# Patient Record
Sex: FEMALE | Race: WHITE | Employment: OTHER | URBAN - METROPOLITAN AREA
[De-identification: names, ages, dates, MRNs, and addresses within clinical notes are randomized per-mention and may not be internally consistent; named-entity substitution may affect disease eponyms.]

---

## 2017-01-04 ENCOUNTER — OFFICE VISIT (OUTPATIENT)
Dept: FAMILY MEDICINE | Facility: CLINIC | Age: 77
End: 2017-01-04
Payer: MEDICARE

## 2017-01-04 VITALS
HEART RATE: 76 BPM | BODY MASS INDEX: 27.97 KG/M2 | DIASTOLIC BLOOD PRESSURE: 70 MMHG | TEMPERATURE: 97.6 F | HEIGHT: 62 IN | SYSTOLIC BLOOD PRESSURE: 115 MMHG | WEIGHT: 152 LBS

## 2017-01-04 DIAGNOSIS — K52.9 GASTROENTERITIS: Primary | ICD-10-CM

## 2017-01-04 DIAGNOSIS — J01.90 ACUTE SINUSITIS WITH SYMPTOMS > 10 DAYS: ICD-10-CM

## 2017-01-04 PROCEDURE — 99213 OFFICE O/P EST LOW 20 MIN: CPT | Performed by: FAMILY MEDICINE

## 2017-01-04 RX ORDER — AMOXICILLIN 500 MG/1
500 CAPSULE ORAL 3 TIMES DAILY
Qty: 30 CAPSULE | Refills: 0 | Status: SHIPPED | OUTPATIENT
Start: 2017-01-04 | End: 2017-05-17

## 2017-01-04 ASSESSMENT — PAIN SCALES - GENERAL: PAINLEVEL: NO PAIN (0)

## 2017-01-04 NOTE — NURSING NOTE
"Chief Complaint   Patient presents with     Hospital F/U     Health Maintenance     Other     bpa       Initial /70 mmHg  Pulse 76  Temp(Src) 97.6  F (36.4  C) (Oral)  Ht 5' 2\" (1.575 m)  Wt 152 lb (68.947 kg)  BMI 27.79 kg/m2  Breastfeeding? No Estimated body mass index is 27.79 kg/(m^2) as calculated from the following:    Height as of this encounter: 5' 2\" (1.575 m).    Weight as of this encounter: 152 lb (68.947 kg).  BP completed using cuff size: regular taken on the left arm  Stephanie Mcfarland CMA      "

## 2017-01-04 NOTE — PROGRESS NOTES
SUBJECTIVE:                                                    Hemalatha Handy is a 76 year old female who presents to clinic today for the following health issues:       ED/UC Followup:    Facility:  [facility]  Date of visit: 12/20/2016  Reason for visit: diarrhea and vomiting  Current Status: stable            Problem list and histories reviewed & adjusted, as indicated.  Additional history: as documented             HPI      ROS  Reviewed the emergency room report in detail    Feel good now    Stomach is good    Main problem now is dry cough    Tried some mucinex     And claritin    Cough for about 1 1/2 weeks now    Some drainage down back of throat    No fever    Physical Exam   Constitutional: She is oriented to person, place, and time and well-developed, well-nourished, and in no distress. No distress.   HENT:   Head: Normocephalic and atraumatic.   Neck: Carotid bruit is not present.   Cardiovascular: Normal rate, regular rhythm, normal heart sounds and intact distal pulses.  Exam reveals no gallop and no friction rub.    No murmur heard.  Pulmonary/Chest: Effort normal and breath sounds normal.   Musculoskeletal: She exhibits no edema.   Neurological: She is alert and oriented to person, place, and time.   Skin: She is not diaphoretic.   Psychiatric: Mood and affect normal.       Throat minimally red.  No tendereness over sinuses but there is a fair amount of drainage down back of throat    ASSESSMENT / PLAN:  (K52.9) Gastroenteritis  (primary encounter diagnosis)  Comment: this has resolved  Plan: follow up prn     (J01.90) Acute sinusitis with symptoms > 10 days  Comment: cough likely due to drainage. Given duration of symptoms, reasonable to try antibiotics   Plan: amoxicillin (AMOXIL) 500 MG capsule        Follow up as needed based on symptoms       I reviewed the patient's medications, allergies, medical history, family history, and social history.    Javier Jones MD

## 2017-01-04 NOTE — PATIENT INSTRUCTIONS
Take the antibiotics     Stay well hydrated    Call if symptoms not resolving after antibiotics done    Advance diet as tolerated

## 2017-01-04 NOTE — MR AVS SNAPSHOT
"              After Visit Summary   1/4/2017    Hemalatha Handy    MRN: [id number]           Patient Information     Date Of Birth          1940        Visit Information        Provider Department      1/4/2017 8:20 AM Javier Jones MD [facility]        Today's Diagnoses     Gastroenteritis    -  1     Acute sinusitis with symptoms > 10 days           Care Instructions    Take the antibiotics     Stay well hydrated    Call if symptoms not resolving after antibiotics done    Advance diet as tolerated          Follow-ups after your visit        Who to contact     If you have questions or need follow up information about today's clinic visit or your schedule please contact [facility] directly at [phone].  Normal or non-critical lab and imaging results will be communicated to you by MyChart, letter or phone within 4 business days after the clinic has received the results. If you do not hear from us within 7 days, please contact the clinic through MyChart or phone. If you have a critical or abnormal lab result, we will notify you by phone as soon as possible.  Submit refill requests through [employer] or call your pharmacy and they will forward the refill request to us. Please allow 3 business days for your refill to be completed.          Additional Information About Your Visit        MyChart Information     [employer] lets you send messages to your doctor, view your test results, renew your prescriptions, schedule appointments and more. To sign up, go to [email or web address] . Click on \"Log in\" on the left side of the screen, which will take you to the Welcome page. Then click on \"Sign up Now\" on the right side of the page.     You will be asked to enter the access code listed below, as well as some personal information. Please follow the directions to create your username and password.     Your access code is: [email or web address]  Expires: 4/4/2017  8:54 AM     Your " "access code will  in 90 days. If you need help or a new code, please call your [facility] clinic or [phone].        Care EveryWhere ID     This is your Care EveryWhere ID. This could be used by other organizations to access your [facility] medical records  [id number]        Your Vitals Were     Pulse Temperature Height BMI (Body Mass Index) Breastfeeding?       76 97.6  F (36.4  C) (Oral) 5' 2\" (1.575 m) 27.79 kg/m2 No        Blood Pressure from Last 3 Encounters:   17 115/70   16 139/75   16 119/70    Weight from Last 3 Encounters:   17 152 lb (68.947 kg)   16 155 lb (70.308 kg)   16 157 lb (71.215 kg)              Today, you had the following     No orders found for display         Today's Medication Changes          These changes are accurate as of: 17  8:54 AM.  If you have any questions, ask your nurse or doctor.               Start taking these medicines.        Dose/Directions    amoxicillin 500 MG capsule   Commonly known as:  AMOXIL   Used for:  Acute sinusitis with symptoms > 10 days   Started by:  Javier Jones MD        Dose:  500 mg   Take 1 capsule (500 mg) by mouth 3 times daily   Quantity:  30 capsule   Refills:  0            Where to get your medicines      These medications were sent to [id number] - [facility] [address]  [address] [facility] [address]     Phone:  [phone]    - amoxicillin 500 MG capsule             Primary Care Provider Office Phone # Fax #    Javier Jones [phone]       [facility] [address]  [facility] [address]        Thank you!     Thank you for choosing [facility]  for your care. Our goal is always to provide you with excellent care. Hearing back from our patients is one way we can continue to improve our services. Please take a few minutes to complete the written survey that you may receive in the mail " after your visit with us. Thank you!             Your Updated Medication List - Protect others around you: Learn how to safely use, store and throw away your medicines at www.disposemymeds.org.          This list is accurate as of: 1/4/17  8:54 AM.  Always use your most recent med list.                   Brand Name Dispense Instructions for use    ALPRAZolam 0.25 MG tablet    XANAX    30 tablet    Take 1 tablet (0.25 mg) by mouth 3 times daily as needed for anxiety       amoxicillin 500 MG capsule    AMOXIL    30 capsule    Take 1 capsule (500 mg) by mouth 3 times daily       citalopram 20 MG tablet    celeXA    90 tablet    Take 1 tablet (20 mg) by mouth daily       lisinopril 10 MG tablet    PRINIVIL/ZESTRIL    90 tablet    Take 1 tablet (10 mg) by mouth daily       MOTRIN 800 MG tablet   Generic drug:  ibuprofen      1 TABLET 3 TIMES DAILY AS NEEDED       ranitidine 150 MG tablet    ZANTAC    180 tablet    Take 1 tablet (150 mg) by mouth 2 times daily

## 2017-01-16 ENCOUNTER — TELEPHONE (OUTPATIENT)
Dept: FAMILY MEDICINE | Facility: CLINIC | Age: 77
End: 2017-01-16

## 2017-01-16 DIAGNOSIS — F41.9 ANXIETY: Primary | ICD-10-CM

## 2017-01-16 RX ORDER — ALPRAZOLAM 0.25 MG
0.25 TABLET ORAL 3 TIMES DAILY PRN
Qty: 30 TABLET | Refills: 0 | Status: SHIPPED | OUTPATIENT
Start: 2017-01-16 | End: 2017-02-22

## 2017-01-16 NOTE — TELEPHONE ENCOUNTER
ALPRAZolam (XANAX) 0.25 MG tablet      Last Written Prescription Date:  12/7/16  Last Fill Quantity: 30,   # refills: 0  Last Office Visit with [facility], [facility] or [facility] prescribing provider: 1/4/17  Future Office visit:       Routing refill request to provider for review/approval because:  Drug not on the [facility], [facility] or [facility] refill protocol or controlled substance

## 2017-02-03 DIAGNOSIS — I10 HYPERTENSION GOAL BP (BLOOD PRESSURE) < 140/90: Primary | ICD-10-CM

## 2017-02-03 DIAGNOSIS — F41.9 ANXIETY: Primary | ICD-10-CM

## 2017-02-03 NOTE — TELEPHONE ENCOUNTER
lisinopril (PRINIVIL,ZESTRIL) 10 MG tablet      Last Written Prescription Date: 4-18-16  Last Fill Quantity: 90, # refills: 2  Last Office Visit with [facility], [facility] or [facility] prescribing provider: 1-4-17       POTASSIUM   Date Value Ref Range Status   01/21/2016 3.9 3.4 - 5.3 mmol/L Final     CREATININE   Date Value Ref Range Status   01/21/2016 0.65 0.52 - 1.04 mg/dL Final     BP Readings from Last 3 Encounters:   01/04/17 115/70   08/01/16 139/75   01/21/16 119/70

## 2017-02-03 NOTE — TELEPHONE ENCOUNTER
citalopram (CELEXA) 20 MG tablet     Last Written Prescription Date: 7/25/16  Last Fill Quantity: 90, # refills: 1  Last Office Visit with [facility] primary care provider:  1/4/17        Last PHQ-9 score on record= No flowsheet data found.

## 2017-02-06 RX ORDER — LISINOPRIL 10 MG/1
10 TABLET ORAL DAILY
Qty: 90 TABLET | Refills: 1 | Status: SHIPPED | OUTPATIENT
Start: 2017-02-06 | End: 2017-08-12

## 2017-02-06 RX ORDER — CITALOPRAM HYDROBROMIDE 20 MG/1
20 TABLET ORAL DAILY
Qty: 90 TABLET | Refills: 1 | Status: SHIPPED | OUTPATIENT
Start: 2017-02-06 | End: 2017-08-12

## 2017-02-06 NOTE — TELEPHONE ENCOUNTER
Routing refill request to provider for review/approval because:  Due to be seen for anxiety  Lynne Lopez RN [facility] Triage.

## 2017-02-06 NOTE — TELEPHONE ENCOUNTER
Routing refill request to provider for review/approval because:  Labs not current  Lynne Lopez RN [facility] Triage.

## 2017-02-22 DIAGNOSIS — F41.9 ANXIETY: ICD-10-CM

## 2017-02-22 NOTE — TELEPHONE ENCOUNTER
ALPRAZolam (XANAX) 0.25 MG tablet      Last Written Prescription Date:  1/16/17  Last Fill Quantity: 30,    refills:0  Last Office Visit with [facility], [facility] or [facility] prescribing provider: 1/4/17  Future Office visit:       Routing refill request to provider for review/approval because:  Drug not on the [facility], [facility] or [facility] refill protocol or controlled substance

## 2017-02-24 RX ORDER — ALPRAZOLAM 0.25 MG
0.25 TABLET ORAL 3 TIMES DAILY PRN
Qty: 30 TABLET | Refills: 0 | Status: SHIPPED | OUTPATIENT
Start: 2017-02-24 | End: 2017-04-02

## 2017-04-02 DIAGNOSIS — F41.9 ANXIETY: ICD-10-CM

## 2017-04-03 NOTE — TELEPHONE ENCOUNTER
ALPRAZolam (XANAX) 0.25 MG tablet      Last Written Prescription Date:  2-24-17  Last Fill Quantity: 30,   # refills: 0  Last Office Visit with [facility], [facility] or [facility] prescribing provider: 1-4-17  Future Office visit:       Routing refill request to provider for review/approval because:  Drug not on the [facility], [facility] or [facility] refill protocol or controlled substance

## 2017-04-04 RX ORDER — ALPRAZOLAM 0.25 MG
TABLET ORAL
Qty: 30 TABLET | Refills: 0 | Status: SHIPPED | OUTPATIENT
Start: 2017-04-04 | End: 2017-05-08

## 2017-04-15 ENCOUNTER — OFFICE VISIT (OUTPATIENT)
Dept: URGENT CARE | Facility: URGENT CARE | Age: 77
End: 2017-04-15
Payer: MEDICARE

## 2017-04-15 VITALS
WEIGHT: 154 LBS | BODY MASS INDEX: 28.17 KG/M2 | SYSTOLIC BLOOD PRESSURE: 128 MMHG | DIASTOLIC BLOOD PRESSURE: 76 MMHG | TEMPERATURE: 97.4 F | HEART RATE: 75 BPM

## 2017-04-15 DIAGNOSIS — R30.0 DYSURIA: ICD-10-CM

## 2017-04-15 DIAGNOSIS — M54.50 ACUTE BILATERAL LOW BACK PAIN WITHOUT SCIATICA: Primary | ICD-10-CM

## 2017-04-15 DIAGNOSIS — R31.29 MICROSCOPIC HEMATURIA: ICD-10-CM

## 2017-04-15 LAB
ALBUMIN UR-MCNC: NEGATIVE MG/DL
APPEARANCE UR: CLEAR
BILIRUB UR QL STRIP: NEGATIVE
COLOR UR AUTO: YELLOW
GLUCOSE UR STRIP-MCNC: NEGATIVE MG/DL
HGB UR QL STRIP: ABNORMAL
KETONES UR STRIP-MCNC: NEGATIVE MG/DL
LEUKOCYTE ESTERASE UR QL STRIP: NEGATIVE
NITRATE UR QL: NEGATIVE
NON-SQ EPI CELLS #/AREA URNS LPF: ABNORMAL /LPF
PH UR STRIP: 5.5 PH (ref 5–7)
RBC #/AREA URNS AUTO: ABNORMAL /HPF (ref 0–2)
SP GR UR STRIP: 1.02 (ref 1–1.03)
URN SPEC COLLECT METH UR: ABNORMAL
UROBILINOGEN UR STRIP-ACNC: 0.2 EU/DL (ref 0.2–1)
WBC #/AREA URNS AUTO: ABNORMAL /HPF (ref 0–2)

## 2017-04-15 PROCEDURE — 87086 URINE CULTURE/COLONY COUNT: CPT | Performed by: FAMILY MEDICINE

## 2017-04-15 PROCEDURE — 81001 URINALYSIS AUTO W/SCOPE: CPT | Performed by: FAMILY MEDICINE

## 2017-04-15 PROCEDURE — 99214 OFFICE O/P EST MOD 30 MIN: CPT | Performed by: FAMILY MEDICINE

## 2017-04-15 RX ORDER — NITROFURANTOIN 25; 75 MG/1; MG/1
100 CAPSULE ORAL 2 TIMES DAILY
Qty: 14 CAPSULE | Refills: 0 | Status: SHIPPED | OUTPATIENT
Start: 2017-04-15 | End: 2017-05-17

## 2017-04-15 NOTE — MR AVS SNAPSHOT
"              After Visit Summary   4/15/2017    Hemalatha Handy    MRN: [id number]           Patient Information     Date Of Birth          1940        Visit Information        Provider Department      4/15/2017 12:40 PM Nicol Ferguson MD [facility]        Today's Diagnoses     Acute bilateral low back pain without sciatica    -  1    Dysuria        Microscopic hematuria           Follow-ups after your visit        Future tests that were ordered for you today     Open Future Orders        Priority Expected Expires Ordered    UA with Microscopic reflex to Culture Routine 5/27/2017 4/15/2018 4/15/2017            Who to contact     If you have questions or need follow up information about today's clinic visit or your schedule please contact [facility] directly at [phone].  Normal or non-critical lab and imaging results will be communicated to you by MyChart, letter or phone within 4 business days after the clinic has received the results. If you do not hear from us within 7 days, please contact the clinic through MyChart or phone. If you have a critical or abnormal lab result, we will notify you by phone as soon as possible.  Submit refill requests through [employer] or call your pharmacy and they will forward the refill request to us. Please allow 3 business days for your refill to be completed.          Additional Information About Your Visit        [employer] To File Information     [employer] lets you send messages to your doctor, view your test results, renew your prescriptions, schedule appointments and more. To sign up, go to [email or web address] . Click on \"Log in\" on the left side of the screen, which will take you to the Welcome page. Then click on \"Sign up Now\" on the right side of the page.     You will be asked to enter the access code listed below, as well as some personal information. Please follow the directions to create your username and password.     Your access " code is: [email or web address]  Expires: 2017 10:21 PM     Your access code will  in 90 days. If you need help or a new code, please call your [facility] clinic or [phone].        Care EveryWhere ID     This is your Care EveryWhere ID. This could be used by other organizations to access your [facility] medical records  [id number]        Your Vitals Were     Pulse Temperature BMI (Body Mass Index)             75 97.4  F (36.3  C) (Tympanic) 28.17 kg/m2          Blood Pressure from Last 3 Encounters:   04/15/17 128/76   17 115/70   16 139/75    Weight from Last 3 Encounters:   04/15/17 154 lb (69.9 kg)   17 152 lb (68.9 kg)   16 155 lb (70.3 kg)              We Performed the Following     *UA reflex to Microscopic and Culture ([place] [facility] (except [place] and [place])     Urine Culture Aerobic Bacterial     Urine Microscopic          Today's Medication Changes          These changes are accurate as of: 4/15/17 10:22 PM.  If you have any questions, ask your nurse or doctor.               Start taking these medicines.        Dose/Directions    nitrofurantoin (macrocrystal-monohydrate) 100 MG capsule   Commonly known as:  MACROBID   Used for:  Microscopic hematuria, Dysuria        Dose:  100 mg   Take 1 capsule (100 mg) by mouth 2 times daily   Quantity:  14 capsule   Refills:  0            Where to get your medicines      These medications were sent to [id number] [address]  [address] [facility] [address]     Phone:  [phone]     nitrofurantoin (macrocrystal-monohydrate) 100 MG capsule                Primary Care Provider Office Phone # Fax #    Javier Jones [phone]       [address] [address]        Thank you!     [facility] you [facility]  [facility]. Our goal is always to provide you with excellent care. Hearing back  from our patients is one way we can continue to improve our services. Please take a few minutes to complete the written survey that you may receive in the mail after your visit with us. Thank you!             Your Updated Medication List - Protect others around you: Learn how to safely use, store and throw away your medicines at www.disposemymeds.org.          This list is accurate as of: 4/15/17 10:22 PM.  Always use your most recent med list.                   Brand Name Dispense Instructions for use    ALPRAZolam 0.25 MG tablet    XANAX    30 tablet    TAKE ONE TABLET BY MOUTH THREE TIMES DAILY AS NEEDED FOR ANXIETY       amoxicillin 500 MG capsule    AMOXIL    30 capsule    Take 1 capsule (500 mg) by mouth 3 times daily       citalopram 20 MG tablet    celeXA    90 tablet    Take 1 tablet (20 mg) by mouth daily       lisinopril 10 MG tablet    PRINIVIL/ZESTRIL    90 tablet    Take 1 tablet (10 mg) by mouth daily       MOTRIN 800 MG tablet   Generic drug:  ibuprofen      1 TABLET 3 TIMES DAILY AS NEEDED       nitrofurantoin (macrocrystal-monohydrate) 100 MG capsule    MACROBID    14 capsule    Take 1 capsule (100 mg) by mouth 2 times daily       ranitidine 150 MG tablet    ZANTAC    180 tablet    Take 1 tablet (150 mg) by mouth 2 times daily

## 2017-04-15 NOTE — LETTER
April 25, 2018      Hemalatha Handy  [address]  [place] [address]        Dear Hemalatha,    At your last office visit with me - your urine showed some trace amount of blood.  I would recommend repeat urinalysis to make sure that this is resolved and not persistent.   Please call our clinic to schedule a lab appointment [phone]        Sincerely,    Nicol Ferguson

## 2017-04-15 NOTE — PROGRESS NOTES
SUBJECTIVE:                                                    Hemalatha Handy is a 76 year old female who presents to clinic today for the following health issues:      URINARY TRACT SYMPTOMS      Duration: X 3 days    Description  frequency, urgency and back pain, burning    Intensity:  moderate    Accompanying signs and symptoms:  Fever/chills: no   Flank pain YES - both sides lumbar area  Worse with walking. Rubbing seems to make it better  Unsure if possible muscle strain.   No loss of control of bowel or bladder, no numbness or weakness down the legs  Nausea and vomiting: YES- nausea  Vaginal symptoms: none  Abdominal/Pelvic Pain: no     History  History of frequent UTI's: YES- X 1  History of kidney stones: no   Sexually Active: no   Possibility of pregnancy: No    Precipitating or alleviating factors: None    Therapies tried and outcome: increased fluids     Had a history of uti a couple of years ago.     Past 3 days just noticed a back pain  Occasional burning with urination  No gross hematuria      Problem list and histories reviewed & adjusted, as indicated.  Additional history: as documented    Problem list, Medication list, Allergies, and Medical/Social/Surgical histories reviewed in EPIC and updated as appropriate.    ROS:  Constitutional, HEENT, cardiovascular, pulmonary, gi and gu systems are negative, except as otherwise noted.    OBJECTIVE:                                                    /76  Pulse 75  Temp 97.4  F (36.3  C) (Tympanic)  Wt 154 lb (69.9 kg)  BMI 28.17 kg/m2  Body mass index is 28.17 kg/(m^2).  GENERAL: healthy, alert and no distress  NECK: no adenopathy, no asymmetry, masses, or scars and thyroid normal to palpation  RESP: lungs clear to auscultation - no rales, rhonchi or wheezes  CV: regular rate and rhythm, normal S1 S2, no S3 or S4, no murmur, click or rub, no peripheral edema and peripheral pulses strong  ABDOMEN: soft, nontender, no hepatosplenomegaly, no  masses and bowel sounds normal  MS: no gross musculoskeletal defects noted, no edema  No spine tenderness positive bilateral lumbar mild muscle spasm, No abnormal straight leg raise testing MMT5/5, sensory intact, normal reflexes    Diagnostic Test Results:  Results for orders placed or performed in visit on 04/15/17 (from the past 24 hour(s))   *UA reflex to Microscopic and Culture ([facility] (except [place] and [place])   Result Value Ref Range    Color Urine Yellow     Appearance Urine Clear     Glucose Urine Negative NEG mg/dL    Bilirubin Urine Negative NEG    Ketones Urine Negative NEG mg/dL    Specific Gravity Urine 1.025 1.003 - 1.035    Blood Urine Small (A) NEG    pH Urine 5.5 5.0 - 7.0 pH    Protein Albumin Urine Negative NEG mg/dL    Urobilinogen Urine 0.2 0.2 - 1.0 EU/dL    Nitrite Urine Negative NEG    Leukocyte Esterase Urine Negative NEG    Source Midstream Urine    Urine Microscopic   Result Value Ref Range    WBC Urine O - 2 0 - 2 /HPF    RBC Urine 2-5 (A) 0 - 2 /HPF    Squamous Epithelial /LPF Urine Few FEW /LPF        ASSESSMENT/PLAN:                                                        ICD-10-CM    1. Acute bilateral low back pain without sciatica M54.5 *UA reflex to Microscopic and Culture ([facility] (except [place] and [place])     Urine Microscopic   2. Dysuria R30.0 Urine Culture Aerobic Bacterial     nitrofurantoin, macrocrystal-monohydrate, (MACROBID) 100 MG capsule   3. Microscopic hematuria R31.29 Urine Culture Aerobic Bacterial     nitrofurantoin, macrocrystal-monohydrate, (MACROBID) 100 MG capsule       Aware to come back in if with any worsening symptoms, bowel or bladder incontinence, motor or senosry deficits.   Some microscopic hematuria and dysuria. Per patient this is how she gets with a uti  Send for urine culture  Prescribed with macrobid  If culture negative refer urology  If positive, hematuria likely from infection  Patient with  hematuria advised a repeat urinalysis in 6 weeks is needed to make sure hematuria is resolved. If persistent aware will need further evaluation to rule out possibility of stones or tumors. Patient will make lab appointment  Aware to go to ER or come in immediately if with any fever chills nausea vomiting or unilateral or worsening flank pain.  Adverse reactions of medications discussed.  Over the counter medications discussed.   Aware to come back in if with worsening symptoms or if no relief despite treatment plan  Patient voiced understanding and had no further questions.     MD Nicol Mott MD  [facility]

## 2017-04-15 NOTE — NURSING NOTE
"Chief Complaint   Patient presents with     Back Pain     burning urination, frequency       Initial /76  Pulse 75  Temp 97.4  F (36.3  C) (Tympanic)  Wt 154 lb (69.9 kg)  BMI 28.17 kg/m2 Estimated body mass index is 28.17 kg/(m^2) as calculated from the following:    Height as of 1/4/17: 5' 2\" (1.575 m).    Weight as of this encounter: 154 lb (69.9 kg).  Medication Reconciliation: complete   Nani Ye CMA      "

## 2017-04-16 LAB
BACTERIA SPEC CULT: NORMAL
MICRO REPORT STATUS: NORMAL
SPECIMEN SOURCE: NORMAL

## 2017-05-08 ENCOUNTER — TELEPHONE (OUTPATIENT)
Dept: PEDIATRICS | Facility: CLINIC | Age: 77
End: 2017-05-08

## 2017-05-08 DIAGNOSIS — F41.9 ANXIETY: ICD-10-CM

## 2017-05-08 RX ORDER — ALPRAZOLAM 0.25 MG
TABLET ORAL
Qty: 30 TABLET | Refills: 0 | Status: SHIPPED | OUTPATIENT
Start: 2017-05-08 | End: 2017-06-15

## 2017-05-08 NOTE — TELEPHONE ENCOUNTER
ALPRAZolam (XANAX) 0.25 MG tablet      Last Written Prescription Date:  4-4-17  Last Fill Quantity: 30,   # refills: 0  Last Office Visit with [facility], [facility] or [facility] prescribing provider: 1-4-17  Future Office visit:       Routing refill request to provider for review/approval because:  Drug not on the [facility], [facility] or [facility] refill protocol or controlled substance

## 2017-05-17 ENCOUNTER — OFFICE VISIT (OUTPATIENT)
Dept: PEDIATRICS | Facility: CLINIC | Age: 77
End: 2017-05-17
Payer: MEDICARE

## 2017-05-17 VITALS
TEMPERATURE: 97.8 F | BODY MASS INDEX: 27.44 KG/M2 | OXYGEN SATURATION: 97 % | DIASTOLIC BLOOD PRESSURE: 67 MMHG | HEART RATE: 88 BPM | WEIGHT: 150 LBS | SYSTOLIC BLOOD PRESSURE: 122 MMHG

## 2017-05-17 DIAGNOSIS — S83.512S RUPTURE OF ANTERIOR CRUCIATE LIGAMENT OF LEFT KNEE, SEQUELA: ICD-10-CM

## 2017-05-17 DIAGNOSIS — M70.42 PREPATELLAR BURSITIS, LEFT KNEE: Primary | ICD-10-CM

## 2017-05-17 PROCEDURE — 99214 OFFICE O/P EST MOD 30 MIN: CPT | Performed by: INTERNAL MEDICINE

## 2017-05-17 RX ORDER — METHYLPREDNISOLONE 4 MG
TABLET, DOSE PACK ORAL
Qty: 21 TABLET | Refills: 0 | Status: SHIPPED | OUTPATIENT
Start: 2017-05-17 | End: 2017-08-23

## 2017-05-17 ASSESSMENT — PAIN SCALES - GENERAL: PAINLEVEL: MILD PAIN (3)

## 2017-05-17 NOTE — PROGRESS NOTES
SUBJECTIVE:                                                    Hemalatha Handy is a 77 year old female who presents to clinic today for the following health issues:      Joint Pain     Onset: 5 days    Description:   Location: left knee  Character: Sharp, Dull ache and Stabbing    Intensity: moderate    Progression of Symptoms: same, constant    Accompanying Signs & Symptoms:  Other symptoms: radiation of pain to upper leg, swelling and stiffness    History:   Previous similar pain: YES- old injury to knee      Precipitating factors:   Trauma or overuse: YES- old injury to knee    Alleviating factors:  Improved by: nothing       Therapies Tried and outcome: Resting          Problem list and histories reviewed & adjusted, as indicated.  Additional history: as documented    Patient Active Problem List   Diagnosis     CARDIOVASCULAR SCREENING; LDL GOAL LESS THAN 130     Anxiety     Advanced directives, counseling/discussion     GERD (gastroesophageal reflux disease)     Hyperlipidemia with target LDL less than 130     Lateral meniscus tear, left, initial encounter     Popliteal cyst, left     Gastroesophageal reflux disease, esophagitis presence not specified     Essential hypertension with goal blood pressure less than 140/90     Past Surgical History:   Procedure Laterality Date     COLONOSCOPY  9-18-08     ENT SURGERY  7-12-12    Left lower lip lesion     HYSTERECTOMY, PAP NO LONGER INDICATED         Social History   Substance Use Topics     Smoking status: Former Smoker     Quit date: 1/1/2005     Smokeless tobacco: Never Used     Alcohol use Yes     Family History   Problem Relation Age of Onset     Hypertension Brother      Hypertension Mother      CANCER Son      hodgkins     C.A.D. No family hx of      CEREBROVASCULAR DISEASE No family hx of          Current Outpatient Prescriptions   Medication Sig Dispense Refill     methylPREDNISolone (MEDROL DOSEPAK) 4 MG tablet Follow package instructions 21 tablet 0      ALPRAZolam (XANAX) 0.25 MG tablet TAKE ONE TABLET BY MOUTH THREE TIMES DAILY AS NEEDED FOR ANXIETY 30 tablet 0     lisinopril (PRINIVIL/ZESTRIL) 10 MG tablet Take 1 tablet (10 mg) by mouth daily 90 tablet 1     citalopram (CELEXA) 20 MG tablet Take 1 tablet (20 mg) by mouth daily 90 tablet 1     ranitidine (ZANTAC) 150 MG tablet Take 1 tablet (150 mg) by mouth 2 times daily 180 tablet 3     MOTRIN 800 MG OR TABS 1 TABLET 3 TIMES DAILY AS NEEDED       Allergies   Allergen Reactions     Alcohol Hives     Asa [Aspirin]      Evista [Raloxifene Hydrochloride]      Prempro      Raloxifene Hives and Rash     Recent Labs   Lab Test  01/21/16   1012  06/05/15   1017  03/23/15   1456  05/14/14   1522   03/02/12   1101   A1C  5.7   --    --   5.9   --   5.3   LDL  124*  130*   --   107   < >  106   HDL  53  56   --   47*   < >  42*   TRIG  121  144   --   84   < >  126   ALT  21   --   31  26   < >  23   CR  0.65   --   0.69  0.71   < >  0.66   GFRESTIMATED  89   --   83  80   < >  88   GFRESTBLACK  >90   GFR Calc     --   >90   GFR Calc    >90   < >  >90   POTASSIUM  3.9   --   4.0  4.3   < >  4.5   TSH  1.53   --   0.94  0.43   < >  1.02    < > = values in this interval not displayed.        Reviewed and updated as needed this visit by clinical staff  Tobacco  Allergies  Meds  Med Hx  Surg Hx  Fam Hx  Soc Hx      Reviewed and updated as needed this visit by Provider         ROS:  Constitutional, HEENT, cardiovascular, pulmonary, gi and gu systems are negative, except as otherwise noted.    OBJECTIVE:                                                    /67 (BP Location: Left arm, Patient Position: Chair, Cuff Size: Adult Regular)  Pulse 88  Temp 97.8  F (36.6  C) (Oral)  Wt 150 lb (68 kg)  SpO2 97%  BMI 27.44 kg/m2  Body mass index is 27.44 kg/(m^2).  GENERAL: healthy, alert and no distress  NECK: no adenopathy, no asymmetry, masses, or scars and thyroid normal to  palpation  RESP: lungs clear to auscultation - no rales, rhonchi or wheezes  CV: regular rate and rhythm, normal S1 S2, no S3 or S4, no murmur, click or rub, no peripheral edema and peripheral pulses strong  ABDOMEN: soft, nontender, no hepatosplenomegaly, no masses and bowel sounds normal  MS: no gross musculoskeletal defects noted, no edema  Loose and clunky on anterior drawer sign  Mc genao's negative bilaterally      Diagnostic Test Results:  Results for orders placed or performed in visit on 04/15/17   *UA reflex to Microscopic and Culture ([place] [facility] (except [place] and [place])   Result Value Ref Range    Color Urine Yellow     Appearance Urine Clear     Glucose Urine Negative NEG mg/dL    Bilirubin Urine Negative NEG    Ketones Urine Negative NEG mg/dL    Specific Gravity Urine 1.025 1.003 - 1.035    Blood Urine Small (A) NEG    pH Urine 5.5 5.0 - 7.0 pH    Protein Albumin Urine Negative NEG mg/dL    Urobilinogen Urine 0.2 0.2 - 1.0 EU/dL    Nitrite Urine Negative NEG    Leukocyte Esterase Urine Negative NEG    Source Midstream Urine    Urine Microscopic   Result Value Ref Range    WBC Urine O - 2 0 - 2 /HPF    RBC Urine 2-5 (A) 0 - 2 /HPF    Squamous Epithelial /LPF Urine Few FEW /LPF   Urine Culture Aerobic Bacterial   Result Value Ref Range    Specimen Description Midstream Urine     Culture Micro       <10,000 colonies/mL mixed urogenital patti Susceptibility testing not routinely   done      Micro Report Status FINAL 04/16/2017         ASSESSMENT/PLAN:                                                        ICD-10-CM    1. Prepatellar bursitis, left knee M70.42 methylPREDNISolone (MEDROL DOSEPAK) 4 MG tablet   2. Rupture of anterior cruciate ligament of left knee, sequela S83.512S methylPREDNISolone (MEDROL DOSEPAK) 4 MG tablet     MRI reviewed with substantial chronic change including likely complete ACL tear oand lateral complex meniscus    If not improved, will need  ortho.        Brian Henriquez MD  [facility]

## 2017-05-17 NOTE — MR AVS SNAPSHOT
"              After Visit Summary   2017    Hemalatha Handy    MRN: [id number]           Patient Information     Date Of Birth          1940        Visit Information        Provider Department      2017 1:40 PM Brian Henriquez MD [facility]        Today's Diagnoses     Prepatellar bursitis, left knee    -  1    Rupture of anterior cruciate ligament of left knee, sequela           Follow-ups after your visit        Who to contact     If you have questions or need follow up information about today's clinic visit or your schedule please contact [facility] directly at [phone].  Normal or non-critical lab and imaging results will be communicated to you by MyChart, letter or phone within 4 business days after the clinic has received the results. If you do not hear from us within 7 days, please contact the clinic through BuzzSumohart or phone. If you have a critical or abnormal lab result, we will notify you by phone as soon as possible.  Submit refill requests through [email or web address] or call your pharmacy and they will forward the refill request to us. Please allow 3 business days for your refill to be completed.          Additional Information About Your Visit        MyChart Information     [email or web address] lets you send messages to your doctor, view your test results, renew your prescriptions, schedule appointments and more. To sign up, go to [email or web address] . Click on \"Log in\" on the left side of the screen, which will take you to the Welcome page. Then click on \"Sign up Now\" on the right side of the page.     You will be asked to enter the access code listed below, as well as some personal information. Please follow the directions to create your username and password.     Your access code is: [email or web address]  Expires: 2017 10:21 PM     Your access code will  in 90 days. If you need help or a new code, please call your [facility] or [phone].      "   Care EveryWhere ID     This is your Care EveryWhere ID. This could be used by other organizations to access your [place] medical records  [id number]        Your Vitals Were     Pulse Temperature Pulse Oximetry BMI (Body Mass Index)          88 97.8  F (36.6  C) (Oral) 97% 27.44 kg/m2         Blood Pressure from Last 3 Encounters:   05/17/17 122/67   04/15/17 128/76   01/04/17 115/70    Weight from Last 3 Encounters:   05/17/17 150 lb (68 kg)   04/15/17 154 lb (69.9 kg)   01/04/17 152 lb (68.9 kg)              Today, you had the following     No orders found for display         Today's Medication Changes          These changes are accurate as of: 5/17/17  1:53 PM.  If you have any questions, ask your nurse or doctor.               Start taking these medicines.        Dose/Directions    methylPREDNISolone 4 MG tablet   Commonly known as:  MEDROL DOSEPAK   Used for:  Prepatellar bursitis, left knee, Rupture of anterior cruciate ligament of left knee, sequela   Started by:  Brian Henriquez MD        Follow package instructions   Quantity:  21 tablet   Refills:  0            Where to get your medicines      These medications were sent to [id number] - [facility] [address]  [address] [facility] [facility] [address]     Phone:  [phone]     methylPREDNISolone 4 MG tablet                Primary Care Provider Office Phone # Fax #    Javier Jones [phone]       [facility] [place] [address] [facility] [address]        Thank you!     Thank you for choosing [facility]  for your care. Our goal is always to provide you with excellent care. Hearing back from our patients is one way we can continue to improve our services. Please take a few minutes to complete the written survey that you may receive in the mail after your visit with us. Thank you!             Your Updated Medication List - Protect others around you: Learn how  to safely use, store and throw away your medicines at www.disposemymeds.org.          This list is accurate as of: 5/17/17  1:53 PM.  Always use your most recent med list.                   Brand Name Dispense Instructions for use    ALPRAZolam 0.25 MG tablet    XANAX    30 tablet    TAKE ONE TABLET BY MOUTH THREE TIMES DAILY AS NEEDED FOR ANXIETY       citalopram 20 MG tablet    celeXA    90 tablet    Take 1 tablet (20 mg) by mouth daily       lisinopril 10 MG tablet    PRINIVIL/ZESTRIL    90 tablet    Take 1 tablet (10 mg) by mouth daily       methylPREDNISolone 4 MG tablet    MEDROL DOSEPAK    21 tablet    Follow package instructions       MOTRIN 800 MG tablet   Generic drug:  ibuprofen      1 TABLET 3 TIMES DAILY AS NEEDED       ranitidine 150 MG tablet    ZANTAC    180 tablet    Take 1 tablet (150 mg) by mouth 2 times daily

## 2017-05-17 NOTE — NURSING NOTE
"Chief Complaint   Patient presents with     Knee Pain       Initial /67 (BP Location: Left arm, Patient Position: Chair, Cuff Size: Adult Regular)  Pulse 88  Temp 97.8  F (36.6  C) (Oral)  Wt 150 lb (68 kg)  SpO2 97%  BMI 27.44 kg/m2 Estimated body mass index is 27.44 kg/(m^2) as calculated from the following:    Height as of 1/4/17: 5' 2\" (1.575 m).    Weight as of this encounter: 150 lb (68 kg).  Medication Reconciliation: complete   Coleen Amanda MA      "

## 2017-06-15 DIAGNOSIS — F41.9 ANXIETY: ICD-10-CM

## 2017-06-16 RX ORDER — ALPRAZOLAM 0.25 MG
TABLET ORAL
Qty: 30 TABLET | Refills: 0 | Status: SHIPPED | OUTPATIENT
Start: 2017-06-16 | End: 2017-07-31

## 2017-06-16 NOTE — TELEPHONE ENCOUNTER
ALPRAZolam (XANAX) 0.25 MG tablet      Last Written Prescription Date:  5/8/17  Last Fill Quantity: 30,   # refills: 0  Last Office Visit with [facility], [facility] or [facility] prescribing provider: 5/17/17  Future Office visit:       Routing refill request to provider for review/approval because:  Drug not on the [facility], [facility] or [facility] refill protocol or controlled substance

## 2017-07-31 DIAGNOSIS — F41.9 ANXIETY: ICD-10-CM

## 2017-07-31 RX ORDER — ALPRAZOLAM 0.25 MG
TABLET ORAL
Qty: 30 TABLET | Refills: 0 | Status: SHIPPED | OUTPATIENT
Start: 2017-07-31 | End: 2017-08-23

## 2017-07-31 NOTE — TELEPHONE ENCOUNTER
Routing refill request to provider for review/approval because:  Drug not on the [facility] refill protocol     Mellissa Abad RN  [facility]

## 2017-07-31 NOTE — TELEPHONE ENCOUNTER
ALPRAZolam (XANAX) 0.25 MG tablet      Last Written Prescription Date:  6/16/17  Last Fill Quantity: 30,   # refills: 0  Last Office Visit with [facility], [facility] or [facility] prescribing provider: 5/17/17  Future Office visit:       Routing refill request to provider for review/approval because:  Drug not on the [facility], [facility] or [facility] refill protocol or controlled substance

## 2017-08-12 ENCOUNTER — TELEPHONE (OUTPATIENT)
Dept: FAMILY MEDICINE | Facility: CLINIC | Age: 77
End: 2017-08-12

## 2017-08-12 DIAGNOSIS — K21.9 GASTROESOPHAGEAL REFLUX DISEASE WITHOUT ESOPHAGITIS: ICD-10-CM

## 2017-08-12 DIAGNOSIS — F41.9 ANXIETY: ICD-10-CM

## 2017-08-12 DIAGNOSIS — I10 HYPERTENSION GOAL BP (BLOOD PRESSURE) < 140/90: ICD-10-CM

## 2017-08-14 RX ORDER — LISINOPRIL 10 MG/1
TABLET ORAL
Qty: 30 TABLET | Refills: 1 | Status: SHIPPED | OUTPATIENT
Start: 2017-08-14 | End: 2017-08-23

## 2017-08-14 RX ORDER — CITALOPRAM HYDROBROMIDE 20 MG/1
TABLET ORAL
Qty: 30 TABLET | Refills: 8 | Status: SHIPPED | OUTPATIENT
Start: 2017-08-14 | End: 2017-08-23

## 2017-08-14 NOTE — TELEPHONE ENCOUNTER
Okayed a month with a refill but advise clinic appointment in the next month or so.      Please inform patient    Javier Jones MD

## 2017-08-14 NOTE — TELEPHONE ENCOUNTER
Citalopram used for anxiety, PHQ9 not required.    Citalopram and zantac Prescription approved per [facility] Refill Protocol.    Lisinopril Routing refill request to provider for review/approval because:  Labs not current:  Potassium and creatinine, unsure of plan (office visit or lab only)    Mellissa Abad RN  [facility]

## 2017-08-14 NOTE — TELEPHONE ENCOUNTER
citalopram (CELEXA) 20 MG tablet     Last Written Prescription Date: 2/6/17  Last Fill Quantity: 90, # refills: 1  Last Office Visit with [facility] primary care provider:  5/17/17        Last PHQ-9 score on record= No flowsheet data found.      ranitidine (ZANTAC) 150 MG tablet      Last Written Prescription Date: 6/27/16  Last Fill Quantity: 180,  # refills: 3   Last Office Visit with [facility], [facility] or [facility] prescribing provider: 5/17/17                                                 lisinopril (PRINIVIL/ZESTRIL) 10 MG tablet      Last Written Prescription Date: 6/27/16  Last Fill Quantity: 180, # refills: 3  Last Office Visit with [facility], [facility] or [facility] prescribing provider: 5/17/17       Potassium   Date Value Ref Range Status   01/21/2016 3.9 3.4 - 5.3 mmol/L Final     Creatinine   Date Value Ref Range Status   01/21/2016 0.65 0.52 - 1.04 mg/dL Final     BP Readings from Last 3 Encounters:   05/17/17 122/67   04/15/17 128/76   01/04/17 115/70

## 2017-08-23 ENCOUNTER — OFFICE VISIT (OUTPATIENT)
Dept: FAMILY MEDICINE | Facility: CLINIC | Age: 77
End: 2017-08-23
Payer: COMMERCIAL

## 2017-08-23 VITALS
WEIGHT: 156.38 LBS | OXYGEN SATURATION: 96 % | DIASTOLIC BLOOD PRESSURE: 71 MMHG | HEART RATE: 80 BPM | BODY MASS INDEX: 28.6 KG/M2 | TEMPERATURE: 97 F | SYSTOLIC BLOOD PRESSURE: 135 MMHG

## 2017-08-23 DIAGNOSIS — Z13.1 SCREENING FOR DIABETES MELLITUS: ICD-10-CM

## 2017-08-23 DIAGNOSIS — K21.9 GASTROESOPHAGEAL REFLUX DISEASE WITHOUT ESOPHAGITIS: ICD-10-CM

## 2017-08-23 DIAGNOSIS — F41.9 ANXIETY: Primary | ICD-10-CM

## 2017-08-23 DIAGNOSIS — E78.5 HYPERLIPIDEMIA LDL GOAL <100: ICD-10-CM

## 2017-08-23 DIAGNOSIS — R53.83 FATIGUE, UNSPECIFIED TYPE: ICD-10-CM

## 2017-08-23 DIAGNOSIS — I10 HYPERTENSION GOAL BP (BLOOD PRESSURE) < 140/90: ICD-10-CM

## 2017-08-23 LAB
ALBUMIN SERPL-MCNC: 3.5 G/DL (ref 3.4–5)
ALP SERPL-CCNC: 83 U/L (ref 40–150)
ALT SERPL W P-5'-P-CCNC: 25 U/L (ref 0–50)
ANION GAP SERPL CALCULATED.3IONS-SCNC: 5 MMOL/L (ref 3–14)
AST SERPL W P-5'-P-CCNC: 22 U/L (ref 0–45)
BASOPHILS # BLD AUTO: 0 10E9/L (ref 0–0.2)
BASOPHILS NFR BLD AUTO: 0.5 %
BILIRUB SERPL-MCNC: 0.5 MG/DL (ref 0.2–1.3)
BUN SERPL-MCNC: 15 MG/DL (ref 7–30)
CALCIUM SERPL-MCNC: 8.6 MG/DL (ref 8.5–10.1)
CHLORIDE SERPL-SCNC: 107 MMOL/L (ref 94–109)
CHOLEST SERPL-MCNC: 175 MG/DL
CO2 SERPL-SCNC: 30 MMOL/L (ref 20–32)
CREAT SERPL-MCNC: 0.6 MG/DL (ref 0.52–1.04)
DIFFERENTIAL METHOD BLD: NORMAL
EOSINOPHIL # BLD AUTO: 0.2 10E9/L (ref 0–0.7)
EOSINOPHIL NFR BLD AUTO: 5.5 %
ERYTHROCYTE [DISTWIDTH] IN BLOOD BY AUTOMATED COUNT: 12.9 % (ref 10–15)
GFR SERPL CREATININE-BSD FRML MDRD: >90 ML/MIN/1.7M2
GLUCOSE SERPL-MCNC: 106 MG/DL (ref 70–99)
HCT VFR BLD AUTO: 40.3 % (ref 35–47)
HDLC SERPL-MCNC: 51 MG/DL
HGB BLD-MCNC: 13.5 G/DL (ref 11.7–15.7)
LDLC SERPL CALC-MCNC: 101 MG/DL
LYMPHOCYTES # BLD AUTO: 0.9 10E9/L (ref 0.8–5.3)
LYMPHOCYTES NFR BLD AUTO: 22.3 %
MCH RBC QN AUTO: 32.6 PG (ref 26.5–33)
MCHC RBC AUTO-ENTMCNC: 33.5 G/DL (ref 31.5–36.5)
MCV RBC AUTO: 97 FL (ref 78–100)
MONOCYTES # BLD AUTO: 0.5 10E9/L (ref 0–1.3)
MONOCYTES NFR BLD AUTO: 12.8 %
NEUTROPHILS # BLD AUTO: 2.4 10E9/L (ref 1.6–8.3)
NEUTROPHILS NFR BLD AUTO: 58.9 %
NONHDLC SERPL-MCNC: 124 MG/DL
PLATELET # BLD AUTO: 177 10E9/L (ref 150–450)
POTASSIUM SERPL-SCNC: 4.4 MMOL/L (ref 3.4–5.3)
PROT SERPL-MCNC: 6.6 G/DL (ref 6.8–8.8)
RBC # BLD AUTO: 4.14 10E12/L (ref 3.8–5.2)
SODIUM SERPL-SCNC: 142 MMOL/L (ref 133–144)
TRIGL SERPL-MCNC: 113 MG/DL
TSH SERPL DL<=0.005 MIU/L-ACNC: 0.86 MU/L (ref 0.4–4)
WBC # BLD AUTO: 4 10E9/L (ref 4–11)

## 2017-08-23 PROCEDURE — 99214 OFFICE O/P EST MOD 30 MIN: CPT | Performed by: FAMILY MEDICINE

## 2017-08-23 PROCEDURE — 36415 COLL VENOUS BLD VENIPUNCTURE: CPT | Performed by: FAMILY MEDICINE

## 2017-08-23 PROCEDURE — 80050 GENERAL HEALTH PANEL: CPT | Performed by: FAMILY MEDICINE

## 2017-08-23 PROCEDURE — 80061 LIPID PANEL: CPT | Performed by: FAMILY MEDICINE

## 2017-08-23 RX ORDER — LISINOPRIL 10 MG/1
10 TABLET ORAL DAILY
Qty: 90 TABLET | Refills: 3 | Status: SHIPPED | OUTPATIENT
Start: 2017-08-23 | End: 2018-04-25

## 2017-08-23 RX ORDER — ALPRAZOLAM 0.25 MG
TABLET ORAL
Qty: 30 TABLET | Refills: 0 | Status: SHIPPED | OUTPATIENT
Start: 2017-08-23 | End: 2017-10-23

## 2017-08-23 RX ORDER — CITALOPRAM HYDROBROMIDE 20 MG/1
20 TABLET ORAL DAILY
Qty: 90 TABLET | Refills: 3 | Status: SHIPPED | OUTPATIENT
Start: 2017-08-23 | End: 2018-04-25

## 2017-08-23 ASSESSMENT — ANXIETY QUESTIONNAIRES
3. WORRYING TOO MUCH ABOUT DIFFERENT THINGS: MORE THAN HALF THE DAYS
5. BEING SO RESTLESS THAT IT IS HARD TO SIT STILL: NOT AT ALL
7. FEELING AFRAID AS IF SOMETHING AWFUL MIGHT HAPPEN: NOT AT ALL
6. BECOMING EASILY ANNOYED OR IRRITABLE: NOT AT ALL
IF YOU CHECKED OFF ANY PROBLEMS ON THIS QUESTIONNAIRE, HOW DIFFICULT HAVE THESE PROBLEMS MADE IT FOR YOU TO DO YOUR WORK, TAKE CARE OF THINGS AT HOME, OR GET ALONG WITH OTHER PEOPLE: NOT DIFFICULT AT ALL
1. FEELING NERVOUS, ANXIOUS, OR ON EDGE: MORE THAN HALF THE DAYS
GAD7 TOTAL SCORE: 8
2. NOT BEING ABLE TO STOP OR CONTROL WORRYING: MORE THAN HALF THE DAYS

## 2017-08-23 ASSESSMENT — PATIENT HEALTH QUESTIONNAIRE - PHQ9: 5. POOR APPETITE OR OVEREATING: MORE THAN HALF THE DAYS

## 2017-08-23 ASSESSMENT — PAIN SCALES - GENERAL: PAINLEVEL: NO PAIN (0)

## 2017-08-23 NOTE — NURSING NOTE
"Chief Complaint   Patient presents with     Hypertension     Anxiety     Health Maintenance       Initial /71 (BP Location: Left arm, Patient Position: Chair, Cuff Size: Adult Regular)  Pulse 80  Temp 97  F (36.1  C) (Oral)  Wt 156 lb 6 oz (70.9 kg)  SpO2 96%  Breastfeeding? No  BMI 28.6 kg/m2 Estimated body mass index is 28.6 kg/(m^2) as calculated from the following:    Height as of 1/4/17: 5' 2\" (1.575 m).    Weight as of this encounter: 156 lb 6 oz (70.9 kg).  Medication Reconciliation: complete   Stephanie Mcfarland CMA      "

## 2017-08-23 NOTE — MR AVS SNAPSHOT
After Visit Summary   8/23/2017    Hemalatha Handy    MRN: [id number]           Patient Information     Date Of Birth          1940        Visit Information        Provider Department      8/23/2017 9:00 AM Javier Jones MD [facility]        Today's Diagnoses     Anxiety    -  1    Gastroesophageal reflux disease without esophagitis        Hypertension goal BP (blood pressure) < 140/90        Screening for diabetes mellitus        Fatigue, unspecified type        Hyperlipidemia LDL goal <100          Care Instructions    Keep working on healthy diet/exercise and wt loss    Stay on same medications    We will send you lab results           Follow-ups after your visit        Your next 10 appointments already scheduled     Aug 23, 2017  9:00 AM CDT   [facility]    [address] [place] [id number] [phone]           Bring a current list of meds and any records pertaining to this visit. For Physicals, please bring immunization records and any forms needing to be filled out. Please arrive 10 minutes early to complete paperwork.              Who to contact     If you have questions or need follow up information about today's clinic visit or your schedule please contact [facility] directly at [phone].  Normal or non-critical lab and imaging results will be communicated to you by MyChart, letter or phone within 4 business days after the clinic has received the results. If you do not hear from us within 7 days, please contact the clinic through MyChart or phone. If you have a critical or abnormal lab result, we will notify you by phone as soon as possible.  Submit refill requests through cooala - your brands or call your pharmacy and they will forward the refill request to us. Please allow 3 business days for your refill to be  "completed.          Additional Information About Your Visit        The RunthroughharCotap Information     [employer] lets you send messages to your doctor, view your test results, renew your prescriptions, schedule appointments and more. To sign up, go to [email or web address] . Click on \"Log in\" on the left side of the screen, which will take you to the Welcome page. Then click on \"Sign up Now\" on the right side of the page.     You will be asked to enter the access code listed below, as well as some personal information. Please follow the directions to create your username and password.     Your access code is: [email or web address]  Expires: 2017  8:25 AM     Your access code will  in 90 days. If you need help or a new code, please call your [place] clinic or [phone].        Care EveryWhere ID     This is your Care EveryWhere ID. This could be used by other organizations to access your [place] medical records  [id number]        Your Vitals Were     Pulse Temperature Pulse Oximetry Breastfeeding? BMI (Body Mass Index)       80 97  F (36.1  C) (Oral) 96% No 28.6 kg/m2        Blood Pressure from Last 3 Encounters:   17 135/71   17 122/67   04/15/17 128/76    Weight from Last 3 Encounters:   17 156 lb 6 oz (70.9 kg)   17 150 lb (68 kg)   04/15/17 154 lb (69.9 kg)              We Performed the Following     CBC with platelets differential     Comprehensive metabolic panel     Lipid panel reflex to direct LDL     TSH with free T4 reflex          Today's Medication Changes          These changes are accurate as of: 17  8:25 AM.  If you have any questions, ask your nurse or doctor.               These medicines have changed or have updated prescriptions.        Dose/Directions    citalopram 20 MG tablet   Commonly known as:  celeXA   This may have changed:  See the new instructions.   Used for:  Anxiety   Changed by:  Javier Jones MD        Dose:  20 mg   Take 1 tablet (20 mg) by mouth daily "   Quantity:  90 tablet   Refills:  3       lisinopril 10 MG tablet   Commonly known as:  PRINIVIL/ZESTRIL   This may have changed:  See the new instructions.   Used for:  Hypertension goal BP (blood pressure) < 140/90   Changed by:  Javier Jones MD        Dose:  10 mg   Take 1 tablet (10 mg) by mouth daily   Quantity:  90 tablet   Refills:  3       ranitidine 150 MG tablet   Commonly known as:  ZANTAC   This may have changed:  See the new instructions.   Used for:  Gastroesophageal reflux disease without esophagitis   Changed by:  Javier Jones MD        TAKE 1 TABLET (150 MG) BY MOUTH 2 TIMES DAILY   Quantity:  180 tablet   Refills:  3            Where to get your medicines      These medications were sent to [id number] - [place], [place] - [address]  [address] [facility] [address]     Phone:  [phone]     citalopram 20 MG tablet    lisinopril 10 MG tablet    ranitidine 150 MG tablet         Some of these will need a paper prescription and others can be bought over the counter.  Ask your nurse if you have questions.     Bring a paper prescription for each of these medications     ALPRAZolam 0.25 MG tablet                Primary Care Provider Office Phone # Fax #    Javier Jones [phone]       [address] [facility] [address]        Equal Access to Services     MAKAYLA HAMMONDS AH: Hadii rip matthew hadasho Soomaali, waaxda luqadaha, qaybta kaalmada adeegyada, tona hindsin haycatrachon gasper wilson. So [facility] [phone].    ATENCIÓN: Si habla español, tiene a jasmine disposición servicios gratuitos de asistencia lingüística. Llame al [phone].    We comply with applicable federal civil rights laws and [place] laws. We do not discriminate on the basis of race, color, national origin, age, disability sex, sexual orientation or gender identity.            Thank you!     Thank you for choosing [facility]  for your care.  Our goal is always to provide you with excellent care. Hearing back from our patients is one way we can continue to improve our services. Please take a few minutes to complete the written survey that you may receive in the mail after your visit with us. Thank you!             Your Updated Medication List - Protect others around you: Learn how to safely use, store and throw away your medicines at www.disposemymeds.org.          This list is accurate as of: 8/23/17  8:25 AM.  Always use your most recent med list.                   Brand Name Dispense Instructions for use Diagnosis    ALPRAZolam 0.25 MG tablet    XANAX    30 tablet    TAKE 1 TABLET BY MOUTH 3 TIMES DAILY AS NEEDED FOR ANXIETY    Anxiety       citalopram 20 MG tablet    celeXA    90 tablet    Take 1 tablet (20 mg) by mouth daily    Anxiety       lisinopril 10 MG tablet    PRINIVIL/ZESTRIL    90 tablet    Take 1 tablet (10 mg) by mouth daily    Hypertension goal BP (blood pressure) < 140/90       MOTRIN 800 MG tablet   Generic drug:  ibuprofen      1 TABLET 3 TIMES DAILY AS NEEDED        ranitidine 150 MG tablet    ZANTAC    180 tablet    TAKE 1 TABLET (150 MG) BY MOUTH 2 TIMES DAILY    Gastroesophageal reflux disease without esophagitis

## 2017-08-23 NOTE — PATIENT INSTRUCTIONS
Keep working on healthy diet/exercise and wt loss    Stay on same medications    We will send you lab results

## 2017-08-23 NOTE — PROGRESS NOTES
SUBJECTIVE:   Hemalatha Handy is a 77 year old female who presents to clinic today for the following health issues:       Hypertension Follow-up      Outpatient blood pressures are not being checked.    Low Salt Diet: not monitoring salt        Amount of exercise or physical activity: walks a lot    Problems taking medications regularly: No    Medication side effects: none  Diet: regular (no restrictions)      Anxiety recheck for medication    Problem list and histories reviewed & adjusted, as indicated.  Additional history: as documented         Reviewed and updated as needed this visit by clinical staffTobacco  Allergies  Meds  Problems  Med Hx  Surg Hx  Fam Hx  Soc Hx        Reviewed and updated as needed this visit by Provider          back hurting a lot recently especially on left side    Wears velcro back brace much of time    Walking is main exercise    Dealing with a nephew that has been problematic    Other family issues also    Usually one xanax a day.  Occasionally needs a 2nd.      Physical Exam   Constitutional: She is oriented to person, place, and time and well-developed, well-nourished, and in no distress. No distress.   HENT:   Head: Normocephalic and atraumatic.   Neck: Carotid bruit is not present.   Cardiovascular: Normal rate, regular rhythm, normal heart sounds and intact distal pulses.  Exam reveals no gallop and no friction rub.    No murmur heard.  Pulmonary/Chest: Effort normal and breath sounds normal.   Musculoskeletal: She exhibits no edema.   Neurological: She is alert and oriented to person, place, and time.   Skin: She is not diaphoretic.   Psychiatric: Mood and affect normal.     ASSESSMENT / PLAN:  (F41.9) Anxiety  (primary encounter diagnosis)  Comment: stable on meds.  No side effects.    Plan: citalopram (CELEXA) 20 MG tablet, ALPRAZolam         (XANAX) 0.25 MG tablet             (K21.9) Gastroesophageal reflux disease without esophagitis  Comment: stable   Plan:  ranitidine (ZANTAC) 150 MG tablet        Refill h2 blocker     (I10) Hypertension goal BP (blood pressure) < 140/90  Comment: at goal   Plan: lisinopril (PRINIVIL/ZESTRIL) 10 MG tablet        Refill     (Z13.1) Screening for diabetes mellitus  Comment: check sugar with cmp  Plan: could add hemoglobin a1c if high glc; fasting today     (R53.83) Fatigue, unspecified type  Comment: check   Plan: TSH with free T4 reflex, CBC with platelets         differential             (E78.5) Hyperlipidemia LDL goal <100  Comment: check fasting   Plan: Lipid panel reflex to direct LDL, Comprehensive        metabolic panel               I reviewed the patient's medications, allergies, medical history, family history, and social history.    Javier Jones MD

## 2017-08-23 NOTE — LETTER
[facility]  [address] [place]  [address]  [phone]        August 24, 2017    Hemalatha Handy  [address]  [place] [address]        Dear Hemalatha,    The blood sugar ( glucose ) is just a bit high, but not to diabetes range.  No medication needed for this.  Just keep working on healthy diet/exercise.     Cholesterol is better than last time.     Other labs are fine.     Results for orders placed or performed in visit on 08/23/17   Lipid panel reflex to direct LDL   Result Value Ref Range    Cholesterol 175 <200 mg/dL    Triglycerides 113 <150 mg/dL    HDL Cholesterol 51 >49 mg/dL    LDL Cholesterol Calculated 101 (H) <100 mg/dL    Non HDL Cholesterol 124 <130 mg/dL   Comprehensive metabolic panel   Result Value Ref Range    Sodium 142 133 - 144 mmol/L    Potassium 4.4 3.4 - 5.3 mmol/L    Chloride 107 94 - 109 mmol/L    Carbon Dioxide 30 20 - 32 mmol/L    Anion Gap 5 3 - 14 mmol/L    Glucose 106 (H) 70 - 99 mg/dL    Urea Nitrogen 15 7 - 30 mg/dL    Creatinine 0.60 0.52 - 1.04 mg/dL    GFR Estimate >90 >60 mL/min/1.7m2    GFR Estimate If Black >90 >60 mL/min/1.7m2    Calcium 8.6 8.5 - 10.1 mg/dL    Bilirubin Total 0.5 0.2 - 1.3 mg/dL    Albumin 3.5 3.4 - 5.0 g/dL    Protein Total 6.6 (L) 6.8 - 8.8 g/dL    Alkaline Phosphatase 83 40 - 150 U/L    ALT 25 0 - 50 U/L    AST 22 0 - 45 U/L   TSH with free T4 reflex   Result Value Ref Range    TSH 0.86 0.40 - 4.00 mU/L   CBC with platelets differential   Result Value Ref Range    WBC 4.0 4.0 - 11.0 10e9/L    RBC Count 4.14 3.8 - 5.2 10e12/L    Hemoglobin 13.5 11.7 - 15.7 g/dL    Hematocrit 40.3 35.0 - 47.0 %    MCV 97 78 - 100 fl    MCH 32.6 26.5 - 33.0 pg    MCHC 33.5 31.5 - 36.5 g/dL    RDW 12.9 10.0 - 15.0 %    Platelet Count 177 150 - 450 10e9/L    Diff Method Automated Method     % Neutrophils 58.9 %    % Lymphocytes 22.3 %    % Monocytes 12.8 %    % Eosinophils 5.5 %    % Basophils 0.5 %    Absolute  Neutrophil 2.4 1.6 - 8.3 10e9/L    Absolute Lymphocytes 0.9 0.8 - 5.3 10e9/L    Absolute Monocytes 0.5 0.0 - 1.3 10e9/L    Absolute Eosinophils 0.2 0.0 - 0.7 10e9/L    Absolute Basophils 0.0 0.0 - 0.2 10e9/L       If you have any questions please call the clinic at [phone].    Sincerely,    Javier KIML

## 2017-08-24 ASSESSMENT — ANXIETY QUESTIONNAIRES: GAD7 TOTAL SCORE: 8

## 2017-08-24 NOTE — PROGRESS NOTES
The blood sugar ( glucose ) is just a bit high, but not to diabetes range.  No medication needed for this.  Just keep working on healthy diet/exercise.    Cholesterol is better than last time.    Other labs are fine.    Javier Jones MD

## 2017-10-23 ENCOUNTER — TELEPHONE (OUTPATIENT)
Dept: FAMILY MEDICINE | Facility: CLINIC | Age: 77
End: 2017-10-23

## 2017-10-23 DIAGNOSIS — F41.9 ANXIETY: ICD-10-CM

## 2017-10-23 RX ORDER — ALPRAZOLAM 0.25 MG
TABLET ORAL
Qty: 30 TABLET | Refills: 0 | Status: SHIPPED | OUTPATIENT
Start: 2017-10-23 | End: 2017-11-30

## 2017-10-23 NOTE — TELEPHONE ENCOUNTER
Routing refill request to provider for review/approval because:  Drug not on the [facility] refill protocol       Nuris Monroe RN  [facility]

## 2017-10-23 NOTE — TELEPHONE ENCOUNTER
Controlled Substance Refill Request for Xanax 0.25mg    Last refill: 8/23/17 - 30qty    Last clinic visit: 8/23/17     Next appt: None with PCP    Controlled substance agreement on file: No.    Documentation in problem list reviewed:  Yes    Processing:  Fax Rx to pended pharmacy     Nuris Monroe RN  [facility]

## 2017-11-30 ENCOUNTER — TELEPHONE (OUTPATIENT)
Dept: FAMILY MEDICINE | Facility: CLINIC | Age: 77
End: 2017-11-30

## 2017-11-30 DIAGNOSIS — F41.9 ANXIETY: ICD-10-CM

## 2017-12-01 RX ORDER — ALPRAZOLAM 0.25 MG
TABLET ORAL
Qty: 30 TABLET | Refills: 0 | Status: SHIPPED | OUTPATIENT
Start: 2017-12-01 | End: 2018-01-02

## 2018-01-02 DIAGNOSIS — F41.9 ANXIETY: ICD-10-CM

## 2018-01-03 RX ORDER — ALPRAZOLAM 0.25 MG
TABLET ORAL
Qty: 30 TABLET | Refills: 0 | Status: SHIPPED | OUTPATIENT
Start: 2018-01-03 | End: 2018-02-13

## 2018-01-03 NOTE — TELEPHONE ENCOUNTER
Requested Prescriptions   Pending Prescriptions Disp Refills     ALPRAZolam (XANAX) 0.25 MG tablet [Pharmacy Med Name: ALPRAZolam Oral Tablet 0.25 MG] 30 tablet 0     Sig: TAKE ONE TABLET BY MOUTH THREE TIMES DAILY AS NEEDED FOR ANXIETY    There is no refill protocol information for this order         Last Written Prescription Date:  12-1-17  Last Fill Quantity: 30,   # refills: 0  Last Office Visit: 8-23-17  Future Office visit:       Routing refill request to provider for review/approval because:  Drug not on the [facility], [facility] or [facility] refill protocol or controlled substance

## 2018-02-13 ENCOUNTER — TELEPHONE (OUTPATIENT)
Dept: FAMILY MEDICINE | Facility: CLINIC | Age: 78
End: 2018-02-13

## 2018-02-13 DIAGNOSIS — F41.9 ANXIETY: ICD-10-CM

## 2018-02-13 RX ORDER — ALPRAZOLAM 0.25 MG
TABLET ORAL
Qty: 30 TABLET | Refills: 0 | Status: SHIPPED | OUTPATIENT
Start: 2018-02-13 | End: 2018-03-15

## 2018-02-13 NOTE — TELEPHONE ENCOUNTER
Requested Prescriptions   Pending Prescriptions Disp Refills     ALPRAZolam (XANAX) 0.25 MG tablet [Pharmacy Med Name: ALPRAZolam Oral Tablet 0.25 MG] 30 tablet 0     Sig: TAKE 1 TABLET BY MOUTH 3 TIMES DAILY AS NEEDED FOR ANXIETY    There is no refill protocol information for this order         Last Written Prescription Date:  1/3/18  Last Fill Quantity: 30,   # refills: 0  Last Office Visit: 8/23/17  Future Office visit:       Routing refill request to provider for review/approval because:  Drug not on the [facility], [facility] or [facility] refill protocol or controlled substance

## 2018-03-15 ENCOUNTER — TELEPHONE (OUTPATIENT)
Dept: FAMILY MEDICINE | Facility: CLINIC | Age: 78
End: 2018-03-15

## 2018-03-15 DIAGNOSIS — F41.9 ANXIETY: ICD-10-CM

## 2018-03-16 RX ORDER — ALPRAZOLAM 0.25 MG
TABLET ORAL
Qty: 30 TABLET | Refills: 0 | Status: SHIPPED | OUTPATIENT
Start: 2018-03-16 | End: 2018-04-24

## 2018-03-16 NOTE — TELEPHONE ENCOUNTER
Requested Prescriptions   Pending Prescriptions Disp Refills     ALPRAZolam (XANAX) 0.25 MG tablet [Pharmacy Med Name: ALPRAZolam Oral Tablet 0.25 MG] 30 tablet 0     Sig: TAKE ONE TABLET BY MOUTH THREE TIMES DAILY AS NEEDED AS NEEDED FOR ANXIETY    There is no refill protocol information for this order        Last Written Prescription Date:  2/13/2018  Last Fill Quantity: 30,  # refills: 0   Last office visit: 8/23/2017 with prescribing provider:  Deal - anxiety   Future Office Visit:    Routing refill request to provider for review/approval because:  Drug not on the [facility] refill protocol       Zandra Amador RN  [facility]

## 2018-04-24 ENCOUNTER — TELEPHONE (OUTPATIENT)
Dept: FAMILY MEDICINE | Facility: CLINIC | Age: 78
End: 2018-04-24

## 2018-04-24 DIAGNOSIS — F41.9 ANXIETY: ICD-10-CM

## 2018-04-24 RX ORDER — ALPRAZOLAM 0.25 MG
TABLET ORAL
Qty: 30 TABLET | Refills: 0 | Status: SHIPPED | OUTPATIENT
Start: 2018-04-24 | End: 2018-06-04

## 2018-04-24 NOTE — TELEPHONE ENCOUNTER
Prescription of ALPRAZolam (XANAX) 0.25 MG tablet was faxed to pharmacy.  Spoke with patient and informed, appointment made.

## 2018-04-24 NOTE — TELEPHONE ENCOUNTER
Requested Prescriptions   Pending Prescriptions Disp Refills     ALPRAZolam (XANAX) 0.25 MG tablet [Pharmacy Med Name: ALPRAZolam Oral Tablet 0.25 MG] 30 tablet 0     Sig: TAKE 1 TABLET BY MOUTH 3 TIMES DAILY AS NEEDED FOR ANXIETY    There is no refill protocol information for this order         Last Written Prescription Date:  3/16/18  Last Fill Quantity: 30,   # refills: 0  Last Office Visit: 8/23/17  Future Office visit:       Routing refill request to provider for review/approval because:  Drug not on the [facility], [facility] or [facility] refill protocol or controlled substance

## 2018-04-25 ENCOUNTER — OFFICE VISIT (OUTPATIENT)
Dept: FAMILY MEDICINE | Facility: CLINIC | Age: 78
End: 2018-04-25
Payer: COMMERCIAL

## 2018-04-25 VITALS
HEIGHT: 62 IN | WEIGHT: 152 LBS | SYSTOLIC BLOOD PRESSURE: 144 MMHG | DIASTOLIC BLOOD PRESSURE: 70 MMHG | TEMPERATURE: 98 F | BODY MASS INDEX: 27.97 KG/M2 | HEART RATE: 74 BPM

## 2018-04-25 DIAGNOSIS — R19.7 DIARRHEA, UNSPECIFIED TYPE: ICD-10-CM

## 2018-04-25 DIAGNOSIS — E66.3 OVERWEIGHT: ICD-10-CM

## 2018-04-25 DIAGNOSIS — F41.9 ANXIETY: Primary | ICD-10-CM

## 2018-04-25 DIAGNOSIS — K21.9 GASTROESOPHAGEAL REFLUX DISEASE WITHOUT ESOPHAGITIS: ICD-10-CM

## 2018-04-25 DIAGNOSIS — I10 HYPERTENSION GOAL BP (BLOOD PRESSURE) < 140/90: ICD-10-CM

## 2018-04-25 DIAGNOSIS — K76.0 FATTY LIVER: ICD-10-CM

## 2018-04-25 PROCEDURE — 99214 OFFICE O/P EST MOD 30 MIN: CPT | Performed by: FAMILY MEDICINE

## 2018-04-25 RX ORDER — LISINOPRIL 10 MG/1
10 TABLET ORAL DAILY
Qty: 90 TABLET | Refills: 1 | Status: SHIPPED | OUTPATIENT
Start: 2018-04-25 | End: 2018-08-13

## 2018-04-25 RX ORDER — CITALOPRAM HYDROBROMIDE 20 MG/1
20 TABLET ORAL DAILY
Qty: 90 TABLET | Refills: 1 | Status: SHIPPED | OUTPATIENT
Start: 2018-04-25 | End: 2018-08-13

## 2018-04-25 ASSESSMENT — PAIN SCALES - GENERAL: PAINLEVEL: NO PAIN (0)

## 2018-04-25 NOTE — PROGRESS NOTES
SUBJECTIVE:   Hemalatha Handy is a 77 year old female who presents to clinic today for the following health issues:       Medication Followup of Alprazolam    Taking Medication as prescribed: yes    Side Effects:  None    Medication Helping Symptoms:  yes       none    Problem list and histories reviewed & adjusted, as indicated.  Additional history: as documented         Reviewed and updated as needed this visit by clinical staff  Tobacco  Allergies  Meds  Med Hx  Surg Hx  Fam Hx  Soc Hx      Reviewed and updated as needed this visit by Provider          no new problems    Gaining wt    No new problems      Overall not much wt change    Not a great diet    Walk a lot    No chest pain/ breatthing problems    Xanax just at night, not every night    Not during day    1 pill does it    Occasionally just a 1/2    Sometimes feels hypoglycemic    Very rare etoh    Occasional diarrhea  Can't have lettuce  Heavy breakfast is bad        Physical Exam   Constitutional: She is oriented to person, place, and time and well-developed, well-nourished, and in no distress. No distress.   HENT:   Head: Normocephalic and atraumatic.   Neck: Carotid bruit is not present.   Cardiovascular: Normal rate, regular rhythm, normal heart sounds and intact distal pulses.  Exam reveals no gallop and no friction rub.    No murmur heard.  Pulmonary/Chest: Effort normal and breath sounds normal.   Abdominal: Soft. There is no tenderness.   Musculoskeletal: She exhibits no edema.   Neurological: She is alert and oriented to person, place, and time.   Skin: She is not diaphoretic.   Psychiatric: Mood and affect normal.         ASSESSMENT / PLAN:  (F41.9) Anxiety  (primary encounter diagnosis)  Comment: we did refill the alprazolam yesterday.  Patient to get prescription from pharmacy today.  Takes at night if needed, chronic med for patient.  Also may need refill citalopram soon.   Plan: citalopram (CELEXA) 20 MG tablet             (I10)  Hypertension goal BP (blood pressure) < 140/90  Comment: almost to goal  Plan: lisinopril (PRINIVIL/ZESTRIL) 10 MG tablet        Continue med as is.     (K21.9) Gastroesophageal reflux disease without esophagitis  Comment: needs refill.  Stable.  Plan: ranitidine (ZANTAC) 150 MG tablet             (E66.3) Overweight  Comment: discussed in detail   Plan: keep working on healthy diet/exercise and wt loss     (R19.7) Diarrhea, unspecified type  Comment: related to certain foods  Plan: monitor     (K76.0) Fatty liver  Comment: mild.  Seen on CT.  Liver tests have been okay   Plan: monitor. Main goal is wt loss, discussed.    See us in August/ September.  Can do labs then.     Come in sooner if needed.      I reviewed the patient's medications, allergies, medical history, family history, and social history.    Javier Jones MD

## 2018-04-25 NOTE — MR AVS SNAPSHOT
"              After Visit Summary   4/25/2018    Hemalatha Handy    MRN: [id number]           Patient Information     Date Of Birth          1940        Visit Information        Provider Department      4/25/2018 11:20 AM Javier Jones MD [facility]        Today's Diagnoses     Anxiety        Hypertension goal BP (blood pressure) < 140/90        Gastroesophageal reflux disease without esophagitis          Care Instructions    Increase exercise / walking    Decrease calorie intake    Low salt, low fat, low sugar diet    See us back in late summer or September     Will do labs at that time            Follow-ups after your visit        Who to contact     If you have questions or need follow up information about today's clinic visit or your schedule please contact [facility] directly at [phone].  Normal or non-critical lab and imaging results will be communicated to you by MyChart, letter or phone within 4 business days after the clinic has received the results. If you do not hear from us within 7 days, please contact the clinic through [facility] or phone. If you have a critical or abnormal lab result, we will notify you by phone as soon as possible.  Submit refill requests through [employer] or call your pharmacy and they will forward the refill request to us. Please allow 3 business days for your refill to be completed.          Additional Information About Your Visit        [email or web address] Information     [employer] lets you send messages to your doctor, view your test results, renew your prescriptions, schedule appointments and more. To sign up, go to [email or web address] . Click on \"Log in\" on the left side of the screen, which will take you to the Welcome page. Then click on \"Sign up Now\" on the right side of the page.     You will be asked to enter the access code listed below, as well as some personal information. Please follow the directions to create your " "username and password.     Your access code is: [email or web address]  Expires: 2018 12:02 PM     Your access code will  in 90 days. If you need help or a new code, please call your [place] clinic or [phone].        Care EveryWhere ID     This is your Care EveryWhere ID. This could be used by other organizations to access your [place] medical records  [id number]        Your Vitals Were     Pulse Temperature Height Breastfeeding? BMI (Body Mass Index)       74 98  F (36.7  C) (Oral) 5' 2\" (1.575 m) No 27.8 kg/m2        Blood Pressure from Last 3 Encounters:   18 144/70   17 135/71   17 122/67    Weight from Last 3 Encounters:   18 152 lb (68.9 kg)   17 156 lb 6 oz (70.9 kg)   17 150 lb (68 kg)              Today, you had the following     No orders found for display         Where to get your medicines      These medications were sent to [id number] - [place], [place] - [address] [facility] [address]  [address] [address]     Phone:  [phone]     citalopram 20 MG tablet    lisinopril 10 MG tablet    ranitidine 150 MG tablet          Primary Care Provider Office Phone # Fax #    Javier Jones [phone]       [address] [facility] [address]        Equal Access to Services     [facility]: Hadii rip matthew hadasho Soboubacar, waaxda luqadaha, qaybta kaalmada tona rea . So [facility] [phone].    ATENCIÓN: Si habla español, tiene a jasmine disposición servicios gratuitos de asistencia lingüística. Llame al [phone].    We comply with applicable federal civil rights laws and [place] laws. We do not discriminate on the basis of race, color, national origin, age, disability, sex, sexual orientation, or gender identity.            Thank you!     Thank you for choosing [facility]  for your care. Our goal is always to provide you with excellent care. Hearing back " from our patients is one way we can continue to improve our services. Please take a few minutes to complete the written survey that you may receive in the mail after your visit with us. Thank you!             Your Updated Medication List - Protect others around you: Learn how to safely use, store and throw away your medicines at www.disposemymeds.org.          This list is accurate as of 4/25/18 12:03 PM.  Always use your most recent med list.                   Brand Name Dispense Instructions for use Diagnosis    ALPRAZolam 0.25 MG tablet    XANAX    30 tablet    TAKE 1 TABLET BY MOUTH 3 TIMES DAILY AS NEEDED FOR ANXIETY    Anxiety       citalopram 20 MG tablet    celeXA    90 tablet    Take 1 tablet (20 mg) by mouth daily    Anxiety       lisinopril 10 MG tablet    PRINIVIL/ZESTRIL    90 tablet    Take 1 tablet (10 mg) by mouth daily    Hypertension goal BP (blood pressure) < 140/90       MOTRIN 800 MG tablet   Generic drug:  ibuprofen      1 TABLET 3 TIMES DAILY AS NEEDED        ranitidine 150 MG tablet    ZANTAC    180 tablet    TAKE 1 TABLET (150 MG) BY MOUTH 2 TIMES DAILY    Gastroesophageal reflux disease without esophagitis

## 2018-04-25 NOTE — PATIENT INSTRUCTIONS
Increase exercise / walking    Decrease calorie intake    Low salt, low fat, low sugar diet    See us back in late summer or September     Will do labs at that time

## 2018-05-07 DIAGNOSIS — R31.29 MICROSCOPIC HEMATURIA: ICD-10-CM

## 2018-05-07 LAB
ALBUMIN UR-MCNC: NEGATIVE MG/DL
APPEARANCE UR: CLEAR
BILIRUB UR QL STRIP: NEGATIVE
COLOR UR AUTO: YELLOW
GLUCOSE UR STRIP-MCNC: NEGATIVE MG/DL
HGB UR QL STRIP: NEGATIVE
KETONES UR STRIP-MCNC: NEGATIVE MG/DL
LEUKOCYTE ESTERASE UR QL STRIP: NEGATIVE
NITRATE UR QL: NEGATIVE
NON-SQ EPI CELLS #/AREA URNS LPF: NORMAL /LPF
PH UR STRIP: 6 PH (ref 5–7)
RBC #/AREA URNS AUTO: NORMAL /HPF
SOURCE: NORMAL
SP GR UR STRIP: 1.02 (ref 1–1.03)
UROBILINOGEN UR STRIP-ACNC: 0.2 EU/DL (ref 0.2–1)
WBC #/AREA URNS AUTO: NORMAL /HPF

## 2018-05-07 PROCEDURE — 81001 URINALYSIS AUTO W/SCOPE: CPT | Performed by: INTERNAL MEDICINE

## 2018-06-04 DIAGNOSIS — F41.9 ANXIETY: ICD-10-CM

## 2018-06-04 NOTE — TELEPHONE ENCOUNTER
Requested Prescriptions   Pending Prescriptions Disp Refills     ALPRAZolam (XANAX) 0.25 MG tablet [Pharmacy Med Name: ALPRAZolam Oral Tablet 0.25 MG] 30 tablet 0     Sig: TAKE ONE TABLET BY MOUTH THREE TIMES DAILY AS NEEDED FOR ANXIETY    There is no refill protocol information for this order         Last Written Prescription Date:  4-24-18  Last Fill Quantity: 30,   # refills: 0  Last Office Visit:   Future Office visit:       Routing refill request to provider for review/approval because:  Drug not on the [facility], [facility] or [facility] refill protocol or controlled substance

## 2018-06-05 RX ORDER — ALPRAZOLAM 0.25 MG
TABLET ORAL
Qty: 30 TABLET | Refills: 0 | Status: SHIPPED | OUTPATIENT
Start: 2018-06-05 | End: 2018-07-05

## 2018-06-05 NOTE — TELEPHONE ENCOUNTER
Routing refill request to provider for review/approval because:  Drug not on the [facility] refill protocol       Zandra Amador RN  [facility]

## 2018-07-05 DIAGNOSIS — F41.9 ANXIETY: ICD-10-CM

## 2018-07-05 RX ORDER — ALPRAZOLAM 0.25 MG
TABLET ORAL
Qty: 30 TABLET | Refills: 0 | Status: SHIPPED | OUTPATIENT
Start: 2018-07-05 | End: 2018-08-13

## 2018-07-05 NOTE — TELEPHONE ENCOUNTER
Requested Prescriptions   Pending Prescriptions Disp Refills     ALPRAZolam (XANAX) 0.25 MG tablet [Pharmacy Med Name: ALPRAZolam Oral Tablet 0.25 MG] 30 tablet 0     Sig: TAKE ONE TABLET BY MOUTH THREE TIMES DAILY AS NEEDED FOR ANXIETY    There is no refill protocol information for this order        Last Written Prescription Date:  6/5/2018  Last Fill Quantity: 30,  # refills: 0   Last office visit: 4/25/2018 with prescribing provider:  Deal - anxiety   Future Office Visit:    Routing refill request to provider for review/approval because:  Drug not on the [facility] refill protocol       Zandra Amador RN  [facility]

## 2018-08-07 NOTE — TELEPHONE ENCOUNTER
Routing refill request to provider for review/approval because:  Drug not active on patient's medication list    Route to PCP    Mary Ann Chairez RN

## 2018-08-07 NOTE — TELEPHONE ENCOUNTER
Requested Prescriptions   Pending Prescriptions Disp Refills     SF 5000 PLUS 1.1 % CREA [Pharmacy Med Name: SF 5000 PLUS 1.1% CREAM 51GM] 357 g 4     Sig: BRUSH EVERY NIGHT AND SPIT. DO NOT RINSE    There is no refill protocol information for this order         Last Written Prescription Date:  na  Last Fill Quantity: na,   # refills: na  Last Office Visit: 4/25/18  Future Office visit:       Routing refill request to provider for review/approval because:  Drug not active on patient's medication list

## 2018-08-09 RX ORDER — 1.1% SODIUM FLUORIDE PRESCRIPTION DENTAL CREAM 5 MG/G
CREAM DENTAL
Qty: 357 G | Refills: 4 | OUTPATIENT
Start: 2018-08-09

## 2018-08-09 NOTE — TELEPHONE ENCOUNTER
"I called Hemalatha, she says this is special toothpaste her dentist should prescribe.  She recently had all her Rx's transferred from [facility] to [facility] so [facility] probably did not know who to get refills from.    She does not know the name of her dentist but is out of [facility] at [address] [place] [address].    \"Refusal\" routed back to pharmacy with note.    Mellissa Abad RN  [facility]      "

## 2018-08-13 DIAGNOSIS — F41.9 ANXIETY: ICD-10-CM

## 2018-08-13 DIAGNOSIS — K21.9 GASTROESOPHAGEAL REFLUX DISEASE WITHOUT ESOPHAGITIS: ICD-10-CM

## 2018-08-13 DIAGNOSIS — I10 HYPERTENSION GOAL BP (BLOOD PRESSURE) < 140/90: ICD-10-CM

## 2018-08-13 RX ORDER — ALPRAZOLAM 0.25 MG
TABLET ORAL
Qty: 30 TABLET | Refills: 0 | Status: SHIPPED | OUTPATIENT
Start: 2018-08-13 | End: 2018-09-14

## 2018-08-13 RX ORDER — CITALOPRAM HYDROBROMIDE 20 MG/1
20 TABLET ORAL DAILY
Qty: 30 TABLET | Refills: 0 | Status: SHIPPED | OUTPATIENT
Start: 2018-08-13 | End: 2018-08-13

## 2018-08-13 RX ORDER — LISINOPRIL 10 MG/1
10 TABLET ORAL DAILY
Qty: 30 TABLET | Refills: 0 | Status: SHIPPED | OUTPATIENT
Start: 2018-08-13 | End: 2018-08-13

## 2018-08-13 NOTE — TELEPHONE ENCOUNTER
"Requested Prescriptions   Pending Prescriptions Disp Refills     citalopram (CELEXA) 20 MG tablet [Pharmacy Med Name: CITALOPRAM 20MG TABLETS] 90 tablet 0    Last Written Prescription Date:  8/13/18  Last Fill Quantity: 30,  # refills: 0   Last office visit: 4/25/2018 with prescribing provider:     Future Office Visit:     Sig: TAKE 1 TABLET BY MOUTH DAILY    SSRIs Protocol Passed    8/13/2018  2:26 PM       Passed - Recent (12 mo) or future (30 days) visit within the authorizing provider's specialty    Patient had office visit in the last 12 months or has a visit in the next 30 days with authorizing provider or within the authorizing provider's specialty.  See \"Patient Info\" tab in inbasket, or \"Choose Columns\" in Meds & Orders section of the refill encounter.           Passed - Patient is age 18 or older       Passed - No active pregnancy on record       Passed - No positive pregnancy test in last 12 months        lisinopril (PRINIVIL/ZESTRIL) 10 MG tablet [Pharmacy Med Name: LISINOPRIL 10MG TABLETS] 90 tablet 0    Last Written Prescription Date:  8/13/18  Last Fill Quantity: 30,  # refills: 0   Last office visit: 4/25/2018 with prescribing provider:     Future Office Visit:     Sig: TAKE 1 TABLET BY MOUTH DAILY    ACE Inhibitors (Including Combos) Protocol Failed    8/13/2018  2:26 PM       Failed - Blood pressure under 140/90 in past 12 months    BP Readings from Last 3 Encounters:   04/25/18 144/70   08/23/17 135/71   05/17/17 122/67                Passed - Recent (12 mo) or future (30 days) visit within the authorizing provider's specialty    Patient had office visit in the last 12 months or has a visit in the next 30 days with authorizing provider or within the authorizing provider's specialty.  See \"Patient Info\" tab in inbasket, or \"Choose Columns\" in Meds & Orders section of the refill encounter.           Passed - Patient is age 18 or older       Passed - No active pregnancy on record       Passed - " Normal serum creatinine on file in past 12 months    Recent Labs   Lab Test  08/23/17   0843   CR  0.60            Passed - Normal serum potassium on file in past 12 months    Recent Labs   Lab Test  08/23/17   0843   POTASSIUM  4.4            Passed - No positive pregnancy test in past 12 months

## 2018-08-13 NOTE — LETTER
Dear Hemalatha,         Your Care Team has attempted to reach you several times regarding a recent refill request for your citalopram and lisinopril. We have provided a one time refill at this time. You are overdue to be seen for your annual physical and fasting labwork. Please contact the clinic to schedule an appointment at [phone].      Sincerely,    Javier MONTOYA

## 2018-08-13 NOTE — TELEPHONE ENCOUNTER
Reason for Call:  Medication or medication refill:    Do you use a [facility]?  Name of the pharmacy and phone number for the current request:  [facility] [address] [place] [address], [place] [address] AT [place] [address]    Name of the medication requested: ALPRAZolam (XANAX) 0.25 MG tablet    Other request:  Needs all her other script refilled to and send to [facility]    Can we leave a detailed message on this number? YES    Phone number patient can be reached at: Home number on file [phone] (home)    Best Time:  Anytime     Call taken on 8/13/2018 at 1:15 PM by Charu Fox

## 2018-08-13 NOTE — TELEPHONE ENCOUNTER
"Requested Prescriptions   Pending Prescriptions Disp Refills     ALPRAZolam (XANAX) 0.25 MG tablet 30 tablet 0     Sig: TAKE ONE TABLET BY MOUTH THREE TIMES DAILY AS NEEDED FOR ANXIETY    There is no refill protocol information for this order        citalopram (CELEXA) 20 MG tablet 90 tablet 1     Sig: Take 1 tablet (20 mg) by mouth daily    SSRIs Protocol Passed    8/13/2018  1:58 PM       Passed - Recent (12 mo) or future (30 days) visit within the authorizing provider's specialty    Patient had office visit in the last 12 months or has a visit in the next 30 days with authorizing provider or within the authorizing provider's specialty.  See \"Patient Info\" tab in inbasket, or \"Choose Columns\" in Meds & Orders section of the refill encounter.           Passed - Patient is age 18 or older       Passed - No active pregnancy on record       Passed - No positive pregnancy test in last 12 months        lisinopril (PRINIVIL/ZESTRIL) 10 MG tablet 90 tablet 1     Sig: Take 1 tablet (10 mg) by mouth daily    ACE Inhibitors (Including Combos) Protocol Failed    8/13/2018  1:58 PM       Failed - Blood pressure under 140/90 in past 12 months    BP Readings from Last 3 Encounters:   04/25/18 144/70   08/23/17 135/71   05/17/17 122/67                Passed - Recent (12 mo) or future (30 days) visit within the authorizing provider's specialty    Patient had office visit in the last 12 months or has a visit in the next 30 days with authorizing provider or within the authorizing provider's specialty.  See \"Patient Info\" tab in inbasket, or \"Choose Columns\" in Meds & Orders section of the refill encounter.           Passed - Patient is age 18 or older       Passed - No active pregnancy on record       Passed - Normal serum creatinine on file in past 12 months    Recent Labs   Lab Test  08/23/17   0843   CR  0.60            Passed - Normal serum potassium on file in past 12 months    Recent Labs   Lab Test  08/23/17   0843 " "  POTASSIUM  4.4            Passed - No positive pregnancy test in past 12 months        ranitidine (ZANTAC) 150 MG tablet 180 tablet 1     Sig: TAKE 1 TABLET (150 MG) BY MOUTH 2 TIMES DAILY    H2 Blockers Protocol Passed    8/13/2018  1:58 PM       Passed - Patient is age 12 or older       Passed - Recent (12 mo) or future (30 days) visit within the authorizing provider's specialty    Patient had office visit in the last 12 months or has a visit in the next 30 days with authorizing provider or within the authorizing provider's specialty.  See \"Patient Info\" tab in inbasket, or \"Choose Columns\" in Meds & Orders section of the refill encounter.              "

## 2018-08-13 NOTE — TELEPHONE ENCOUNTER
"Patient was last seen by Dr. Jones 4/25/18.    Due back in August/September per plan at that visit.    She should not need refills on \"all her other scripts\".   She should be able to transfer from [facility].  I called [facility], they don't have refills on citalopram or lisinopril for some reason.   I will just plan to send refills to new pharmacy on all if appropriate.    Routed to refill pool for entry into protocol.    Mellissa Abad RN  [facility]            "

## 2018-08-15 RX ORDER — CITALOPRAM HYDROBROMIDE 20 MG/1
TABLET ORAL
Qty: 90 TABLET | Refills: 0 | Status: SHIPPED | OUTPATIENT
Start: 2018-08-15 | End: 2018-09-14

## 2018-08-15 RX ORDER — LISINOPRIL 10 MG/1
TABLET ORAL
Qty: 90 TABLET | Refills: 0 | Status: SHIPPED | OUTPATIENT
Start: 2018-08-15 | End: 2018-09-14

## 2018-08-15 NOTE — TELEPHONE ENCOUNTER
Routing refill request to provider for review/approval because:  RX Protocol Failed.  Please review refill.  Thank you.  Ernestina Garcias RN

## 2018-08-16 NOTE — TELEPHONE ENCOUNTER
Okayed refills but advise clinic appointment in the next couple months    Please inform patient    Javier Jones MD

## 2018-09-14 ENCOUNTER — OFFICE VISIT (OUTPATIENT)
Dept: FAMILY MEDICINE | Facility: CLINIC | Age: 78
End: 2018-09-14
Payer: COMMERCIAL

## 2018-09-14 VITALS
SYSTOLIC BLOOD PRESSURE: 108 MMHG | OXYGEN SATURATION: 96 % | HEART RATE: 84 BPM | BODY MASS INDEX: 28.17 KG/M2 | WEIGHT: 154 LBS | DIASTOLIC BLOOD PRESSURE: 60 MMHG | TEMPERATURE: 97.4 F

## 2018-09-14 DIAGNOSIS — I10 HYPERTENSION GOAL BP (BLOOD PRESSURE) < 140/90: Primary | ICD-10-CM

## 2018-09-14 DIAGNOSIS — R53.83 FATIGUE, UNSPECIFIED TYPE: ICD-10-CM

## 2018-09-14 DIAGNOSIS — R73.01 IMPAIRED FASTING GLUCOSE: ICD-10-CM

## 2018-09-14 DIAGNOSIS — E78.5 HYPERLIPIDEMIA LDL GOAL <100: ICD-10-CM

## 2018-09-14 DIAGNOSIS — K21.9 GASTROESOPHAGEAL REFLUX DISEASE WITHOUT ESOPHAGITIS: ICD-10-CM

## 2018-09-14 DIAGNOSIS — F41.9 ANXIETY: ICD-10-CM

## 2018-09-14 PROCEDURE — 99214 OFFICE O/P EST MOD 30 MIN: CPT | Performed by: FAMILY MEDICINE

## 2018-09-14 RX ORDER — CITALOPRAM HYDROBROMIDE 20 MG/1
20 TABLET ORAL DAILY
Qty: 90 TABLET | Refills: 3 | Status: SHIPPED | OUTPATIENT
Start: 2018-09-14 | End: 2019-06-21

## 2018-09-14 RX ORDER — ALPRAZOLAM 0.25 MG
TABLET ORAL
Qty: 30 TABLET | Refills: 0 | Status: SHIPPED | OUTPATIENT
Start: 2018-09-14 | End: 2018-10-15

## 2018-09-14 RX ORDER — LISINOPRIL 10 MG/1
10 TABLET ORAL DAILY
Qty: 90 TABLET | Refills: 3 | Status: SHIPPED | OUTPATIENT
Start: 2018-09-14 | End: 2019-06-21

## 2018-09-14 NOTE — PATIENT INSTRUCTIONS
Stay on same medications    Schedule fasting lab only appointment    Keep working on healthy diet/exercise

## 2018-09-14 NOTE — MR AVS SNAPSHOT
After Visit Summary   9/14/2018    Hemalatha Handy    MRN: [id number]           Patient Information     Date Of Birth          1940        Visit Information        Provider Department      9/14/2018 4:00 PM Javier Jones MD [facility]        Today's Diagnoses     Hyperlipidemia LDL goal <100    -  1    Fatigue, unspecified type        Impaired fasting glucose        Hypertension goal BP (blood pressure) < 140/90        Anxiety        Gastroesophageal reflux disease without esophagitis          Care Instructions    Stay on same medications    Schedule fasting lab only appointment    Keep working on healthy diet/exercise           Follow-ups after your visit        Future tests that were ordered for you today     Open Future Orders        Priority Expected Expires Ordered    Lipid panel reflex to direct LDL Fasting Routine  2/14/2019 9/14/2018    Comprehensive metabolic panel Routine  2/14/2019 9/14/2018    CBC with platelets differential Routine  2/14/2019 9/14/2018    TSH with free T4 reflex Routine  2/14/2019 9/14/2018    Hemoglobin A1c Routine  2/14/2019 9/14/2018            Who to contact     If you have questions or need follow up information about today's clinic visit or your schedule please contact [facility] directly at [phone].  Normal or non-critical lab and imaging results will be communicated to you by MyChart, letter or phone within 4 business days after the clinic has received the results. If you do not hear from us within 7 days, please contact the clinic through MyChart or phone. If you have a critical or abnormal lab result, we will notify you by phone as soon as possible.  Submit refill requests through [employer] or call your pharmacy and they will forward the refill request to us. Please allow 3 business days for your refill to be completed.          Additional Information About Your Visit        Care EveryWhere ID     This  is your Care EveryWhere ID. This could be used by other organizations to access your [facility] medical records  [id number]        Your Vitals Were     Pulse Temperature Pulse Oximetry BMI (Body Mass Index)          84 97.4  F (36.3  C) (Oral) 96% 28.17 kg/m2         Blood Pressure from Last 3 Encounters:   09/14/18 108/60   04/25/18 144/70   08/23/17 135/71    Weight from Last 3 Encounters:   09/14/18 154 lb (69.9 kg)   04/25/18 152 lb (68.9 kg)   08/23/17 156 lb 6 oz (70.9 kg)                 Today's Medication Changes          These changes are accurate as of 9/14/18  4:04 PM.  If you have any questions, ask your nurse or doctor.               These medicines have changed or have updated prescriptions.        Dose/Directions    ALPRAZolam 0.25 MG tablet   Commonly known as:  XANAX   This may have changed:  additional instructions   Used for:  Anxiety        TAKE ONE TABLET BY MOUTH THREE TIMES DAILY AS NEEDED FOR ANXIETY   Quantity:  30 tablet   Refills:  0       citalopram 20 MG tablet   Commonly known as:  celeXA   This may have changed:  See the new instructions.   Used for:  Anxiety   Changed by:  Javier Jones MD        Dose:  20 mg   Take 1 tablet (20 mg) by mouth daily   Quantity:  90 tablet   Refills:  3       lisinopril 10 MG tablet   Commonly known as:  PRINIVIL/ZESTRIL   This may have changed:  See the new instructions.   Used for:  Hypertension goal BP (blood pressure) < 140/90   Changed by:  Javier Jones MD        Dose:  10 mg   Take 1 tablet (10 mg) by mouth daily   Quantity:  90 tablet   Refills:  3            Where to get your medicines      These medications were sent to [id number] - [place], [place] - [address] [place] [address]  [address], [place] [facility] [address]     Phone:  [phone]     citalopram 20 MG tablet    lisinopril 10 MG tablet    ranitidine 150 MG tablet         Some of these will need a paper prescription and others can be bought  over the counter.  Ask your nurse if you have questions.     Bring a paper prescription for each of these medications     ALPRAZolam 0.25 MG tablet                Primary Care Provider Office Phone # Fax #    Javier Jones [phone]       [address] [address]        Equal Access to Services     MAKAYLA HAMMONDS : Hadii aad ku hadasho Soomaali, waaxda luqadaha, qaybta kaalmada adeegyada, waxay guzmanin hayaan adeeg kallichrismehrdad laramila wilson. So [facility] [phone].    ATENCIÓN: Si habla español, tiene a jasmine disposición servicios gratuitos de asistencia lingüística. Llame al [phone].    We comply with applicable federal civil rights laws and [place] laws. We do not discriminate on the basis of race, color, national origin, age, disability, sex, sexual orientation, or gender identity.            Thank you!     Thank you for choosing [facility]  for your care. Our goal is always to provide you with excellent care. Hearing back from our patients is one way we can continue to improve our services. Please take a few minutes to complete the written survey that you may receive in the mail after your visit with us. Thank you!             Your Updated Medication List - Protect others around you: Learn how to safely use, store and throw away your medicines at www.disposemymeds.org.          This list is accurate as of 9/14/18  4:04 PM.  Always use your most recent med list.                   Brand Name Dispense Instructions for use Diagnosis    ALPRAZolam 0.25 MG tablet    XANAX    30 tablet    TAKE ONE TABLET BY MOUTH THREE TIMES DAILY AS NEEDED FOR ANXIETY    Anxiety       citalopram 20 MG tablet    celeXA    90 tablet    Take 1 tablet (20 mg) by mouth daily    Anxiety       lisinopril 10 MG tablet    PRINIVIL/ZESTRIL    90 tablet    Take 1 tablet (10 mg) by mouth daily    Hypertension goal BP (blood pressure) < 140/90       MOTRIN 800 MG tablet   Generic drug:   ibuprofen      1 TABLET 3 TIMES DAILY AS NEEDED        ranitidine 150 MG tablet    ZANTAC    180 tablet    TAKE 1 TABLET (150 MG) BY MOUTH 2 TIMES DAILY    Gastroesophageal reflux disease without esophagitis

## 2018-09-14 NOTE — PROGRESS NOTES
SUBJECTIVE:   Hemalatha Handy is a 78 year old female who presents to clinic today for the following health issues:      Hypertension Follow-up      Outpatient blood pressures are not being checked.    Low Salt Diet: low salt      Amount of exercise or physical activity: walking    Problems taking medications regularly: No    Medication side effects: none    Diet: regular (no restrictions)             Problem list and histories reviewed & adjusted, as indicated.  Additional history: as documented         Reviewed and updated as needed this visit by clinical staff  Tobacco  Allergies  Meds  Med Hx  Surg Hx  Fam Hx  Soc Hx      Reviewed and updated as needed this visit by Provider          walking a lot     Anxiety okay    No acid taste coming up     No chest pain/ breathing problems      Physical Exam   Constitutional: She is oriented to person, place, and time and well-developed, well-nourished, and in no distress. No distress.   HENT:   Head: Normocephalic and atraumatic.   Neck: Carotid bruit is not present.   Cardiovascular: Normal rate, regular rhythm, normal heart sounds and intact distal pulses.  Exam reveals no gallop and no friction rub.    No murmur heard.  Pulmonary/Chest: Effort normal and breath sounds normal.   Musculoskeletal: She exhibits no edema.   Neurological: She is alert and oriented to person, place, and time.   Skin: She is not diaphoretic.   Psychiatric: Mood and affect normal.     ASSESSMENT / PLAN:  (I10) Hypertension goal BP (blood pressure) < 140/90  (primary encounter diagnosis)  Comment: blood pressure okay   Plan: lisinopril (PRINIVIL/ZESTRIL) 10 MG tablet        Refill med     (E78.5) Hyperlipidemia LDL goal <100  Comment: check labs when fasting   Plan: Lipid panel reflex to direct LDL Fasting,         Comprehensive metabolic panel        Patient to schedule appointment     (R53.83) Fatigue, unspecified type  Comment: check   Plan: CBC with platelets differential, TSH with  free         T4 reflex             (R73.01) Impaired fasting glucose  Comment: check   Plan: Hemoglobin A1c             (F41.9) Anxiety  Comment: patient stable on scheduled daily citalopram.  Patient takes one alprazolam most but not all nights.  Plan: citalopram (CELEXA) 20 MG tablet, ALPRAZolam         (XANAX) 0.25 MG tablet         No history of problems with these meds for patient.     (K21.9) Gastroesophageal reflux disease without esophagitis  Comment: stable on h2 blocker.   Plan: ranitidine (ZANTAC) 150 MG tablet        Refill.       I reviewed the patient's medications, allergies, medical history, family history, and social history.    Javier Jones MD

## 2018-10-18 DIAGNOSIS — E78.5 HYPERLIPIDEMIA LDL GOAL <100: ICD-10-CM

## 2018-10-18 DIAGNOSIS — R53.83 FATIGUE, UNSPECIFIED TYPE: ICD-10-CM

## 2018-10-18 DIAGNOSIS — R73.01 IMPAIRED FASTING GLUCOSE: ICD-10-CM

## 2018-10-18 LAB
ALBUMIN SERPL-MCNC: 3.6 G/DL (ref 3.4–5)
ALP SERPL-CCNC: 102 U/L (ref 40–150)
ALT SERPL W P-5'-P-CCNC: 21 U/L (ref 0–50)
ANION GAP SERPL CALCULATED.3IONS-SCNC: 6 MMOL/L (ref 3–14)
AST SERPL W P-5'-P-CCNC: 18 U/L (ref 0–45)
BASOPHILS # BLD AUTO: 0 10E9/L (ref 0–0.2)
BASOPHILS NFR BLD AUTO: 0.2 %
BILIRUB SERPL-MCNC: 0.5 MG/DL (ref 0.2–1.3)
BUN SERPL-MCNC: 20 MG/DL (ref 7–30)
CALCIUM SERPL-MCNC: 8.6 MG/DL (ref 8.5–10.1)
CHLORIDE SERPL-SCNC: 107 MMOL/L (ref 94–109)
CHOLEST SERPL-MCNC: 194 MG/DL
CO2 SERPL-SCNC: 28 MMOL/L (ref 20–32)
CREAT SERPL-MCNC: 0.77 MG/DL (ref 0.52–1.04)
DIFFERENTIAL METHOD BLD: NORMAL
EOSINOPHIL # BLD AUTO: 0.2 10E9/L (ref 0–0.7)
EOSINOPHIL NFR BLD AUTO: 4.1 %
ERYTHROCYTE [DISTWIDTH] IN BLOOD BY AUTOMATED COUNT: 12.9 % (ref 10–15)
GFR SERPL CREATININE-BSD FRML MDRD: 73 ML/MIN/1.7M2
GLUCOSE SERPL-MCNC: 101 MG/DL (ref 70–99)
HBA1C MFR BLD: 5.5 % (ref 0–5.6)
HCT VFR BLD AUTO: 43 % (ref 35–47)
HDLC SERPL-MCNC: 49 MG/DL
HGB BLD-MCNC: 14 G/DL (ref 11.7–15.7)
LDLC SERPL CALC-MCNC: 121 MG/DL
LYMPHOCYTES # BLD AUTO: 1.1 10E9/L (ref 0.8–5.3)
LYMPHOCYTES NFR BLD AUTO: 26.9 %
MCH RBC QN AUTO: 31.5 PG (ref 26.5–33)
MCHC RBC AUTO-ENTMCNC: 32.6 G/DL (ref 31.5–36.5)
MCV RBC AUTO: 97 FL (ref 78–100)
MONOCYTES # BLD AUTO: 0.4 10E9/L (ref 0–1.3)
MONOCYTES NFR BLD AUTO: 9.9 %
NEUTROPHILS # BLD AUTO: 2.4 10E9/L (ref 1.6–8.3)
NEUTROPHILS NFR BLD AUTO: 58.9 %
NONHDLC SERPL-MCNC: 145 MG/DL
PLATELET # BLD AUTO: 197 10E9/L (ref 150–450)
POTASSIUM SERPL-SCNC: 4.8 MMOL/L (ref 3.4–5.3)
PROT SERPL-MCNC: 6.9 G/DL (ref 6.8–8.8)
RBC # BLD AUTO: 4.45 10E12/L (ref 3.8–5.2)
SODIUM SERPL-SCNC: 141 MMOL/L (ref 133–144)
TRIGL SERPL-MCNC: 121 MG/DL
TSH SERPL DL<=0.005 MIU/L-ACNC: 1.25 MU/L (ref 0.4–4)
WBC # BLD AUTO: 4.1 10E9/L (ref 4–11)

## 2018-10-18 PROCEDURE — 85025 COMPLETE CBC W/AUTO DIFF WBC: CPT | Performed by: FAMILY MEDICINE

## 2018-10-18 PROCEDURE — 80053 COMPREHEN METABOLIC PANEL: CPT | Performed by: FAMILY MEDICINE

## 2018-10-18 PROCEDURE — 36415 COLL VENOUS BLD VENIPUNCTURE: CPT | Performed by: FAMILY MEDICINE

## 2018-10-18 PROCEDURE — 83036 HEMOGLOBIN GLYCOSYLATED A1C: CPT | Performed by: FAMILY MEDICINE

## 2018-10-18 PROCEDURE — 84443 ASSAY THYROID STIM HORMONE: CPT | Performed by: FAMILY MEDICINE

## 2018-10-18 PROCEDURE — 80061 LIPID PANEL: CPT | Performed by: FAMILY MEDICINE

## 2018-10-18 NOTE — LETTER
[facility]   [address] [place]  [address]  [phone]                                   October 22, 2018    Hemalatha Handy  [address]  [place] [address]        Dear Hemalatha,    The cholesterol is a little higher than last year, but still not real bad.    No medications needed for this.  Just keep working on healthy diet/exercise.     Other labs are fine.    Results for orders placed or performed in visit on 10/18/18   Lipid panel reflex to direct LDL Fasting   Result Value Ref Range    Cholesterol 194 <200 mg/dL    Triglycerides 121 <150 mg/dL    HDL Cholesterol 49 (L) >49 mg/dL    LDL Cholesterol Calculated 121 (H) <100 mg/dL    Non HDL Cholesterol 145 (H) <130 mg/dL   Comprehensive metabolic panel   Result Value Ref Range    Sodium 141 133 - 144 mmol/L    Potassium 4.8 3.4 - 5.3 mmol/L    Chloride 107 94 - 109 mmol/L    Carbon Dioxide 28 20 - 32 mmol/L    Anion Gap 6 3 - 14 mmol/L    Glucose 101 (H) 70 - 99 mg/dL    Urea Nitrogen 20 7 - 30 mg/dL    Creatinine 0.77 0.52 - 1.04 mg/dL    GFR Estimate 73 >60 mL/min/1.7m2    GFR Estimate If Black 88 >60 mL/min/1.7m2    Calcium 8.6 8.5 - 10.1 mg/dL    Bilirubin Total 0.5 0.2 - 1.3 mg/dL    Albumin 3.6 3.4 - 5.0 g/dL    Protein Total 6.9 6.8 - 8.8 g/dL    Alkaline Phosphatase 102 40 - 150 U/L    ALT 21 0 - 50 U/L    AST 18 0 - 45 U/L   CBC with platelets differential   Result Value Ref Range    WBC 4.1 4.0 - 11.0 10e9/L    RBC Count 4.45 3.8 - 5.2 10e12/L    Hemoglobin 14.0 11.7 - 15.7 g/dL    Hematocrit 43.0 35.0 - 47.0 %    MCV 97 78 - 100 fl    MCH 31.5 26.5 - 33.0 pg    MCHC 32.6 31.5 - 36.5 g/dL    RDW 12.9 10.0 - 15.0 %    Platelet Count 197 150 - 450 10e9/L    Diff Method Automated Method     % Neutrophils 58.9 %    % Lymphocytes 26.9 %    % Monocytes 9.9 %    % Eosinophils 4.1 %    % Basophils 0.2 %    Absolute Neutrophil 2.4 1.6 - 8.3 10e9/L    Absolute Lymphocytes 1.1 0.8 - 5.3 10e9/L     Absolute Monocytes 0.4 0.0 - 1.3 10e9/L    Absolute Eosinophils 0.2 0.0 - 0.7 10e9/L    Absolute Basophils 0.0 0.0 - 0.2 10e9/L   TSH with free T4 reflex   Result Value Ref Range    TSH 1.25 0.40 - 4.00 mU/L   Hemoglobin A1c   Result Value Ref Range    Hemoglobin A1C 5.5 0 - 5.6 %       If you have any questions please call the clinic at [phone]    Sincerely,    Javier Jones MD  [facility]

## 2018-10-21 NOTE — PROGRESS NOTES
The cholesterol is a little higher than last year, but still not real bad.    No medications needed for this.  Just keep working on healthy diet/exercise.     Other labs are fine.    Javier Jones MD

## 2018-11-25 DIAGNOSIS — F41.9 ANXIETY: ICD-10-CM

## 2018-11-26 RX ORDER — ALPRAZOLAM 0.25 MG
TABLET ORAL
Qty: 30 TABLET | Refills: 0 | Status: SHIPPED | OUTPATIENT
Start: 2018-11-26 | End: 2019-01-29

## 2018-11-26 NOTE — TELEPHONE ENCOUNTER
Requested Prescriptions   Pending Prescriptions Disp Refills     ALPRAZolam (XANAX) 0.25 MG tablet [Pharmacy Med Name: ALPRAZOLAM 0.25MG TABLETS] 30 tablet 0     Sig: TAKE 1 TABLET BY MOUTH THREE TIMES DAILY AS NEEDED FOR ANXIETY    There is no refill protocol information for this order         Last Written Prescription Date:  10/15/18  Last Fill Quantity: 30,   # refills: 0  Last Office Visit: 9/14/18  Future Office visit:       Routing refill request to provider for review/approval because:  Drug not on the G, P or [facility] refill protocol or controlled substance

## 2018-12-31 ENCOUNTER — TELEPHONE (OUTPATIENT)
Dept: FAMILY MEDICINE | Facility: CLINIC | Age: 78
End: 2018-12-31

## 2018-12-31 NOTE — TELEPHONE ENCOUNTER
Attempt # 1  Called patient at home number.[phone]   Was call answered?  Yes, has a sore throat, trouble swallow, unknown if has a fever does not feel feverish, no rash, cough for 3 days, throat felt scratchy then yesterday no better, today throat is worse, cough is productive of clear phlegm.  Nurse advised salt water gargle, rest and plenty of fluids.    Pharmacy cued.    Routing to PCP to review and advise.    Zandra Amador RN  [facility]

## 2018-12-31 NOTE — TELEPHONE ENCOUNTER
Reason for call:  Patient reporting a symptom    Symptom or request:  Possible strep throat    Duration (how long have symptoms been present): couple of days    Have you been treated for this before? No    Additional comments: Patient would like to get an order in for strep test.   Please call patient back to discuss and advise.    Phone Number patient can be reached at:  Home number on file [phone] (home)    Best Time:  anytime    Can we leave a detailed message on this number:  YES    Call taken on 12/31/2018 at 8:39 AM by Mili Watts

## 2019-01-29 DIAGNOSIS — F41.9 ANXIETY: ICD-10-CM

## 2019-01-29 NOTE — TELEPHONE ENCOUNTER
Requested Prescriptions   Pending Prescriptions Disp Refills     ALPRAZolam (XANAX) 0.25 MG tablet 30 tablet 0    There is no refill protocol information for this order         Last Written Prescription Date:  11-26-18  Last Fill Quantity: 30,   # refills: 0  Last Office Visit: 9-14-18  Future Office visit:       Routing refill request to provider for review/approval because:  Drug not on the [facility] or [facility] refill protocol or controlled substance

## 2019-01-30 RX ORDER — ALPRAZOLAM 0.25 MG
TABLET ORAL
Qty: 30 TABLET | Refills: 0 | Status: SHIPPED | OUTPATIENT
Start: 2019-01-30 | End: 2019-03-04

## 2019-05-07 ENCOUNTER — OFFICE VISIT (OUTPATIENT)
Dept: FAMILY MEDICINE | Facility: CLINIC | Age: 79
End: 2019-05-07
Payer: COMMERCIAL

## 2019-05-07 VITALS
HEART RATE: 97 BPM | SYSTOLIC BLOOD PRESSURE: 161 MMHG | HEIGHT: 62 IN | OXYGEN SATURATION: 95 % | DIASTOLIC BLOOD PRESSURE: 77 MMHG | BODY MASS INDEX: 27.79 KG/M2 | WEIGHT: 151 LBS

## 2019-05-07 DIAGNOSIS — R42 LIGHTHEADEDNESS: Primary | ICD-10-CM

## 2019-05-07 PROCEDURE — 99213 OFFICE O/P EST LOW 20 MIN: CPT | Performed by: FAMILY MEDICINE

## 2019-05-07 ASSESSMENT — MIFFLIN-ST. JEOR: SCORE: 1118.18

## 2019-05-07 NOTE — PROGRESS NOTES
SUBJECTIVE:   Hemalatha Handy is a 78 year old female who presents to clinic today for the following health issues:    Dizziness  Onset: Two days ago     Description:   Do you feel faint:  no  Does it feel like the surroundings (bed, room) are moving: no   Unsteady/off balance: YES  Have you passed out or fallen: no     Intensity: mild    Progression of Symptoms:  same    Accompanying Signs & Symptoms:  Heart palpitations: no   Nausea, vomiting: one episode when she felt like it on sunday  Weakness in arms or legs: no   Fatigue: YES  Vision or speech changes: no   Ringing in ears (Tinnitus): YES, always due to tinnitus in both ears. Left ear does feels funny  Hearing Loss: YES    History:   Head trauma/concussion hx: no   Previous similar symptoms: YES, one episode of vertigo  Recent bleeding history: no     Precipitating factors:   Worse with activity or head movement: YES, little bit  Any new medications (BP?): no   Alcohol/drug abuse/withdrawal: no     Alleviating factors:   Does staying in a fixed position give relief:  YES    Therapies Tried and outcome: Claritin yesterday morning help some.       Additional history: Patient had one episode of dizziness two nights ago.  States she woke up to use the bathroom and felt dizzy for a few seconds after she got out of bed.  Had to brace herself against the wall.  Symptoms went away very quickly and she felt normal afterwards.  No symptoms since then.  History of vertigo in the past, but this didn't feel nearly as bad as when she's had vertigo before.  She takes BP medications and notes that she forgot her dose that day.  She takes Xanax, but had not taken it that night.  Denies headache, vision changes, weakness.  Notes that she's felt a bit congested lately, so took some Claritin and feels it has helped.      Patient Active Problem List   Diagnosis     Anxiety     Advanced directives, counseling/discussion     GERD (gastroesophageal reflux disease)     Lateral  "meniscus tear, left, initial encounter     Popliteal cyst, left     Gastroesophageal reflux disease, esophagitis presence not specified     Essential hypertension with goal blood pressure less than 140/90     Past Surgical History:   Procedure Laterality Date     COLONOSCOPY  08     ENT SURGERY  12    Left lower lip lesion     HYSTERECTOMY, PAP NO LONGER INDICATED         Social History     Tobacco Use     Smoking status: Former Smoker     Last attempt to quit: 2005     Years since quittin.3     Smokeless tobacco: Never Used   Substance Use Topics     Alcohol use: Yes     Family History   Problem Relation Age of Onset     Hypertension Brother      Hypertension Mother      Cancer Son         hodgkins     C.A.D. No family hx of      Cerebrovascular Disease No family hx of            ROS:  Constitutional, HEENT, cardiovascular, pulmonary, GI, , musculoskeletal, neuro, skin, endocrine and psych systems are negative, except as otherwise noted.    OBJECTIVE:     /77 (BP Location: Right arm, Patient Position: Sitting, Cuff Size: Adult Regular)   Pulse 97   Ht 1.575 m (5' 2\")   Wt 68.5 kg (151 lb)   SpO2 95%   BMI 27.62 kg/m    Body mass index is 27.62 kg/m .  GENERAL: healthy, alert and no distress  EYES: Eyes grossly normal to inspection, PERRL and conjunctivae and sclerae normal  HENT: ear canals and TM's normal, nose and mouth without ulcers or lesions  NECK: no adenopathy, no asymmetry, masses, or scars and thyroid normal to palpation  RESP: lungs clear to auscultation - no rales, rhonchi or wheezes  CV: regular rate and rhythm, normal S1 S2, no S3 or S4, no murmur, click or rub, no peripheral edema and peripheral pulses strong  MS: no gross musculoskeletal defects noted, no edema  SKIN: no suspicious lesions or rashes  NEURO: Normal strength and tone, mentation intact and speech normal  PSYCH: mentation appears normal, affect normal/bright    ASSESSMENT/PLAN:     1. Lightheadedness  - " One self limited episode of lightheadedness two nights ago  - Could be a recurrence of vertigo vs labyrinthitis from sinus congestion  - Could also have been a high BP from missing her BP meds earlier that day  - Regardless of cause, symptoms have completely resolved and exam is normal today so no further workup needed  - Advised keeping well hydrated and make sure to take all medications daily as prescribed   - Ok to continue Claritin if she feels that was helpful     Patient has follow up with her PCP next month.  Recheck BP at that visit since she was running high today     Mary Engle,   [facility]

## 2019-05-07 NOTE — PATIENT INSTRUCTIONS
Patient Education     Dizziness (Uncertain Cause)  Dizziness is a common symptom. It may be described as lightheadedness, spinning, or feeling like you are going to faint. Dizziness can have many causes.  Be sure to tell the healthcare provider about:    All medicines you take, including prescription, over-the-counter, herbs, and supplements    Any other symptoms you have    Any health problems you are being treated for    Any past major health problems you've had, such as a heart attack, balance issues, hearing problems, or blood pressure problems    Anything that causes the dizziness to get worse or better  Today's exam did not show an exact cause for your dizziness. Other tests may be needed. Follow up with your healthcare provider.  Home care    Dizziness that occurs with sudden standing may be a sign of mild dehydration. Drink extra fluids for the next few days.    If you recently started a new medicine, stopped a medicine, or had the dose of a current medicine changed, talk with the prescribing healthcare provider. Your medicine plan may need adjustment.    If dizziness lasts more than a few seconds, sit or lie down until it passes. This may help prevent injury in case you pass out. Get up slowly when you feel better.    Don't drive or use power tools or dangerous equipment until you have had no dizziness for at least 48 hours.  Follow-up care  Follow up with your healthcare provider for further evaluation within the next 7 days or as advised.  When to seek medical advice  Call your healthcare provider for any of the following:    Worsening of symptoms or new symptoms    Passing out or seizure    Repeated vomiting    Headache    Palpitations (the sense that your heart is fluttering or beating fast or hard)    Shortness of breath    Blood in vomit or stool (black or red color)    Weakness of an arm or leg or 1 side of the face    Vision or hearing changes    Trouble walking or speaking    Chest, arm, neck,  back, or jaw pain  Date Last Reviewed: 11/1/2017 2000-2018 [employer]. [address] [place] [address], [place] [address]. All rights reserved. This information is not intended as a substitute for professional medical care. Always follow your healthcare professional's instructions.         [facility]     Discharged by : John Norton CMA on 5/7/2019 at 8:09 AM    Paper scripts provided to patient :      If you have any questions regarding your visit please contact your care team:     Team Sharita              Clinic Hours Telephone Number     Dr. Chidi Rehman, RENEE   7am-7pm  Monday - Thursday   7am-5pm  Fridays  [phone]   (Appointment scheduling available 24/7)     RN Line  [phone] option 2     Urgent Care - [place] - 11am-9pm Monday-Friday Saturday-Sunday- 9am-5pm     [place] -   5pm-9pm Monday-Friday Saturday-Sunday- 9am-5pm    [phone] - [place]    [phone] - [place]     For a Price Quote for your services, please call our Consumer Price Line at [phone].     What options do I have for visits at the clinic other than the traditional office visit?     To expand how we care for you, many of our providers are utilizing electronic visits (e-visits) and telephone visits, when medically appropriate, for interactions with their patients rather than a visit in the clinic. We also offer nurse visits for many medical concerns. Just like any other service, we will bill your insurance company for this type of visit based on time spent on the phone with your provider. Not all insurance companies cover these visits. Please check with your medical insurance if this type of visit is covered. You will be responsible for any charges that are not paid by your insurance.     E-visits via [employer]: generally incur a $45.00 fee.     Telephone visits:  Time spent on the phone: *charged based on time that  is spent on the phone in increments of 10 minutes. Estimated cost:   5-10 mins $30.00   11-20 mins. $59.00   21-30 mins. $85.00       Use [employer] (secure email communication and access to your chart) to send your primary care provider a message or make an appointment. Ask someone on your Team how to sign up for [employer].     As always, Thank you for trusting us with your health care needs!      [facility]:    Scheduling Appointments  [place] [place]  Call: [phone]    Mayur Pope, [facility]  Call: [phone]    [facility]  Call: [phone]    For Gastroenterology referrals   [facility]   Clinics and Surgery Center, 4th Floor   [address] [place] [address]   Appointments: [phone]    WHERE TO GO FOR CARE?    Clinic    Make an appointment if you:       Are sick (cold, cough, flu, sore throat, earache or in pain).       Have a small injury (sprain, small cut, burn or broken bone).       Need a physical exam, Pap smear, vaccine or prescription refill.       Have questions about your health or medicines.    To reach us:      Call [phone] ([phone]). Open 24 hours every day. (For counseling services, call [phone].)    Log into [employer] at [email or web address]. (Visit [email or web address] to create an account.) Hospital emergency room    An emergency is a serious or life- threatening problem that must be treated right away.    Call [phone] or get to the hospital if you have:      Very bad or sudden:            - Chest pain or pressure         - Bleeding         - Head or belly pain         - Dizziness or trouble seeing, walking or                          Speaking      Problems breathing      Blood in your vomit or you are coughing up blood      A major injury (knocked out, loss of a finger or limb, rape, broken bone protruding from skin)    A mental health crisis. (Or call the Mental Health  Crisis line at 1-426.978.7451 or Suicide Prevention Hotline at 1-763.766.2293.)    Open 24 hours every day. You don't need an appointment.     Urgent care    Visit urgent care for sickness or small injuries when the clinic is closed. You don't need an appointment. To check hours or find an urgent care near you, visit [email or web address]. Online care    Get online care from [facility] for more than 70 common problems, like colds, allergies and infections. Open 24 hours every day at:   [email or web address]   Need help deciding?    For advice about where to be seen, you may call your clinic and ask to speak with a nurse. We're here for you 24 hours every day.         If you are deaf or hard of hearing, please let us know. We provide many free services including sign language interpreters, oral interpreters, TTYs, telephone amplifiers, note takers and written materials.

## 2019-05-10 ENCOUNTER — OFFICE VISIT (OUTPATIENT)
Dept: FAMILY MEDICINE | Facility: CLINIC | Age: 79
End: 2019-05-10
Payer: COMMERCIAL

## 2019-05-10 VITALS
HEART RATE: 77 BPM | DIASTOLIC BLOOD PRESSURE: 76 MMHG | BODY MASS INDEX: 27.53 KG/M2 | TEMPERATURE: 97.5 F | WEIGHT: 150.5 LBS | SYSTOLIC BLOOD PRESSURE: 130 MMHG

## 2019-05-10 DIAGNOSIS — F41.9 ANXIETY: ICD-10-CM

## 2019-05-10 DIAGNOSIS — I10 ESSENTIAL HYPERTENSION WITH GOAL BLOOD PRESSURE LESS THAN 140/90: ICD-10-CM

## 2019-05-10 DIAGNOSIS — K21.9 GASTROESOPHAGEAL REFLUX DISEASE, ESOPHAGITIS PRESENCE NOT SPECIFIED: ICD-10-CM

## 2019-05-10 DIAGNOSIS — H81.11 BENIGN PAROXYSMAL POSITIONAL VERTIGO OF RIGHT EAR: Primary | ICD-10-CM

## 2019-05-10 PROCEDURE — 99214 OFFICE O/P EST MOD 30 MIN: CPT | Performed by: FAMILY MEDICINE

## 2019-05-10 RX ORDER — ALPRAZOLAM 0.25 MG
TABLET ORAL
Qty: 30 TABLET | Refills: 0 | Status: SHIPPED | OUTPATIENT
Start: 2019-05-10 | End: 2019-06-20

## 2019-05-10 ASSESSMENT — PAIN SCALES - GENERAL: PAINLEVEL: NO PAIN (0)

## 2019-05-10 NOTE — PATIENT INSTRUCTIONS
Call if symptoms not resolving    Could see physical therapy    Stay well hydrated    Stay on same medications

## 2019-05-10 NOTE — PROGRESS NOTES
SUBJECTIVE:   Hemalatha Handy is a 78 year old female who presents to clinic today for the following   health issues:       Was seen by Mary Engle on 05/07/2019 for lightheadedness and states she is still having the issues and wants a 2nd opinion    none    Additional history: as documented    Reviewed  and updated as needed this visit by clinical staff  Allergies  Meds         Reviewed and updated as needed this visit by Provider              Staying about the same    Lay down, spinning    Some hearing loss    Always some ringing    No fever/ chillls    No cough/ congestion    Tingly feeling maybe a bit of pressure in face around eyes around head    Loratadine did not help    Some clear nasal drainage occasionally, not new     No post nasal drainage    Vision okay    Occasional headache     About 3 years ago    Almost    Not much gerd recently    Not needing zantac much    Physical Exam   Constitutional: She is oriented to person, place, and time. She appears well-developed and well-nourished.   HENT:   Head: Normocephalic and atraumatic.   Right Ear: Tympanic membrane, external ear and ear canal normal.   Left Ear: Tympanic membrane, external ear and ear canal normal.   Nose: Nose normal.   Mouth/Throat: Oropharynx is clear and moist.   Eyes: Conjunctivae are normal.   Neck: Carotid bruit is not present.   Cardiovascular: Normal rate, regular rhythm and normal heart sounds.   Pulmonary/Chest: Effort normal and breath sounds normal. No respiratory distress.   Musculoskeletal: She exhibits no edema.   Neurological: She is alert and oriented to person, place, and time.   Sensation/strength in extremities normal.  Romberg, finger nose test, standing on one leg test, heel toe walk all normal.  Cranial nerves normal.  Provocative test for bppv pos when turning head to right not to left        ASSESSMENT / PLAN:  (H81.11) Benign paroxysmal positional vertigo of right ear  (primary encounter diagnosis)  Comment:  discussed in detail.  Patient had this once before and saw phys therapy which helped.  However she declines seeing them at this time.   Plan: call if symptoms worsen/ don't resolve.  Be seen promptly if symptoms acutely worsen.     (F41.9) Anxiety  Comment: uses about one at night.  Fairly chronic med for patient.  Works well.  No side effects.  Plan: ALPRAZolam (XANAX) 0.25 MG tablet             (I10) Essential hypertension with goal blood pressure less than 140/90  Comment: at goal   Plan: no change     (K21.9) Gastroesophageal reflux disease, esophagitis presence not specified  Comment: only occasionally needs h2 blocker   Plan: as above       I reviewed the patient's medications, allergies, medical history, family history, and social history.    Javier Jones MD

## 2019-06-19 DIAGNOSIS — F41.9 ANXIETY: ICD-10-CM

## 2019-06-19 DIAGNOSIS — I10 HYPERTENSION GOAL BP (BLOOD PRESSURE) < 140/90: ICD-10-CM

## 2019-06-19 NOTE — TELEPHONE ENCOUNTER
New pharmacy      Requested Prescriptions   Pending Prescriptions Disp Refills     citalopram (CELEXA) 20 MG tablet 90 tablet 3     Sig: Take 1 tablet (20 mg) by mouth daily   Last Written Prescription Date:  9-14-18  Last Fill Quantity: 90,  # refills: 3   Last office visit: 5/10/2019 with prescribing provider:     Future Office Visit:        There is no refill protocol information for this order        lisinopril (PRINIVIL/ZESTRIL) 10 MG tablet 90 tablet 3     Sig: Take 1 tablet (10 mg) by mouth daily       There is no refill protocol information for this order   Last Written Prescription Date:  9-14-18  Last Fill Quantity: 90,  # refills: 3   Last office visit: 5/10/2019 with prescribing provider:     Future Office Visit:

## 2019-06-20 DIAGNOSIS — F41.9 ANXIETY: ICD-10-CM

## 2019-06-21 RX ORDER — ALPRAZOLAM 0.25 MG
TABLET ORAL
Qty: 30 TABLET | Refills: 0 | Status: SHIPPED | OUTPATIENT
Start: 2019-06-21 | End: 2019-07-23

## 2019-06-21 RX ORDER — CITALOPRAM HYDROBROMIDE 20 MG/1
20 TABLET ORAL DAILY
Qty: 90 TABLET | Refills: 3 | Status: SHIPPED | OUTPATIENT
Start: 2019-06-21 | End: 2020-06-25

## 2019-06-21 RX ORDER — LISINOPRIL 10 MG/1
10 TABLET ORAL DAILY
Qty: 90 TABLET | Refills: 3 | Status: SHIPPED | OUTPATIENT
Start: 2019-06-21 | End: 2020-06-25

## 2019-06-21 NOTE — TELEPHONE ENCOUNTER
ALPRAZolam (XANAX) 0.25 MG tablet      Last Written Prescription Date:  5/10/19  Last Fill Quantity: 30,   # refills: 0  Last Office Visit: [place]  Future Office visit:       Routing refill request to provider for review/approval because:  Drug not on the [facility], [facility] or [facility] refill protocol or controlled substance

## 2019-06-21 NOTE — TELEPHONE ENCOUNTER
Prescription approved per [facility] Refill Protocol.]  Ernestina Garcias RN

## 2019-08-14 ENCOUNTER — OFFICE VISIT (OUTPATIENT)
Dept: URGENT CARE | Facility: URGENT CARE | Age: 79
End: 2019-08-14
Payer: COMMERCIAL

## 2019-08-14 VITALS
BODY MASS INDEX: 28.53 KG/M2 | HEART RATE: 106 BPM | TEMPERATURE: 97.9 F | OXYGEN SATURATION: 99 % | DIASTOLIC BLOOD PRESSURE: 74 MMHG | RESPIRATION RATE: 18 BRPM | SYSTOLIC BLOOD PRESSURE: 158 MMHG | WEIGHT: 156 LBS

## 2019-08-14 DIAGNOSIS — L71.0 PERIORAL DERMATITIS: Primary | ICD-10-CM

## 2019-08-14 PROCEDURE — 99213 OFFICE O/P EST LOW 20 MIN: CPT | Performed by: FAMILY MEDICINE

## 2019-08-14 ASSESSMENT — ENCOUNTER SYMPTOMS
SPEECH DIFFICULTY: 0
RHINORRHEA: 0
JOINT SWELLING: 0
DIZZINESS: 0
SHORTNESS OF BREATH: 0
ABDOMINAL PAIN: 0
TREMORS: 0
PALPITATIONS: 0
SEIZURES: 0
FACIAL ASYMMETRY: 0
SORE THROAT: 0
NUMBNESS: 0
COUGH: 0
BACK PAIN: 0
DYSURIA: 0
FEVER: 0
VOMITING: 0
BRUISES/BLEEDS EASILY: 0
CHILLS: 0
NAUSEA: 0
DIARRHEA: 0
HEADACHES: 0
WEAKNESS: 0
WOUND: 0

## 2019-08-14 NOTE — PROGRESS NOTES
SUBJECTIVE:   Hemalatha Handy is a 79 year old female presenting with a chief complaint of   Chief Complaint   Patient presents with     Mouth/Lip Problem     RED AND SWOLLEN WITH NUMBNESS       She is an established patient of [facility].    One month of upper lip redness and does not seem to be changing. Also noted numbness along the nasal area.     Denies injury to the area.   She did see her dentist 6 months ago does not wear any appliances.   Does have maxillary dental tenderness of upper central and lateral incisors x 4 teeth. Crown placed on these teeth 4 years.       Denies sinus sinus sx.     Review of Systems   Constitutional: Negative for chills and fever.   HENT: Negative for congestion, ear pain, rhinorrhea and sore throat.    Eyes: Negative for visual disturbance.   Respiratory: Negative for cough and shortness of breath.    Cardiovascular: Negative for chest pain and palpitations.   Gastrointestinal: Negative for abdominal pain, diarrhea, nausea and vomiting.   Genitourinary: Negative for dysuria, menstrual problem, vaginal bleeding, vaginal discharge and vaginal pain.   Musculoskeletal: Negative for back pain and joint swelling.   Skin: Positive for rash (per hpi ). Negative for wound.   Allergic/Immunologic: Negative for environmental allergies and food allergies.   Neurological: Negative for dizziness, tremors, seizures, syncope, facial asymmetry, speech difficulty, weakness, numbness and headaches.   Hematological: Does not bruise/bleed easily.     Patient Active Problem List   Diagnosis     Anxiety     Advanced directives, counseling/discussion     GERD (gastroesophageal reflux disease)     Lateral meniscus tear, left, initial encounter     Popliteal cyst, left     Gastroesophageal reflux disease, esophagitis presence not specified     Essential hypertension with goal blood pressure less than 140/90      Past Medical History:   Diagnosis Date     Actinic keratosis      Basal cell carcinoma pt  unsure    abdomen, and elsewhere     Cervical cancer (H) early 1960's    stage 3 txd      Colorectal polyps     1 Polyp     Hypertension      Family History   Problem Relation Age of Onset     Hypertension Brother      Hypertension Mother      Cancer Son         hodgkins     C.A.D. No family hx of      Cerebrovascular Disease No family hx of      Current Outpatient Medications   Medication Sig Dispense Refill     ALPRAZolam (XANAX) 0.25 MG tablet TAKE 1 TABLET BY MOUTH THREE TIMES DAILY AS NEEDED FOR ANXIETY 30 tablet 0     citalopram (CELEXA) 20 MG tablet Take 1 tablet (20 mg) by mouth daily 90 tablet 3     lisinopril (PRINIVIL/ZESTRIL) 10 MG tablet Take 1 tablet (10 mg) by mouth daily 90 tablet 3     MOTRIN 800 MG OR TABS 1 TABLET 3 TIMES DAILY AS NEEDED       ranitidine (ZANTAC) 150 MG tablet TAKE 1 TABLET (150 MG) BY MOUTH 2 TIMES DAILY 180 tablet 3     Social History     Tobacco Use     Smoking status: Former Smoker     Last attempt to quit: 2005     Years since quittin.6     Smokeless tobacco: Never Used   Substance Use Topics     Alcohol use: Yes       OBJECTIVE  BP (!) 158/74 (BP Location: Left arm, Patient Position: Chair, Cuff Size: Adult Regular)   Pulse 106   Temp 97.9  F (36.6  C) (Oral)   Resp 18   Wt 70.8 kg (156 lb)   SpO2 99%   BMI 28.53 kg/m      Physical Exam   Constitutional: She is oriented to person, place, and time. She appears well-developed.   HENT:   Head: Normocephalic and atraumatic.   Right Ear: External ear normal.   Left Ear: External ear normal.   Nose: Nose normal.   Mouth/Throat: Uvula is midline, oropharynx is clear and moist and mucous membranes are normal. No oral lesions. No trismus in the jaw. No dental abscesses, uvula swelling or lacerations. No oropharyngeal exudate. Tonsils are 2+ on the right. Tonsils are 2+ on the left. No tonsillar exudate.   Noted mild erythema or almost imperceptible upper lip redness without any notable swelling.     Noted mild  numbness at left central incisor and upper lips bilaterally at nares.        Eyes: Pupils are equal, round, and reactive to light. Conjunctivae are normal. Right eye exhibits no discharge. Left eye exhibits no discharge. No scleral icterus.   Neck: Neck supple. No tracheal deviation present. No thyromegaly present.   Cardiovascular: Normal rate, regular rhythm, normal heart sounds and intact distal pulses. Exam reveals no gallop and no friction rub.   No murmur heard.  Pulmonary/Chest: Effort normal and breath sounds normal. No stridor. No respiratory distress. She has no wheezes. She has no rales. She exhibits no tenderness.   Abdominal: Soft. Bowel sounds are normal. She exhibits no distension and no mass. There is no tenderness. There is no rebound and no guarding.   Musculoskeletal: She exhibits no edema, tenderness or deformity.   Lymphadenopathy:     She has no cervical adenopathy.   Neurological: She is alert and oriented to person, place, and time. No cranial nerve deficit.   Skin: Skin is warm and dry. Capillary refill takes less than 2 seconds. No rash noted. No erythema.   Psychiatric: She has a normal mood and affect. Judgment normal.         ASSESSMENT:    ICD-10-CM    1. Perioral dermatitis L71.0       PLAN:  She will see her primary doctor within the next 4 weeks for her annual exam   Advised her to stop all topical products and to avoid licking or touching lips which she appears to do often  Trial of OTC 1% hydrocortisone as a trial only..   She will follow-up with her dentist. Exam reassuring.   The patient indicates understanding of these issues and agrees with the plan.   Patient educational/instructional material provided including reasons for follow-up   Burak Lamar MD

## 2019-08-16 ENCOUNTER — OFFICE VISIT (OUTPATIENT)
Dept: URGENT CARE | Facility: URGENT CARE | Age: 79
End: 2019-08-16
Payer: COMMERCIAL

## 2019-08-16 VITALS
TEMPERATURE: 98.5 F | HEART RATE: 74 BPM | DIASTOLIC BLOOD PRESSURE: 77 MMHG | BODY MASS INDEX: 28.53 KG/M2 | WEIGHT: 156 LBS | OXYGEN SATURATION: 96 % | SYSTOLIC BLOOD PRESSURE: 147 MMHG

## 2019-08-16 DIAGNOSIS — K05.10 GINGIVITIS: Primary | ICD-10-CM

## 2019-08-16 DIAGNOSIS — L30.9 FACIAL DERMATITIS: ICD-10-CM

## 2019-08-16 PROCEDURE — 99213 OFFICE O/P EST LOW 20 MIN: CPT | Performed by: NURSE PRACTITIONER

## 2019-08-16 RX ORDER — CHLORHEXIDINE GLUCONATE ORAL RINSE 1.2 MG/ML
15 SOLUTION DENTAL 2 TIMES DAILY
Qty: 473 ML | Refills: 0 | Status: SHIPPED | OUTPATIENT
Start: 2019-08-16 | End: 2020-06-30

## 2019-08-16 RX ORDER — PENICILLIN V POTASSIUM 500 MG/1
500 TABLET, FILM COATED ORAL 2 TIMES DAILY
Qty: 14 TABLET | Refills: 0 | Status: SHIPPED | OUTPATIENT
Start: 2019-08-16 | End: 2019-08-23

## 2019-08-16 ASSESSMENT — ENCOUNTER SYMPTOMS
NEUROLOGICAL NEGATIVE: 1
RESPIRATORY NEGATIVE: 1
ROS SKIN COMMENTS: UPPER LIP
CONSTITUTIONAL NEGATIVE: 1
CARDIOVASCULAR NEGATIVE: 1

## 2019-08-16 NOTE — PATIENT INSTRUCTIONS
Patient Education     What is Atopic Dermatitis?  Atopic dermatitis (also called eczema) causes chronic skin irritation. It is often found in infants, teens, and adults. This disease often runs in families (is genetic). It may also be linked to allergies, such as hay fever and sometimes asthma. Patches of skin become dry, red, itchy, and scaly. In older adults, abnormally dry skin is often called xerosis. Sometimes eczema is only on the hands or feet. It often improves when the skin is well hydrated. It gets worse when the skin is dry. You can help control symptoms by practicing good self-care. Avoid anything that causes flare-ups (such as sunburn or vigorous scratching).  Where do you have symptoms?  Atopic dermatitis symptoms can appear anywhere on the body. But in most cases they vary based on the person s age. In infants, irritation is often seen on the cheeks, chin, near the mouth, and under the eyelids. In children ages 2 through 10, skin folds, such as the backs of the knees, or in the arm crease, are most often affected. In children 11 and older and in adults, symptoms can affect many areas.  What triggers symptoms?  Symptoms flare because of many things. These include skin dryness, scratching, stress, harsh soaps, and irritants such as dust or wool. Try to avoid anything that causes flare-ups.  Recognizing what causes flare-ups  To figure out what causes atopic dermatitis to flare, keep a list of things that seem to affect your skin. Start by filling in the spaces below. Then keep writing them down in a notebook or diary. The things that affect each person vary. So keep your own list and try to avoid your triggers.     ________________________________________________     ________________________________________________     ________________________________________________  Date Last Reviewed: 2/1/2017 2000-2018 [employer]. [address] [place] [address], [place] [address]. All rights reserved.  This information is not intended as a substitute for professional medical care. Always follow your healthcare professional's instructions.           Patient Education     Stages of Periodontal Disease  Periodontal disease is an infection of the gums and tissues supporting the teeth. If not treated, it often gets worse. Bone damage and tooth loss can occur. Regular self-care and dental visits can help prevent or control periodontal disease.     Gingivitis       Periodontitis       Advanced periodontitis      Gingivitis  This is the mildest form of periodontal disease. The gum becomes irritated and swollen (inflamed). The space between the gum and tooth gets deeper, forming a pocket. Gums may become red and may bleed. Or there may be no symptoms. Left untreated, it can progress to periodontitis.  Periodontitis  Infection and inflammation spread to the bone supporting the teeth. Gums may shrink back (recede) from the teeth. Pockets between the teeth can get deeper and harder to clean. Redness, swelling, and bleeding may develop or get worse. Infection begins to destroy the bone. As bone is destroyed, teeth may start to feel loose.  Advanced periodontitis  As periodontitis advances, pockets deepen even more and can fill with pus. Around the roots of the teeth, the gums may start to swell. Bone loss continues. The teeth may feel sensitive to heat or cold and may hurt when brushed. Teeth loosen more. In some cases, teeth may need to be removed to keep the disease from spreading.  Date Last Reviewed: 7/1/2017 2000-2018 [employer]. [address] [place] [address]. All rights reserved. This information is not intended as a substitute for professional medical care. Always follow your healthcare professional's instructions.

## 2019-08-16 NOTE — PROGRESS NOTES
YARI Penny is a 79-year-old female who presents with gingival pain and swelling.  She was evaluated approximately 4 days ago for a dermatitis rash on the upper lip.  She reports that that rash is still there and has not significantly improved with twice daily over-the-counter hydrocortisone cream.  The past 24 hours she has noted some gingival irritation in particular in the gingival sulcus superior to the left upper incisor. She is gargling Listerine without relief.    Past Medical History:   Diagnosis Date     Actinic keratosis      Basal cell carcinoma pt unsure    abdomen, and elsewhere     Cervical cancer (H) early 1960's    stage 3 txd      Colorectal polyps 2008    1 Polyp     Hypertension       Patient Active Problem List   Diagnosis     Anxiety     Advanced directives, counseling/discussion     GERD (gastroesophageal reflux disease)     Lateral meniscus tear, left, initial encounter     Popliteal cyst, left     Gastroesophageal reflux disease, esophagitis presence not specified     Essential hypertension with goal blood pressure less than 140/90      Past Surgical History:   Procedure Laterality Date     COLONOSCOPY  9-18-08     ENT SURGERY  7-12-12    Left lower lip lesion     HYSTERECTOMY, PAP NO LONGER INDICATED        Allergies   Allergen Reactions     Alcohol Hives     Asa [Aspirin]      Evista [Raloxifene Hydrochloride]      Prempro      Raloxifene Hives and Rash     Current Outpatient Medications   Medication     ALPRAZolam (XANAX) 0.25 MG tablet     citalopram (CELEXA) 20 MG tablet     lisinopril (PRINIVIL/ZESTRIL) 10 MG tablet     MOTRIN 800 MG OR TABS     ranitidine (ZANTAC) 150 MG tablet     No current facility-administered medications for this visit.          Review of Systems   Constitutional: Negative.    HENT:        Gingival soreness and swelling.    Respiratory: Negative.    Cardiovascular: Negative.    Skin: Positive for rash.        Upper lip   Neurological: Negative.          Physical  Exam   Constitutional: She appears well-developed and well-nourished. No distress.   BP (!) 147/77   Pulse 74   Temp 98.5  F (36.9  C) (Oral)   Wt 70.8 kg (156 lb)   SpO2 96%   Breastfeeding? No   BMI 28.53 kg/m       HENT:   Head: Normocephalic.   Mouth/Throat: Oropharynx is clear and moist. No oral lesions. No dental abscesses.       Mild gingival erythema superior to the left upper central incisor. No swelling or fluctuance. Negative for pain with percussion.    Cardiovascular: Normal rate.   Pulmonary/Chest: Effort normal.   Skin: Rash noted.   Nursing note and vitals reviewed.        Assessment:  1. Gingivitis    2. Facial dermatitis        Plan:  Orders Placed This Encounter     chlorhexidine (PERIDEX) 0.12 % solution twice daily     penicillin V (VEETID) 500 MG tablet twice daily for 7 days   Tylenol 1-2 tabs po q4h prn pain  Continue hydrocortisone to facial rash  Instructions regarding self-care of patient/child reviewed.   Written instructions provided in after visit summary and reviewed.  Patient instructed to see primary care provider for new or persistent symptoms.   Red flag symptoms reviewed and patient has been instructed to seek emergent care  Please contact pharmacy for medication questions.  Patient instructed to take medications as directed on package.      Maria Ines Marte, DNP, APRN, CNP

## 2019-09-19 ENCOUNTER — TELEPHONE (OUTPATIENT)
Dept: FAMILY MEDICINE | Facility: CLINIC | Age: 79
End: 2019-09-19

## 2019-09-19 NOTE — LETTER
October 14, 2019    Hemalatha Handy  [address]  [facility] [address]    Dear Hemalatha    We care about your health and have reviewed your health plan. We have reviewed your medical conditions, medication list, and lab results and are making recommendations based on this review, to better manage your health.    You are in particular need of attention regarding:  - Your High Blood Pressure, Please call to schedule an appointment with your provider or nurse      Here is a list of Health Maintenance topics that are due now or due soon:  Health Maintenance Due   Topic Date Due     MEDICARE ANNUAL WELLNESS VISIT  05/14/2015     ZOSTER IMMUNIZATION (2 of 3) 05/13/2016     DEXA  05/08/2017     PHQ-2  01/01/2019     ADVANCE CARE PLANNING  06/12/2019     INFLUENZA VACCINE (1) 09/01/2019     FALL RISK ASSESSMENT  09/14/2019     BMP  10/18/2019     LIPID  10/18/2019       Please call us at [phone] (or use [employer]) to address the above recommendations. If we do not hear from you in the next couple of weeks we will be reaching out to you again.    Thank you for trusting [facility] and we appreciate the opportunity to serve you.  We look forward to supporting your healthcare needs in the future.    Healthy Regards,    Your care team

## 2019-09-19 NOTE — TELEPHONE ENCOUNTER
Panel Management Review      Patient has the following on her problem list:     Hypertension   Last three blood pressure readings:  BP Readings from Last 3 Encounters:   08/16/19 (!) 147/77   08/14/19 (!) 158/74   05/10/19 130/76     Blood pressure: FAILED    HTN Guidelines:  Less than 140/90      Composite cancer screening  Chart review shows that this patient is due/due soon for the following None  Summary:    Patient is due/failing the following:   BP CHECK    Action needed:   Patient needs office visit for blood pressure check up.    Type of outreach:    Sent letter. 09/19/2019    Questions for provider review:    None                                                                                                                                    Stephanie Mcfarland [facility]       Chart routed to Care Team .

## 2019-09-19 NOTE — LETTER
September 19, 2019    Hemalatha Handy  [address]  [facility] [address]    Dear Hemalatha    We care about your health and have reviewed your health plan. We have reviewed your medical conditions, medication list, and lab results and are making recommendations based on this review, to better manage your health.    You are in particular need of attention regarding:  - Your High Blood Pressure, Please call to schedule an appointment with your provider or nurse      Here is a list of Health Maintenance topics that are due now or due soon:  Health Maintenance Due   Topic Date Due     MEDICARE ANNUAL WELLNESS VISIT  05/14/2015     ZOSTER IMMUNIZATION (2 of 3) 05/13/2016     DEXA  05/08/2017     PHQ-2  01/01/2019     ADVANCE CARE PLANNING  06/12/2019     INFLUENZA VACCINE (1) 09/01/2019     FALL RISK ASSESSMENT  09/14/2019     BMP  10/18/2019     LIPID  10/18/2019       Please call us at [phone] (or use "[employer] to address the above recommendations. If we do not hear from you in the next couple of weeks we will be reaching out to you again.    Thank you for trusting [facility] and we appreciate the opportunity to serve you.  We look forward to supporting your healthcare needs in the future.    Healthy Regards,    Your care team

## 2019-10-02 NOTE — TELEPHONE ENCOUNTER
Panel Management Review      Patient has the following on her problem list:       Composite cancer screening  Chart review shows that this patient is due/due soon for the following   Summary:    Patient is due/failing the following:       Action needed:       Type of outreach:    Phone, left message for patient to call back.  10/02/2019    Questions for provider review:    None                                                                                                                                    Stephanie Mcfarland [facility]       Chart routed to Care Team .

## 2019-10-14 NOTE — TELEPHONE ENCOUNTER
Panel Management Review  Summary:    Type of outreach:    Sent letter. 10/14/2019    Encounter routed to Care Team.                                                                                                                               Stephanie Mcfarland CMA

## 2019-10-22 NOTE — TELEPHONE ENCOUNTER
Panel Management Review  Summary:    Type of outreach:    patient has upcoming appointment    Encounter routed to No Action Needed.                                                                                                                               Stephanie Mcfarland CMA

## 2019-10-23 ENCOUNTER — OFFICE VISIT (OUTPATIENT)
Dept: FAMILY MEDICINE | Facility: CLINIC | Age: 79
End: 2019-10-23
Payer: COMMERCIAL

## 2019-10-23 VITALS
DIASTOLIC BLOOD PRESSURE: 82 MMHG | SYSTOLIC BLOOD PRESSURE: 133 MMHG | BODY MASS INDEX: 28.53 KG/M2 | WEIGHT: 156 LBS | HEART RATE: 92 BPM | TEMPERATURE: 97.6 F

## 2019-10-23 DIAGNOSIS — K21.9 GASTROESOPHAGEAL REFLUX DISEASE, ESOPHAGITIS PRESENCE NOT SPECIFIED: ICD-10-CM

## 2019-10-23 DIAGNOSIS — I10 ESSENTIAL HYPERTENSION WITH GOAL BLOOD PRESSURE LESS THAN 140/90: Primary | ICD-10-CM

## 2019-10-23 DIAGNOSIS — Z23 NEED FOR PROPHYLACTIC VACCINATION AND INOCULATION AGAINST INFLUENZA: ICD-10-CM

## 2019-10-23 DIAGNOSIS — M54.9 MID BACK PAIN: ICD-10-CM

## 2019-10-23 DIAGNOSIS — Z00.00 HEALTH CARE MAINTENANCE: ICD-10-CM

## 2019-10-23 DIAGNOSIS — E78.5 HYPERLIPIDEMIA LDL GOAL <100: ICD-10-CM

## 2019-10-23 DIAGNOSIS — R73.01 IMPAIRED FASTING GLUCOSE: ICD-10-CM

## 2019-10-23 DIAGNOSIS — R53.83 FATIGUE, UNSPECIFIED TYPE: ICD-10-CM

## 2019-10-23 LAB
ALBUMIN SERPL-MCNC: 4 G/DL (ref 3.4–5)
ALP SERPL-CCNC: 96 U/L (ref 40–150)
ALT SERPL W P-5'-P-CCNC: 24 U/L (ref 0–50)
ANION GAP SERPL CALCULATED.3IONS-SCNC: 4 MMOL/L (ref 3–14)
AST SERPL W P-5'-P-CCNC: 22 U/L (ref 0–45)
BASOPHILS # BLD AUTO: 0 10E9/L (ref 0–0.2)
BASOPHILS NFR BLD AUTO: 0.2 %
BILIRUB SERPL-MCNC: 0.4 MG/DL (ref 0.2–1.3)
BUN SERPL-MCNC: 17 MG/DL (ref 7–30)
CALCIUM SERPL-MCNC: 9.1 MG/DL (ref 8.5–10.1)
CHLORIDE SERPL-SCNC: 106 MMOL/L (ref 94–109)
CHOLEST SERPL-MCNC: 194 MG/DL
CO2 SERPL-SCNC: 31 MMOL/L (ref 20–32)
CREAT SERPL-MCNC: 0.66 MG/DL (ref 0.52–1.04)
DIFFERENTIAL METHOD BLD: NORMAL
EOSINOPHIL # BLD AUTO: 0.1 10E9/L (ref 0–0.7)
EOSINOPHIL NFR BLD AUTO: 3 %
ERYTHROCYTE [DISTWIDTH] IN BLOOD BY AUTOMATED COUNT: 12.9 % (ref 10–15)
GFR SERPL CREATININE-BSD FRML MDRD: 84 ML/MIN/{1.73_M2}
GLUCOSE SERPL-MCNC: 101 MG/DL (ref 70–99)
HBA1C MFR BLD: 5.4 % (ref 0–5.6)
HCT VFR BLD AUTO: 44.2 % (ref 35–47)
HDLC SERPL-MCNC: 56 MG/DL
HGB BLD-MCNC: 14.2 G/DL (ref 11.7–15.7)
LDLC SERPL CALC-MCNC: 113 MG/DL
LYMPHOCYTES # BLD AUTO: 1.2 10E9/L (ref 0.8–5.3)
LYMPHOCYTES NFR BLD AUTO: 26.1 %
MCH RBC QN AUTO: 31.3 PG (ref 26.5–33)
MCHC RBC AUTO-ENTMCNC: 32.1 G/DL (ref 31.5–36.5)
MCV RBC AUTO: 97 FL (ref 78–100)
MONOCYTES # BLD AUTO: 0.5 10E9/L (ref 0–1.3)
MONOCYTES NFR BLD AUTO: 11.3 %
NEUTROPHILS # BLD AUTO: 2.8 10E9/L (ref 1.6–8.3)
NEUTROPHILS NFR BLD AUTO: 59.4 %
NONHDLC SERPL-MCNC: 138 MG/DL
PLATELET # BLD AUTO: 206 10E9/L (ref 150–450)
POTASSIUM SERPL-SCNC: 4.5 MMOL/L (ref 3.4–5.3)
PROT SERPL-MCNC: 7.3 G/DL (ref 6.8–8.8)
RBC # BLD AUTO: 4.54 10E12/L (ref 3.8–5.2)
SODIUM SERPL-SCNC: 141 MMOL/L (ref 133–144)
TRIGL SERPL-MCNC: 125 MG/DL
TSH SERPL DL<=0.005 MIU/L-ACNC: 2.18 MU/L (ref 0.4–4)
WBC # BLD AUTO: 4.7 10E9/L (ref 4–11)

## 2019-10-23 PROCEDURE — 36415 COLL VENOUS BLD VENIPUNCTURE: CPT | Performed by: FAMILY MEDICINE

## 2019-10-23 PROCEDURE — 83036 HEMOGLOBIN GLYCOSYLATED A1C: CPT | Performed by: FAMILY MEDICINE

## 2019-10-23 PROCEDURE — 84443 ASSAY THYROID STIM HORMONE: CPT | Performed by: FAMILY MEDICINE

## 2019-10-23 PROCEDURE — 90662 IIV NO PRSV INCREASED AG IM: CPT | Performed by: FAMILY MEDICINE

## 2019-10-23 PROCEDURE — 80053 COMPREHEN METABOLIC PANEL: CPT | Performed by: FAMILY MEDICINE

## 2019-10-23 PROCEDURE — 80061 LIPID PANEL: CPT | Performed by: FAMILY MEDICINE

## 2019-10-23 PROCEDURE — G0008 ADMIN INFLUENZA VIRUS VAC: HCPCS | Performed by: FAMILY MEDICINE

## 2019-10-23 PROCEDURE — 99214 OFFICE O/P EST MOD 30 MIN: CPT | Mod: 25 | Performed by: FAMILY MEDICINE

## 2019-10-23 PROCEDURE — 85025 COMPLETE CBC W/AUTO DIFF WBC: CPT | Performed by: FAMILY MEDICINE

## 2019-10-23 RX ORDER — CHLORHEXIDINE GLUCONATE ORAL RINSE 1.2 MG/ML
15 SOLUTION DENTAL 2 TIMES DAILY PRN
Qty: 473 ML | Refills: 3 | Status: SHIPPED | OUTPATIENT
Start: 2019-10-23 | End: 2020-06-30

## 2019-10-23 RX ORDER — FAMOTIDINE 20 MG/1
20 TABLET, FILM COATED ORAL 2 TIMES DAILY
Qty: 180 TABLET | Refills: 3 | Status: SHIPPED | OUTPATIENT
Start: 2019-10-23 | End: 2020-09-08

## 2019-10-23 ASSESSMENT — PAIN SCALES - GENERAL: PAINLEVEL: NO PAIN (0)

## 2019-10-23 NOTE — PROGRESS NOTES
Subjective     Hemalatha Handy is a 79 year old female who presents to clinic today for the following health issues:    HPI   Hypertension Follow-up      Do you check your blood pressure regularly outside of the clinic? No     Are you following a low salt diet? No    Are your blood pressures ever more than 140 on the top number (systolic) OR more   than 90 on the bottom number (diastolic), for example 140/90?       How many servings of fruits and vegetables do you eat daily?  0-1    On average, how many sweetened beverages do you drink each day (soda, juice, sweet tea, etc)?   0    How many days per week do you miss taking your medication? 0        none            Reviewed and updated as needed this visit by Provider         Review of Systems   ROS COMP: a lot of time has back pain on left side  Ibuprofen helps some    Sometimes into left buttock     Occasional walking for exercise          Objective    /82 (BP Location: Left arm, Patient Position: Chair, Cuff Size: Adult Regular)   Pulse 92   Temp 97.6  F (36.4  C) (Oral)   Wt 70.8 kg (156 lb)   Breastfeeding? No   BMI 28.53 kg/m    Body mass index is 28.53 kg/m .  Physical Exam  Constitutional:       Appearance: She is well-developed.   HENT:      Head: Normocephalic and atraumatic.   Eyes:      Conjunctiva/sclera: Conjunctivae normal.   Neck:      Vascular: No carotid bruit.   Cardiovascular:      Rate and Rhythm: Normal rate and regular rhythm.      Heart sounds: Normal heart sounds.   Pulmonary:      Effort: Pulmonary effort is normal. No respiratory distress.      Breath sounds: Normal breath sounds.   Neurological:      Mental Status: She is alert and oriented to person, place, and time.         good strength and sensation in both legs  Neg straight leg raising     No point tenderness over left low/mid back  Diagnostic Test Results:  Labs reviewed in Epic           ASSESSMENT / PLAN:  (I10) Essential hypertension with goal blood pressure less  than 140/90  (primary encounter diagnosis)  Comment: blood pressure okay here  Plan: stay on same medications    (K21.9) Gastroesophageal reflux disease, esophagitis presence not specified  Comment: due to concern about ranitidine, change to famotidine instead   Plan: famotidine (PEPCID) 20 MG tablet             (M54.9) Mid back pain  Comment: discussed in detail.  No radiculopathy.   Plan: stressed importance of stretching/ strengthening/ walking.  I printed a lot of back exercise diagrams for her.    (Z23) Need for prophylactic vaccination and inoculation against influenza  Comment: needs   Plan: INFLUENZA (HIGH DOSE) 3 VALENT VACCINE [45352],        ADMIN INFLUENZA (For MEDICARE Patients ONLY)         []        Given     (Z00.00) Health care maintenance  Comment: patient wanted prescription for this   Plan: chlorhexidine (PERIDEX) 0.12 % solution             (R73.01) Impaired fasting glucose  Comment: check   Plan: Hemoglobin A1c             (E78.5) Hyperlipidemia LDL goal <100  Comment: fasting today   Plan: Lipid panel reflex to direct LDL Fasting,         Comprehensive metabolic panel             (R53.83) Fatigue, unspecified type  Comment: check   Plan: CBC with platelets differential, TSH with free         T4 reflex               I reviewed the patient's medications, allergies, medical history, family history, and social history.    Javier Jones MD

## 2019-10-23 NOTE — LETTER
"[facility]   [address] [place]  [address]  [phone]                                   October 24, 2019    Hemalatha Handy  [address]  [facility] [address]        Dear Hemalatha,    The LDL \"bad\" cholesterol is slightly high, similar to last year.  Keep working on healthy diet/exercise.    Other labs are all fine.    The normal hemoglobin a1c means you do not have diabetes.    Results for orders placed or performed in visit on 10/23/19   Lipid panel reflex to direct LDL Fasting   Result Value Ref Range    Cholesterol 194 <200 mg/dL    Triglycerides 125 <150 mg/dL    HDL Cholesterol 56 >49 mg/dL    LDL Cholesterol Calculated 113 (H) <100 mg/dL    Non HDL Cholesterol 138 (H) <130 mg/dL   Comprehensive metabolic panel   Result Value Ref Range    Sodium 141 133 - 144 mmol/L    Potassium 4.5 3.4 - 5.3 mmol/L    Chloride 106 94 - 109 mmol/L    Carbon Dioxide 31 20 - 32 mmol/L    Anion Gap 4 3 - 14 mmol/L    Glucose 101 (H) 70 - 99 mg/dL    Urea Nitrogen 17 7 - 30 mg/dL    Creatinine 0.66 0.52 - 1.04 mg/dL    GFR Estimate 84 >60 mL/min/[1.73_m2]    GFR Estimate If Black >90 >60 mL/min/[1.73_m2]    Calcium 9.1 8.5 - 10.1 mg/dL    Bilirubin Total 0.4 0.2 - 1.3 mg/dL    Albumin 4.0 3.4 - 5.0 g/dL    Protein Total 7.3 6.8 - 8.8 g/dL    Alkaline Phosphatase 96 40 - 150 U/L    ALT 24 0 - 50 U/L    AST 22 0 - 45 U/L   CBC with platelets differential   Result Value Ref Range    WBC 4.7 4.0 - 11.0 10e9/L    RBC Count 4.54 3.8 - 5.2 10e12/L    Hemoglobin 14.2 11.7 - 15.7 g/dL    Hematocrit 44.2 35.0 - 47.0 %    MCV 97 78 - 100 fl    MCH 31.3 26.5 - 33.0 pg    MCHC 32.1 31.5 - 36.5 g/dL    RDW 12.9 10.0 - 15.0 %    Platelet Count 206 150 - 450 10e9/L    % Neutrophils 59.4 %    % Lymphocytes 26.1 %    % Monocytes 11.3 %    % Eosinophils 3.0 %    % Basophils 0.2 %    Absolute Neutrophil 2.8 1.6 - 8.3 10e9/L    Absolute Lymphocytes 1.2 0.8 - 5.3 10e9/L    Absolute Monocytes " 0.5 0.0 - 1.3 10e9/L    Absolute Eosinophils 0.1 0.0 - 0.7 10e9/L    Absolute Basophils 0.0 0.0 - 0.2 10e9/L    Diff Method Automated Method    Hemoglobin A1c   Result Value Ref Range    Hemoglobin A1C 5.4 0 - 5.6 %   TSH with free T4 reflex   Result Value Ref Range    TSH 2.18 0.40 - 4.00 mU/L       If you have any questions please call the clinic at [phone]    Sincerely,    Javier Jones MD  [facility]

## 2019-10-23 NOTE — PATIENT INSTRUCTIONS
Keep working on healthy diet/exercise and wt loss    We will send you lab results    Stretching/ range of motion exercises for the back    Start famotidine instead of ranitidine

## 2019-10-24 NOTE — RESULT ENCOUNTER NOTE
"The LDL \"bad\" cholesterol is slightly high, similar to last year.  Keep working on healthy diet/exercise.    Other labs are all fine.    The normal hemoglobin a1c means you do not have diabetes.    Javier Jones MD  "

## 2020-01-28 DIAGNOSIS — F41.9 ANXIETY: ICD-10-CM

## 2020-01-28 NOTE — TELEPHONE ENCOUNTER
Requested Prescriptions   Pending Prescriptions Disp Refills     ALPRAZolam (XANAX) 0.25 MG tablet 30 tablet 0       There is no refill protocol information for this order         Last Written Prescription Date:  12/23/19  Last Fill Quantity: 30,   # refills: 0  Last Office Visit: 10/23/19  Future Office visit:       Routing refill request to provider for review/approval because:  Drug not on the [facility], [facility] or [facility] refill protocol or controlled substance

## 2020-02-07 RX ORDER — ALPRAZOLAM 0.25 MG
TABLET ORAL
Qty: 30 TABLET | Refills: 0 | Status: SHIPPED | OUTPATIENT
Start: 2020-02-07 | End: 2020-03-13

## 2020-02-07 NOTE — TELEPHONE ENCOUNTER
Patient calling regarding refill request. She states her pharmacy has sent a couple of requests with no response. TC sees 2 encounters for refill request that have not yet been addressed. Please notify patient if medication will not be sent today.

## 2020-03-13 DIAGNOSIS — F41.9 ANXIETY: ICD-10-CM

## 2020-03-13 RX ORDER — ALPRAZOLAM 0.25 MG
TABLET ORAL
Qty: 30 TABLET | Refills: 0 | Status: SHIPPED | OUTPATIENT
Start: 2020-03-13 | End: 2020-04-21

## 2020-03-13 NOTE — TELEPHONE ENCOUNTER
Requested Prescriptions   Pending Prescriptions Disp Refills     ALPRAZolam (XANAX) 0.25 MG tablet 30 tablet 0     Sig: TAKE 1 TABLET BY MOUTH THREE TIMES DAILY AS NEEDED FOR ANXIETY       There is no refill protocol information for this order         Last Written Prescription Date:  2/7/20  Last Fill Quantity: 30,   # refills: 0  Last Office Visit: 10/23/19  Future Office visit:       Routing refill request to provider for review/approval because:  Drug not on the [facility], P or [facility] refill protocol or controlled substance

## 2020-03-24 ENCOUNTER — NURSE TRIAGE (OUTPATIENT)
Dept: FAMILY MEDICINE | Facility: CLINIC | Age: 80
End: 2020-03-24

## 2020-03-24 DIAGNOSIS — K04.7 DENTAL INFECTION: Primary | ICD-10-CM

## 2020-03-24 RX ORDER — AMOXICILLIN 500 MG/1
500 CAPSULE ORAL 3 TIMES DAILY
Qty: 30 CAPSULE | Refills: 0 | Status: SHIPPED | OUTPATIENT
Start: 2020-03-24 | End: 2020-06-30

## 2020-03-24 NOTE — TELEPHONE ENCOUNTER
See triage, was a little challenging to determine what her main concern was.   Sounds like could be sinus symptoms, home care advised as only symptomatic for 4 days.    She is worried her problem is her teeth.   I advised she call her dentist.   She asked me to run this by Dr. Jones as he knows her and can see her urgent care visits in the past where she got antibiotic for dental issue.    She says she has chills but no fever, denies bleeding or drainage to gums.   They hurt, pain level 4, states ibuprofen relieves it.    She needs dental work but cannot have it done with covid outbreak.    Pharmacy selected, routed to Dr. Jones.  Perhaps a phone visit to discuss more focused?      Mellissa Abad RN  [facility]      Additional Information    Negative: Sounds like a life-threatening emergency to the triager    Negative: Difficulty breathing, and not from stuffy nose (e.g., not relieved by cleaning out the nose)    Negative: SEVERE headache and has fever    Negative: Patient sounds very sick or weak to the triager    Negative: SEVERE sinus pain    Negative: Severe headache    Negative: Redness or swelling on the cheek, forehead, or around the eye    Negative: Fever > 103 F (39.4 C)    Negative: Fever > 101 F (38.3 C) and over 60 years of age    Negative: Fever > 100.0 F (37.8 C) and has diabetes mellitus or a weak immune system (e.g., HIV positive, cancer chemotherapy, organ transplant, splenectomy, chronic steroids)    Negative: Fever > 100.0 F (37.8 C) and bedridden (e.g., nursing home patient, stroke, chronic illness, recovering from surgery)    Negative: Fever present > 3 days (72 hours)    Negative: Fever returns after gone for over 24 hours and symptoms worse or not improved    Negative: Sinus pain (not just congestion) and fever    Negative: Earache    Negative: Sinus congestion (pressure, fullness) present > 10 days    Negative: Nasal discharge present > 10 days    Negative:  "Using nasal washes and pain medicine > 24 hours and sinus pain (lower forehead, cheekbone, or eye) persists    Negative: Lots of coughing    Negative: Patient wants to be seen    Sinus congestion as part of a cold, present < 10 days    Answer Assessment - Initial Assessment Questions  1. LOCATION: \"Where does it hurt?\"       Teeth and around eyes  2. ONSET: \"When did the sinus pain start?\"  (e.g., hours, days)       4 days   3. SEVERITY: \"How bad is the pain?\"   (Scale 1-10; mild, moderate or severe)    - MILD (1-3): doesn't interfere with normal activities     - MODERATE (4-7): interferes with normal activities (e.g., work or school) or awakens from sleep    - SEVERE (8-10): excruciating pain and patient unable to do any normal activities         4  4. RECURRENT SYMPTOM: \"Have you ever had sinus problems before?\" If so, ask: \"When was the last time?\" and \"What happened that time?\"       No, but has had dental abscess was treated by dentist (antibiotics) about 2 months ago, she needs root canal and other dental work  5. NASAL CONGESTION: \"Is the nose blocked?\" If so, ask, \"Can you open it or must you breathe through the mouth?\"      Can breath through her nose  6. NASAL DISCHARGE: \"Do you have discharge from your nose?\" If so ask, \"What color?\"      clear  7. FEVER: \"Do you have a fever?\" If so, ask: \"What is it, how was it measured, and when did it start?\"       No fever known, occasional chills  8. OTHER SYMPTOMS: \"Do you have any other symptoms?\" (e.g., sore throat, cough, earache, difficulty breathing)      Says nostrils feel a little burning, some upset stomach  9. PREGNANCY: \"Is there any chance you are pregnant?\" \"When was your last menstrual period?\"      no    Protocols used: SINUS PAIN AND CONGESTION-A-OH    "

## 2020-03-24 NOTE — TELEPHONE ENCOUNTER
Reason for call:  Patient reporting a symptom    Symptom or request: Gums are sore    Duration (how long have symptoms been present): 4 days.    Have you been treated for this before? No    Additional comments: Pt would like to speak with a nurse    Phone Number patient can be reached at:  Home number on file [phone] (home)    Best Time:  Anytime    Can we leave a detailed message on this number:  NO    Call taken on 3/24/2020 at 12:13 PM by Josie Hazel

## 2020-03-24 NOTE — TELEPHONE ENCOUNTER
I called and advised patient of provider's response and Rx sent.    Patient verbalized understanding of and agreement with plan.  Mellissa Abad RN  [facility]

## 2020-03-24 NOTE — TELEPHONE ENCOUNTER
I sent in prescription.  If symptoms not resolving then do a formal phone visit.    Please inform patient    Javier Jones MD

## 2020-04-03 ENCOUNTER — TELEPHONE (OUTPATIENT)
Dept: FAMILY MEDICINE | Facility: CLINIC | Age: 80
End: 2020-04-03

## 2020-04-03 NOTE — TELEPHONE ENCOUNTER
Stop antibiotics. She was on the antibiotics over 1 week so shouldn't need alternative. She should take 25-50mg of benadryl every 8 hours if there is a rash or swelling. Will add amoxicillin to her allergies.   Fanta Virgen PA-C

## 2020-04-03 NOTE — TELEPHONE ENCOUNTER
Attempt # 1  Called patient at home number.[phone] (home)  Was call answered?  Yes, relayed message from provider to patient. Patient verbalized understanding and agreement with plan and had no questions.            Zandra Amador RN  [facility]

## 2020-04-03 NOTE — TELEPHONE ENCOUNTER
Attempt # 1  Called patient at home number.[phone] (home)  Was call answered?  Yes, yesterday took the antibiotic and then Benadryl 25 mg today started to itch again, right eye lid is puffy with fluid and is red does not hurt, not hot, was bump on eyelid, after taking the Benadryl the bump went away and is now just red.   Still has Amoxicillin left but only took one yesterday - has not taken since. ABX for sinus issues prescribed by Dr. Jones 3/24/2020.  No SOB/dyspnea - speaking in strong voice in full sentences without obvious signs of distress. Feels better not itchy since the Benadryl.    Nurse advised patient drink 6 to 8 eight ounces of water per day, stop taking the Amoxicillin until advised from PCP.   If develops: dyspnea/SOB, swelling of lips/tongue/throat, blue fingernail/lips call 911 Patient verbalized understanding and agreement with plan and had no further questions.      Patient waiting for return call.                Zandra Amador RN  [facility]

## 2020-04-03 NOTE — TELEPHONE ENCOUNTER
Reason for call:  Patient reporting a symptom    Symptom or request: itching, welts developing, swollen eye    Duration (how long have symptoms been present): since wednesday    Have you been treated for this before? No    Additional comments: patient started antibiotics on 3/24, she had been taking the med for over a week when she started itching really bad on Wednesday and could tell there were welts developing. She took benadryl and that helped for a little bit. She stopped taking her antibiotics yesterday, and she is still having the itching and now her eye is swollen    Phone Number patient can be reached at:  Home number on file [phone] (home)    Best Time:  asap    Can we leave a detailed message on this number:  NO, patient says she will answer her phone    Call taken on 4/3/2020 at 10:03 AM by Dinesh Pleitez

## 2020-04-21 DIAGNOSIS — F41.9 ANXIETY: ICD-10-CM

## 2020-04-21 RX ORDER — ALPRAZOLAM 0.25 MG
TABLET ORAL
Qty: 30 TABLET | Refills: 0 | Status: SHIPPED | OUTPATIENT
Start: 2020-04-21 | End: 2020-05-26

## 2020-04-21 NOTE — TELEPHONE ENCOUNTER
Requested Prescriptions   Pending Prescriptions Disp Refills     ALPRAZolam (XANAX) 0.25 MG tablet 30 tablet 0     Sig: TAKE 1 TABLET BY MOUTH THREE TIMES DAILY AS NEEDED FOR ANXIETY       There is no refill protocol information for this order         Last Written Prescription Date:  3/13/20  Last Fill Quantity: 30,   # refills: 0  Last Office Visit: 10/23/19  Future Office visit:       Routing refill request to provider for review/approval because:  Drug not on the [facility], P or [facility] refill protocol or controlled substance

## 2020-05-26 DIAGNOSIS — F41.9 ANXIETY: ICD-10-CM

## 2020-05-26 RX ORDER — ALPRAZOLAM 0.25 MG
TABLET ORAL
Qty: 30 TABLET | Refills: 0 | Status: SHIPPED | OUTPATIENT
Start: 2020-05-26 | End: 2020-06-30

## 2020-05-26 NOTE — TELEPHONE ENCOUNTER
Last Written Prescription Date:  4-  Last Fill Quantity: 30,   # refills: 0  Last Office Visit: 10-23-19  Future Office visit:       Routing refill request to provider for review/approval because:  Drug not on the [facility], [facility] or [facility] refill protocol or controlled substanceRequested Prescriptions   Pending Prescriptions Disp Refills     ALPRAZolam (XANAX) 0.25 MG tablet 30 tablet 0     Sig: TAKE 1 TABLET BY MOUTH THREE TIMES DAILY AS NEEDED FOR ANXIETY       There is no refill protocol information for this order

## 2020-08-06 DIAGNOSIS — F41.9 ANXIETY: ICD-10-CM

## 2020-08-06 RX ORDER — ALPRAZOLAM 0.25 MG
TABLET ORAL
Qty: 30 TABLET | Refills: 0 | Status: SHIPPED | OUTPATIENT
Start: 2020-08-06 | End: 2020-09-08

## 2020-08-06 NOTE — TELEPHONE ENCOUNTER
Requested Prescriptions   Pending Prescriptions Disp Refills    ALPRAZolam (XANAX) 0.25 MG tablet 30 tablet 0       There is no refill protocol information for this order        Routing refill request to provider for review/approval because:  Drug not on the [facility] refill protocol   Lynne LopezRN,BSN  [facility]

## 2020-08-06 NOTE — TELEPHONE ENCOUNTER
Okayed refill but advise clinic face to face appointment in the next 1-2 months    Please inform patient    Javier Jones MD

## 2020-09-08 ENCOUNTER — OFFICE VISIT (OUTPATIENT)
Dept: FAMILY MEDICINE | Facility: CLINIC | Age: 80
End: 2020-09-08
Payer: COMMERCIAL

## 2020-09-08 VITALS
TEMPERATURE: 97.8 F | SYSTOLIC BLOOD PRESSURE: 128 MMHG | HEIGHT: 62 IN | BODY MASS INDEX: 28.16 KG/M2 | DIASTOLIC BLOOD PRESSURE: 76 MMHG | WEIGHT: 153 LBS | HEART RATE: 75 BPM

## 2020-09-08 DIAGNOSIS — I10 ESSENTIAL HYPERTENSION WITH GOAL BLOOD PRESSURE LESS THAN 140/90: ICD-10-CM

## 2020-09-08 DIAGNOSIS — F41.9 ANXIETY: Primary | ICD-10-CM

## 2020-09-08 DIAGNOSIS — K21.9 GASTROESOPHAGEAL REFLUX DISEASE, ESOPHAGITIS PRESENCE NOT SPECIFIED: ICD-10-CM

## 2020-09-08 DIAGNOSIS — M19.90 ARTHRITIS: ICD-10-CM

## 2020-09-08 PROCEDURE — 99214 OFFICE O/P EST MOD 30 MIN: CPT | Performed by: FAMILY MEDICINE

## 2020-09-08 RX ORDER — ALPRAZOLAM 0.25 MG
TABLET ORAL
Qty: 30 TABLET | Refills: 0 | Status: SHIPPED | OUTPATIENT
Start: 2020-09-08 | End: 2020-10-13

## 2020-09-08 RX ORDER — ALPRAZOLAM 0.25 MG
TABLET ORAL
Qty: 30 TABLET | Refills: 0 | Status: CANCELLED | OUTPATIENT
Start: 2020-09-08

## 2020-09-08 RX ORDER — FAMOTIDINE 20 MG/1
20 TABLET, FILM COATED ORAL 2 TIMES DAILY
Qty: 180 TABLET | Refills: 3 | Status: SHIPPED | OUTPATIENT
Start: 2020-09-08 | End: 2021-05-26

## 2020-09-08 ASSESSMENT — ANXIETY QUESTIONNAIRES
2. NOT BEING ABLE TO STOP OR CONTROL WORRYING: SEVERAL DAYS
1. FEELING NERVOUS, ANXIOUS, OR ON EDGE: NOT AT ALL
GAD7 TOTAL SCORE: 4
IF YOU CHECKED OFF ANY PROBLEMS ON THIS QUESTIONNAIRE, HOW DIFFICULT HAVE THESE PROBLEMS MADE IT FOR YOU TO DO YOUR WORK, TAKE CARE OF THINGS AT HOME, OR GET ALONG WITH OTHER PEOPLE: SOMEWHAT DIFFICULT
6. BECOMING EASILY ANNOYED OR IRRITABLE: SEVERAL DAYS
5. BEING SO RESTLESS THAT IT IS HARD TO SIT STILL: NOT AT ALL
7. FEELING AFRAID AS IF SOMETHING AWFUL MIGHT HAPPEN: NOT AT ALL
3. WORRYING TOO MUCH ABOUT DIFFERENT THINGS: SEVERAL DAYS

## 2020-09-08 ASSESSMENT — PAIN SCALES - GENERAL: PAINLEVEL: NO PAIN (0)

## 2020-09-08 ASSESSMENT — MIFFLIN-ST. JEOR: SCORE: 1117.25

## 2020-09-08 ASSESSMENT — PATIENT HEALTH QUESTIONNAIRE - PHQ9: 5. POOR APPETITE OR OVEREATING: SEVERAL DAYS

## 2020-09-08 NOTE — PROGRESS NOTES
Subjective     Hemalatha Handy is a 80 year old female who presents to clinic today for the following health issues:    HPI       Anxiety Follow-Up    How are you doing with your anxiety since your last visit? No change    Are you having other symptoms that might be associated with anxiety? No    Have you had a significant life event? No     Are you feeling depressed? No    Do you have any concerns with your use of alcohol or other drugs? No    Social History     Tobacco Use     Smoking status: Former Smoker     Last attempt to quit: 1/1/2005     Years since quitting: 15.6     Smokeless tobacco: Never Used   Substance Use Topics     Alcohol use: Yes     Drug use: No     SELINA-7 SCORE 8/1/2016 8/23/2017   Total Score 3 8     No flowsheet data found.  SELINA-7  8/23/2017   1. Feeling nervous, anxious, or on edge 2   2. Not being able to stop or control worrying 2   3. Worrying too much about different things 2   4. Trouble relaxing 2   5. Being so restless that it is hard to sit still 0   6. Becoming easily annoyed or irritable 0   7. Feeling afraid, as if something awful might happen 0   SELINA-7 Total Score 8   If you checked any problems, how difficult have they made it for you to do your work, take care of things at home, or get along with other people? Not difficult at all         How many servings of fruits and vegetables do you eat daily?  0-1    On average, how many sweetened beverages do you drink each day (Examples: soda, juice, sweet tea, etc.  Do NOT count diet or artificially sweetened beverages)?   0    How many days per week do you exercise enough to make your heart beat faster?     How many minutes a day do you exercise enough to make your heart beat faster?     How many days per week do you miss taking your medication? 0     Review of Systems    turned 80    Some aches and pains    Walks some, should do more she states    Not daily    For 1/2 hour     Ex  is living with patient    Going okay    Was  "living on own for a long time    Has not been taking the famotidine    Some acid symptoms     Takes the alprazolam at night,  Most but not every night    IBS acts up some    Careful of what she eats    Occasional imodium        Objective    /76 (BP Location: Right arm, Patient Position: Chair, Cuff Size: Adult Regular)   Pulse 75   Temp 97.8  F (36.6  C) (Oral)   Ht 1.575 m (5' 2\")   Wt 69.4 kg (153 lb)   Breastfeeding No   BMI 27.98 kg/m    Body mass index is 27.98 kg/m .  Physical Exam  Constitutional:       Appearance: She is well-developed.   HENT:      Head: Normocephalic and atraumatic.   Eyes:      Conjunctiva/sclera: Conjunctivae normal.   Neck:      Vascular: No carotid bruit.   Cardiovascular:      Rate and Rhythm: Normal rate and regular rhythm.      Heart sounds: Normal heart sounds.   Pulmonary:      Effort: Pulmonary effort is normal. No respiratory distress.      Breath sounds: Normal breath sounds.   Abdominal:      Palpations: Abdomen is soft.      Tenderness: There is no abdominal tenderness.   Neurological:      Mental Status: She is alert and oriented to person, place, and time.           ASSESSMENT / PLAN:  (F41.9) Anxiety  (primary encounter diagnosis)  Comment: patient uses this most but not every night  Plan: ALPRAZolam (XANAX) 0.25 MG tablet        Refill     (K21.9) Gastroesophageal reflux disease, esophagitis presence not specified  Comment: we agreed she should restart this   Plan: famotidine (PEPCID) 20 MG tablet             (I10) Essential hypertension with goal blood pressure less than 140/90  Comment: blood pressure okay   Plan: no change in meds     Arthritis; takes over the counter meds prn.  Urged patient to stay active.    See us in next few months for physical.    I reviewed the patient's medications, allergies, medical history, family history, and social history.    Javier Jones MD                      "

## 2020-09-08 NOTE — PATIENT INSTRUCTIONS
Increase walking    See us in the next several months for physical    Restart the famotidine ( generic pepcid )

## 2020-09-09 ASSESSMENT — ANXIETY QUESTIONNAIRES: GAD7 TOTAL SCORE: 4

## 2020-10-13 ENCOUNTER — TRANSFERRED RECORDS (OUTPATIENT)
Dept: HEALTH INFORMATION MANAGEMENT | Facility: CLINIC | Age: 80
End: 2020-10-13

## 2020-11-10 ENCOUNTER — OFFICE VISIT (OUTPATIENT)
Dept: FAMILY MEDICINE | Facility: CLINIC | Age: 80
End: 2020-11-10
Payer: COMMERCIAL

## 2020-11-10 VITALS
HEART RATE: 89 BPM | OXYGEN SATURATION: 98 % | TEMPERATURE: 98.4 F | WEIGHT: 154.4 LBS | BODY MASS INDEX: 28.24 KG/M2 | DIASTOLIC BLOOD PRESSURE: 79 MMHG | SYSTOLIC BLOOD PRESSURE: 149 MMHG

## 2020-11-10 DIAGNOSIS — M17.12 PRIMARY OSTEOARTHRITIS OF LEFT KNEE: Primary | ICD-10-CM

## 2020-11-10 DIAGNOSIS — Z23 NEED FOR INFLUENZA VACCINATION: ICD-10-CM

## 2020-11-10 PROCEDURE — 90662 IIV NO PRSV INCREASED AG IM: CPT | Performed by: PHYSICIAN ASSISTANT

## 2020-11-10 PROCEDURE — 20610 DRAIN/INJ JOINT/BURSA W/O US: CPT | Mod: LT | Performed by: PHYSICIAN ASSISTANT

## 2020-11-10 PROCEDURE — G0008 ADMIN INFLUENZA VIRUS VAC: HCPCS | Performed by: PHYSICIAN ASSISTANT

## 2020-11-10 PROCEDURE — 99212 OFFICE O/P EST SF 10 MIN: CPT | Mod: 25 | Performed by: PHYSICIAN ASSISTANT

## 2020-11-10 RX ORDER — METHYLPREDNISOLONE ACETATE 80 MG/ML
80 INJECTION, SUSPENSION INTRA-ARTICULAR; INTRALESIONAL; INTRAMUSCULAR; SOFT TISSUE ONCE
Status: COMPLETED | OUTPATIENT
Start: 2020-11-10 | End: 2020-11-10

## 2020-11-10 RX ORDER — LIDOCAINE HYDROCHLORIDE 10 MG/ML
4 INJECTION, SOLUTION INFILTRATION; PERINEURAL ONCE
Status: COMPLETED | OUTPATIENT
Start: 2020-11-10 | End: 2020-11-10

## 2020-11-10 RX ADMIN — LIDOCAINE HYDROCHLORIDE 4 ML: 10 INJECTION, SOLUTION INFILTRATION; PERINEURAL at 12:38

## 2020-11-10 RX ADMIN — METHYLPREDNISOLONE ACETATE 80 MG: 80 INJECTION, SUSPENSION INTRA-ARTICULAR; INTRALESIONAL; INTRAMUSCULAR; SOFT TISSUE at 12:34

## 2020-11-10 ASSESSMENT — PAIN SCALES - GENERAL: PAINLEVEL: SEVERE PAIN (6)

## 2020-11-10 NOTE — NURSING NOTE
VIS for Flu vaccines given on same date of administration.  Staff signature/Title: Levi Ying MA on 11/10/2020 at 12:03 PM

## 2020-11-10 NOTE — PROGRESS NOTES
Subjective     Hemalatha Handy is a 80 year old female who presents to clinic today for the following health issues:    HPI         Musculoskeletal left leg pain  Onset/Duration: left leg/ a week  Description  Location: leg - left  Joint Swelling: YES  Redness: no  Pain: YES  Warmth: no  Intensity:  6/10  Progression of Symptoms:  improving  Accompanying signs and symptoms:   Fevers: no  Numbness/tingling/weakness: no  History  Trauma to the area: no  Recent illness:  no  Previous similar problem: YES  Previous evaluation:  no  Precipitating or alleviating factors:  Aggravating factors include: walking and climbing stairs  Therapies tried and outcome: support wrap    Pain after doing a lot of walking in the last few weeks. Has been walking her sons dogs about 4 times a day. Crouching to put leash on collar. Noticed pain after a day or two.    Has had a knee injection in the past - can't remember when. Is interested in having a knee injection today.    Review of Systems   Constitutional, HEENT, cardiovascular, pulmonary, gi and gu systems are negative, except as otherwise noted.      Objective    BP (!) 149/79 (BP Location: Right arm, Patient Position: Chair, Cuff Size: Adult Regular)   Pulse 89   Temp 98.4  F (36.9  C) (Oral)   Wt 70 kg (154 lb 6.4 oz)   SpO2 98%   BMI 28.24 kg/m    Body mass index is 28.24 kg/m .  Physical Exam   GENERAL: healthy, alert and no distress  NECK: no adenopathy, no asymmetry, masses, or scars and thyroid normal to palpation  RESP: lungs clear to auscultation - no rales, rhonchi or wheezes  CV: regular rate and rhythm, normal S1 S2, no S3 or S4, no murmur, click or rub, no peripheral edema and peripheral pulses strong  ABDOMEN: soft, nontender, no hepatosplenomegaly, no masses and bowel sounds normal  MS: no gross musculoskeletal defects noted, no edema    Xray and MRI - reviewed from 2015. Mild osteoarthritis. Degenerative meniscus tearing.        Assessment & Plan     Primary  "osteoarthritis of left knee   PARQ (Procedures, Alternatives, Risks, Questions) obtained. Using an anterolateral approach, 4 ml 1% lidocaine and 1 ml 80 mg depo medrol was injected into the left knee without complication. All questions answered.  Return for retreatment as needed.  - methylPREDNISolone (DEPO-MEDROL) injection 80 mg  - Large Joint/Bursa injection and/or drainage (Shoulder, Knee)  - lidocaine 1 % injection 4 mL    Need for influenza vaccination  Vaccinated today.  - FLUZONE HIGH DOSE 65+  [66341]     BMI:   Estimated body mass index is 28.24 kg/m  as calculated from the following:    Height as of 9/8/20: 1.575 m (5' 2\").    Weight as of this encounter: 70 kg (154 lb 6.4 oz).   Weight management plan: Discussed healthy diet and exercise guidelines         No follow-ups on file.    Beatriz Jarrett PA-C  [facility]    "

## 2020-11-24 DIAGNOSIS — F41.9 ANXIETY: ICD-10-CM

## 2020-11-24 RX ORDER — ALPRAZOLAM 0.25 MG
TABLET ORAL
Qty: 30 TABLET | Refills: 0 | Status: SHIPPED | OUTPATIENT
Start: 2020-11-24 | End: 2020-12-29

## 2020-11-24 NOTE — TELEPHONE ENCOUNTER
Routing refill request to provider for review/approval because:  Drug not on the [facility] refill protocol     Leora Plummer RN, BSN, PHN  [facility]: [place]

## 2020-11-27 ENCOUNTER — TELEPHONE (OUTPATIENT)
Dept: FAMILY MEDICINE | Facility: CLINIC | Age: 80
End: 2020-11-27

## 2020-11-27 DIAGNOSIS — F41.9 ANXIETY: ICD-10-CM

## 2020-11-27 RX ORDER — CITALOPRAM HYDROBROMIDE 20 MG/1
20 TABLET ORAL DAILY
Qty: 90 TABLET | Refills: 3 | Status: CANCELLED | OUTPATIENT
Start: 2020-11-27

## 2020-11-27 NOTE — TELEPHONE ENCOUNTER
Attempt # 1  Called patient at home number.  Was call answered?  Yes, patient will call pharmacy for refill - should have refills until June 2021.            Zandra Amador RN  [facility]

## 2020-11-27 NOTE — TELEPHONE ENCOUNTER
Reason for Call:  Medication or medication refill:    Do you use a [facility]?  Name of the pharmacy and phone number for the current request:  [facility] - [place], [place] - [address]  [phone]    Name of the medication requested: citalopram (CELEXA) 20 MG tablet    Other request: Patient is needing refill on above medication. Please call with any questions.     Can we leave a detailed message on this number? YES    Phone number patient can be reached at: Cell number on file:    Telephone Information:   Mobile [phone]       Best Time: anytime    Call taken on 11/27/2020 at 9:17 AM by Michael Martinez

## 2020-12-07 ENCOUNTER — TELEPHONE (OUTPATIENT)
Dept: FAMILY MEDICINE | Facility: CLINIC | Age: 80
End: 2020-12-07

## 2020-12-07 DIAGNOSIS — J01.90 ACUTE SINUSITIS WITH SYMPTOMS > 10 DAYS: Primary | ICD-10-CM

## 2020-12-07 RX ORDER — AZITHROMYCIN 250 MG/1
TABLET, FILM COATED ORAL
Qty: 6 TABLET | Refills: 1 | Status: SHIPPED | OUTPATIENT
Start: 2020-12-07 | End: 2021-02-05

## 2020-12-07 NOTE — TELEPHONE ENCOUNTER
I sent in prescription     If symptoms not resolving then see us in clinic    Please inform patient    Javier Jones MD

## 2020-12-07 NOTE — TELEPHONE ENCOUNTER
"Reason for call:  Patient reporting a symptom    Symptom or request: Sinus issues, headache, tired, runny nose for a while. Patient is requesting a call back from nurse.    Duration (how long have symptoms been present): Patient did not specify, states she has been feeling like this \"forever\".    Have you been treated for this before? No    Additional comments: None    Phone Number patient can be reached at:  Home number on file [phone] (home)    Best Time:  Any    Can we leave a detailed message on this number:  YES    Call taken on 12/7/2020 at 2:26 PM by Sheela Ortega  "

## 2020-12-07 NOTE — TELEPHONE ENCOUNTER
"Called patient, she reports that she has had sinus issues for \"well over a month\". She is reporting nasal pressure, nasal congestion, clear/yellow drainage, reports some fatigue and a headache. No fever noted and she stated with COVID she is \"feeling down in the dumps\". She has tried OTC Claritin with no relief.     She request a message be sent to PCP to see if she needs to be seen of if there is something he would send in.     Routed to PCP to review.     Thank you.   Cecily Ferro RN    "

## 2021-02-05 ENCOUNTER — OFFICE VISIT (OUTPATIENT)
Dept: FAMILY MEDICINE | Facility: CLINIC | Age: 81
End: 2021-02-05
Payer: COMMERCIAL

## 2021-02-05 VITALS
TEMPERATURE: 98 F | WEIGHT: 153.5 LBS | SYSTOLIC BLOOD PRESSURE: 128 MMHG | BODY MASS INDEX: 28.25 KG/M2 | DIASTOLIC BLOOD PRESSURE: 78 MMHG | HEART RATE: 78 BPM | HEIGHT: 62 IN

## 2021-02-05 DIAGNOSIS — E55.9 VITAMIN D DEFICIENCY DISEASE: ICD-10-CM

## 2021-02-05 DIAGNOSIS — R73.01 IMPAIRED FASTING GLUCOSE: ICD-10-CM

## 2021-02-05 DIAGNOSIS — I10 HYPERTENSION GOAL BP (BLOOD PRESSURE) < 140/90: Primary | ICD-10-CM

## 2021-02-05 DIAGNOSIS — R53.83 FATIGUE, UNSPECIFIED TYPE: ICD-10-CM

## 2021-02-05 DIAGNOSIS — F41.9 ANXIETY: ICD-10-CM

## 2021-02-05 DIAGNOSIS — K21.9 GASTROESOPHAGEAL REFLUX DISEASE, UNSPECIFIED WHETHER ESOPHAGITIS PRESENT: ICD-10-CM

## 2021-02-05 DIAGNOSIS — E78.5 HYPERLIPIDEMIA LDL GOAL <100: ICD-10-CM

## 2021-02-05 DIAGNOSIS — M25.511 RIGHT SHOULDER PAIN, UNSPECIFIED CHRONICITY: ICD-10-CM

## 2021-02-05 LAB
ALBUMIN SERPL-MCNC: 4 G/DL (ref 3.4–5)
ALP SERPL-CCNC: 86 U/L (ref 40–150)
ALT SERPL W P-5'-P-CCNC: 23 U/L (ref 0–50)
ANION GAP SERPL CALCULATED.3IONS-SCNC: 4 MMOL/L (ref 3–14)
AST SERPL W P-5'-P-CCNC: 19 U/L (ref 0–45)
BASOPHILS # BLD AUTO: 0 10E9/L (ref 0–0.2)
BASOPHILS NFR BLD AUTO: 0.4 %
BILIRUB SERPL-MCNC: 0.6 MG/DL (ref 0.2–1.3)
BUN SERPL-MCNC: 19 MG/DL (ref 7–30)
CALCIUM SERPL-MCNC: 9 MG/DL (ref 8.5–10.1)
CHLORIDE SERPL-SCNC: 105 MMOL/L (ref 94–109)
CHOLEST SERPL-MCNC: 193 MG/DL
CO2 SERPL-SCNC: 30 MMOL/L (ref 20–32)
CREAT SERPL-MCNC: 0.75 MG/DL (ref 0.52–1.04)
DEPRECATED CALCIDIOL+CALCIFEROL SERPL-MC: 29 UG/L (ref 20–75)
DIFFERENTIAL METHOD BLD: NORMAL
EOSINOPHIL # BLD AUTO: 0.2 10E9/L (ref 0–0.7)
EOSINOPHIL NFR BLD AUTO: 3.4 %
ERYTHROCYTE [DISTWIDTH] IN BLOOD BY AUTOMATED COUNT: 13.4 % (ref 10–15)
GFR SERPL CREATININE-BSD FRML MDRD: 75 ML/MIN/{1.73_M2}
GLUCOSE SERPL-MCNC: 95 MG/DL (ref 70–99)
HBA1C MFR BLD: 5.4 % (ref 0–5.6)
HCT VFR BLD AUTO: 45.6 % (ref 35–47)
HDLC SERPL-MCNC: 53 MG/DL
HGB BLD-MCNC: 14.8 G/DL (ref 11.7–15.7)
LDLC SERPL CALC-MCNC: 112 MG/DL
LYMPHOCYTES # BLD AUTO: 1.4 10E9/L (ref 0.8–5.3)
LYMPHOCYTES NFR BLD AUTO: 29.8 %
MCH RBC QN AUTO: 31.8 PG (ref 26.5–33)
MCHC RBC AUTO-ENTMCNC: 32.5 G/DL (ref 31.5–36.5)
MCV RBC AUTO: 98 FL (ref 78–100)
MONOCYTES # BLD AUTO: 0.5 10E9/L (ref 0–1.3)
MONOCYTES NFR BLD AUTO: 11.1 %
NEUTROPHILS # BLD AUTO: 2.6 10E9/L (ref 1.6–8.3)
NEUTROPHILS NFR BLD AUTO: 55.3 %
NONHDLC SERPL-MCNC: 140 MG/DL
PLATELET # BLD AUTO: 186 10E9/L (ref 150–450)
POTASSIUM SERPL-SCNC: 4.3 MMOL/L (ref 3.4–5.3)
PROT SERPL-MCNC: 7.3 G/DL (ref 6.8–8.8)
RBC # BLD AUTO: 4.65 10E12/L (ref 3.8–5.2)
SODIUM SERPL-SCNC: 139 MMOL/L (ref 133–144)
TRIGL SERPL-MCNC: 140 MG/DL
TSH SERPL DL<=0.005 MIU/L-ACNC: 1.2 MU/L (ref 0.4–4)
WBC # BLD AUTO: 4.7 10E9/L (ref 4–11)

## 2021-02-05 PROCEDURE — 80053 COMPREHEN METABOLIC PANEL: CPT | Performed by: FAMILY MEDICINE

## 2021-02-05 PROCEDURE — 36415 COLL VENOUS BLD VENIPUNCTURE: CPT | Performed by: FAMILY MEDICINE

## 2021-02-05 PROCEDURE — 84443 ASSAY THYROID STIM HORMONE: CPT | Performed by: FAMILY MEDICINE

## 2021-02-05 PROCEDURE — 83036 HEMOGLOBIN GLYCOSYLATED A1C: CPT | Performed by: FAMILY MEDICINE

## 2021-02-05 PROCEDURE — 82306 VITAMIN D 25 HYDROXY: CPT | Performed by: FAMILY MEDICINE

## 2021-02-05 PROCEDURE — 85025 COMPLETE CBC W/AUTO DIFF WBC: CPT | Performed by: FAMILY MEDICINE

## 2021-02-05 PROCEDURE — 99214 OFFICE O/P EST MOD 30 MIN: CPT | Performed by: FAMILY MEDICINE

## 2021-02-05 PROCEDURE — 80061 LIPID PANEL: CPT | Performed by: FAMILY MEDICINE

## 2021-02-05 RX ORDER — ALPRAZOLAM 0.25 MG
TABLET ORAL
Qty: 30 TABLET | Refills: 0 | Status: SHIPPED | OUTPATIENT
Start: 2021-02-05 | End: 2021-03-14

## 2021-02-05 RX ORDER — LISINOPRIL 10 MG/1
10 TABLET ORAL DAILY
Qty: 90 TABLET | Refills: 1 | Status: SHIPPED | OUTPATIENT
Start: 2021-02-05 | End: 2021-05-27

## 2021-02-05 ASSESSMENT — PAIN SCALES - GENERAL: PAINLEVEL: NO PAIN (0)

## 2021-02-05 ASSESSMENT — MIFFLIN-ST. JEOR: SCORE: 1119.52

## 2021-02-05 NOTE — LETTER
"February 8, 2021      Hemalatha Handy  [address]  [place] [address]        Dear ,    We are writing to inform you of your test results.    LDL \"bad\" cholesterol is only slightly high.  Keep working on healthy diet/exercise.     Other labs are all fine      Resulted Orders   Comprehensive metabolic panel   Result Value Ref Range    Sodium 139 133 - 144 mmol/L    Potassium 4.3 3.4 - 5.3 mmol/L    Chloride 105 94 - 109 mmol/L    Carbon Dioxide 30 20 - 32 mmol/L    Anion Gap 4 3 - 14 mmol/L    Glucose 95 70 - 99 mg/dL      Comment:      Fasting specimen    Urea Nitrogen 19 7 - 30 mg/dL    Creatinine 0.75 0.52 - 1.04 mg/dL    GFR Estimate 75 >60 mL/min/[1.73_m2]      Comment:      Non  GFR Calc  Starting 12/18/2018, serum creatinine based estimated GFR (eGFR) will be   calculated using the Chronic Kidney Disease Epidemiology Collaboration   (CKD-EPI) equation.      GFR Estimate If Black 87 >60 mL/min/[1.73_m2]      Comment:       GFR Calc  Starting 12/18/2018, serum creatinine based estimated GFR (eGFR) will be   calculated using the Chronic Kidney Disease Epidemiology Collaboration   (CKD-EPI) equation.      Calcium 9.0 8.5 - 10.1 mg/dL    Bilirubin Total 0.6 0.2 - 1.3 mg/dL    Albumin 4.0 3.4 - 5.0 g/dL    Protein Total 7.3 6.8 - 8.8 g/dL    Alkaline Phosphatase 86 40 - 150 U/L    ALT 23 0 - 50 U/L    AST 19 0 - 45 U/L   CBC with platelets differential   Result Value Ref Range    WBC 4.7 4.0 - 11.0 10e9/L    RBC Count 4.65 3.8 - 5.2 10e12/L    Hemoglobin 14.8 11.7 - 15.7 g/dL    Hematocrit 45.6 35.0 - 47.0 %    MCV 98 78 - 100 fl    MCH 31.8 26.5 - 33.0 pg    MCHC 32.5 31.5 - 36.5 g/dL    RDW 13.4 10.0 - 15.0 %    Platelet Count 186 150 - 450 10e9/L    % Neutrophils 55.3 %    % Lymphocytes 29.8 %    % Monocytes 11.1 %    % Eosinophils 3.4 %    % Basophils 0.4 %    Absolute Neutrophil 2.6 1.6 - 8.3 10e9/L    Absolute Lymphocytes 1.4 0.8 - 5.3 10e9/L "    Absolute Monocytes 0.5 0.0 - 1.3 10e9/L    Absolute Eosinophils 0.2 0.0 - 0.7 10e9/L    Absolute Basophils 0.0 0.0 - 0.2 10e9/L    Diff Method Automated Method    TSH with free T4 reflex   Result Value Ref Range    TSH 1.20 0.40 - 4.00 mU/L   Vitamin D Deficiency   Result Value Ref Range    Vitamin D Deficiency screening 29 20 - 75 ug/L      Comment:      Season, race, dietary intake, and treatment affect the concentration of   25-hydroxy-Vitamin D. Values may decrease during winter months and increase   during summer months. Values 20-29 ug/L may indicate Vitamin D insufficiency   and values <20 ug/L may indicate Vitamin D deficiency.  Vitamin D determination is routinely performed by an immunoassay specific for   25 hydroxyvitamin D3.  If an individual is on vitamin D2 (ergocalciferol)   supplementation, please specify 25 [place] vitamin D2 and D3 level determination by   LCMSMS test VITD23.     Hemoglobin A1c   Result Value Ref Range    Hemoglobin A1C 5.4 0 - 5.6 %      Comment:      Normal <5.7% Prediabetes 5.7-6.4%  Diabetes 6.5% or higher - adopted from ADA   consensus guidelines.     Lipid panel reflex to direct LDL Fasting   Result Value Ref Range    Cholesterol 193 <200 mg/dL    Triglycerides 140 <150 mg/dL      Comment:      Fasting specimen    HDL Cholesterol 53 >49 mg/dL    LDL Cholesterol Calculated 112 (H) <100 mg/dL      Comment:      Above desirable:  100-129 mg/dl  Borderline High:  130-159 mg/dL  High:             160-189 mg/dL  Very high:       >189 mg/dl      Non HDL Cholesterol 140 (H) <130 mg/dL      Comment:      Above Desirable:  130-159 mg/dl  Borderline high:  160-189 mg/dl  High:             190-219 mg/dl  Very high:       >219 mg/dl         If you have any questions or concerns, please call the clinic at the number listed above.       Sincerely,      Javier Jones MD/renetta

## 2021-02-05 NOTE — PROGRESS NOTES
"       Dorian Penny is a 80 year old who presents to clinic today for the following health issues      HPI       She has been feeling really tired and states that she thinks her vitamin D might be low       Review of Systems   Aches and pains    Right arm sore    Lifting a lot of thing at home    Mostly at home    Goes to store    Lives with ex       Got shot in knee, helped    Not  Sleeping  Well    Wakes  Up during night    Takes the xanax  Pretty much  Every night    Stomach okay    Mood okay    Not much exercise    Sometimes lots of gas          Objective    /78 (BP Location: Right arm, Patient Position: Chair, Cuff Size: Adult Regular)   Pulse 78   Temp 98  F (36.7  C) (Oral)   Ht 1.575 m (5' 2\")   Wt 69.6 kg (153 lb 8 oz)   Breastfeeding No   BMI 28.08 kg/m    Body mass index is 28.08 kg/m .  Physical Exam  Constitutional:       Appearance: She is well-developed.   HENT:      Head: Normocephalic and atraumatic.      Right Ear: Tympanic membrane and ear canal normal.      Left Ear: Tympanic membrane and ear canal normal.      Mouth/Throat:      Mouth: Mucous membranes are moist.      Pharynx: Oropharynx is clear.   Eyes:      Conjunctiva/sclera: Conjunctivae normal.   Neck:      Vascular: No carotid bruit.   Cardiovascular:      Rate and Rhythm: Normal rate and regular rhythm.      Heart sounds: Normal heart sounds.   Pulmonary:      Effort: Pulmonary effort is normal. No respiratory distress.      Breath sounds: Normal breath sounds.   Abdominal:      Palpations: Abdomen is soft.      Tenderness: There is no abdominal tenderness.   Neurological:      Mental Status: She is alert and oriented to person, place, and time.         no sinus/ submandib tenderness      Shoulder  Range of motion okay but some discomfort when raising arm laterally above head    Biceps fine    Minimal tenderness ant shoulder       ASSESSMENT / PLAN:  (I10) Hypertension goal BP (blood pressure) < 140/90  " (primary encounter diagnosis)  Comment: blood pressure okay; stay on same med  Plan: lisinopril (ZESTRIL) 10 MG tablet             (F41.9) Anxiety  Comment: uses most nights, keeps her functional   Plan: ALPRAZolam (XANAX) 0.25 MG tablet             (E55.9) Vitamin D deficiency disease  Comment: check this; not on vit d currently  Plan: Vitamin D Deficiency             (R53.83) Fatigue, unspecified type  Comment: check   Plan: CBC with platelets differential, TSH with free         T4 reflex             (E78.5) Hyperlipidemia LDL goal <100  Comment: fasting   Plan: Comprehensive metabolic panel, Lipid panel         reflex to direct LDL Fasting             (R73.01) Impaired fasting glucose  Comment: check   Plan: Hemoglobin A1c             (K21.9) Gastroesophageal reflux disease, unspecified whether esophagitis present  Comment: patient on h2 blocker.  Symptoms good when takes bid.   Plan: stay on bid    Could try gas x type med also     Right shoulder pain: do range of motion exercises, follow up prn symptoms       I reviewed the patient's medications, allergies, medical history, family history, and social history.    Javier Jones MD

## 2021-02-05 NOTE — PATIENT INSTRUCTIONS
Increase walking    Range of motion exercises for shoulder    We will send you lab results    Could use over the counter gas med as needed    Stay on 2x daily famotidine

## 2021-02-07 NOTE — RESULT ENCOUNTER NOTE
"LDL \"bad\" cholesterol is only slightly high.  Keep working on healthy diet/exercise.    Other labs are all fine.    Javier Jones MD  "

## 2021-02-23 ENCOUNTER — OFFICE VISIT (OUTPATIENT)
Dept: FAMILY MEDICINE | Facility: CLINIC | Age: 81
End: 2021-02-23
Payer: COMMERCIAL

## 2021-02-23 VITALS
DIASTOLIC BLOOD PRESSURE: 72 MMHG | BODY MASS INDEX: 28.17 KG/M2 | SYSTOLIC BLOOD PRESSURE: 139 MMHG | OXYGEN SATURATION: 96 % | HEART RATE: 86 BPM | WEIGHT: 154 LBS | TEMPERATURE: 97.7 F

## 2021-02-23 DIAGNOSIS — M25.511 ACUTE PAIN OF RIGHT SHOULDER: Primary | ICD-10-CM

## 2021-02-23 PROCEDURE — 99213 OFFICE O/P EST LOW 20 MIN: CPT | Performed by: NURSE PRACTITIONER

## 2021-02-23 NOTE — PROGRESS NOTES
Assessment & Plan     Acute pain of right shoulder  Exam is concerning for rotator cuff pathology. Will refer to orthopedics for further recommendations regarding imaging and intervention.  Discussed physical therapy, but patient declined.  Discussed using Voltaren gel for additional pain relief, as well as heat and/or ice.  Okay to continue using ibuprofen - discussed taking with food and only using high doses for a short period of time. Recommended she avoid sudden pushing or pulling motion or lifting heavy items with the right arm; however, cautioned her against keeping the arm immobile. Encouraged her to continue gentle movement and stretching exercises to avoid developing a frozen shoulder.  - Orthopedic & Spine  Referral; Future    Return in about 6 weeks (around 4/6/2021), or if symptoms worsen or fail to improve.    Kesha Lou, DNP/FNP Student, [facility]    I very much appreciated the opportunity to see and assess this patient in the clinic with NP student.  I agree with the above note which summarizes my findings and current recommendations. I have reviewed all diagnostics noted and performed physical exam. Changes were made in the body of the note to achieve one comprehensive document.    Cecily Mcmahon, SATHYA CNP  [facility] GWEN Penny is a 80 year old who presents for the following health issues     HPI       Musculoskeletal problem/pain  Onset/Duration: Couple months  Description  Location: shoulder - right  Joint Swelling: no  Redness: no  Pain: YES    Warmth: YES- sometimes feels like it is inside  Intensity:  4/10  Progression of Symptoms:  worsening  Accompanying signs and symptoms:   Fevers: no  Numbness/tingling/weakness: no  History  Trauma to the area: unknown ; did  a heavy bag a couple months ago and when picked up the bag felt a twitch and since that pain ha been there  Recent illness:  no  Previous similar problem:  "no  Previous evaluation:  YES- discussed 2/5/2021 OV  Precipitating or alleviating factors:  Aggravating factors include: Trying to raise arm ; certain movements  Therapies tried and outcome: rest/inactivity, heat and massage    She states two months ago she was lifting heavy with her right arm and felt something \"snap.\" She did not have any pain initially, but she has developed pain with movement over the past two months. The pain is aggravated by trying to lift her arm which rates at a 6/10 . She does not have any pain when the arm is at rest.  She has been taking ibuprofen 800 mg which helps.  Tylenol arthritis has not been helpful.  The pain has been getting worse over time.    She denies numbness or tingling in her right arm. She is right-hand dominant. She has been doing some stretching and movement exercises recommended by Dr. Jones, but these do not seem to be helping.  She has no history of trauma or pain in the right shoulder/arm prior to two months ago.    Review of Systems   Constitutional, HEENT, cardiovascular, pulmonary, gi and gu systems are negative, except as otherwise noted.      Objective    /72   Pulse 86   Temp 97.7  F (36.5  C) (Oral)   Wt 69.9 kg (154 lb)   SpO2 96%   BMI 28.17 kg/m    Body mass index is 28.17 kg/m .  Physical Exam   GENERAL: healthy, alert and no distress  NECK: no adenopathy, no asymmetry, masses, or scars and thyroid normal to palpation  RESP: lungs clear to auscultation - no rales, rhonchi or wheezes  CV: regular rate and rhythm, normal S1 S2, no S3 or S4, no murmur, click or rub, no peripheral edema and peripheral pulses strong  MS: normal range of motion in both upper extremities; pain in right shoulder with passive and active flexion, abduction, and external rotation; able to maintain extension against resistance, although this causes pain in the right shoulder; positive empty can test and Neer's sign in the RUE  NEURO: Normal strength and tone in bilaterally " upper extremities, mentation intact and speech normal

## 2021-05-26 DIAGNOSIS — K21.9 GASTROESOPHAGEAL REFLUX DISEASE, UNSPECIFIED WHETHER ESOPHAGITIS PRESENT: Primary | ICD-10-CM

## 2021-05-26 DIAGNOSIS — I10 HYPERTENSION GOAL BP (BLOOD PRESSURE) < 140/90: ICD-10-CM

## 2021-05-26 RX ORDER — FAMOTIDINE 20 MG/1
20 TABLET, FILM COATED ORAL 2 TIMES DAILY
Qty: 180 TABLET | Refills: 3 | Status: SHIPPED | OUTPATIENT
Start: 2021-05-26 | End: 2022-03-24

## 2021-05-26 NOTE — TELEPHONE ENCOUNTER
Requested Prescriptions   Pending Prescriptions Disp Refills     famotidine (PEPCID) 20 MG tablet 180 tablet 3     Sig: Take 1 tablet (20 mg) by mouth 2 times daily       There is no refill protocol information for this order

## 2021-05-27 RX ORDER — LISINOPRIL 10 MG/1
TABLET ORAL
Qty: 90 TABLET | Refills: 1 | Status: SHIPPED | OUTPATIENT
Start: 2021-05-27 | End: 2021-12-12

## 2021-08-19 ENCOUNTER — OFFICE VISIT (OUTPATIENT)
Dept: FAMILY MEDICINE | Facility: CLINIC | Age: 81
End: 2021-08-19
Payer: COMMERCIAL

## 2021-08-19 VITALS
BODY MASS INDEX: 27.85 KG/M2 | WEIGHT: 152.25 LBS | HEART RATE: 86 BPM | TEMPERATURE: 97.5 F | SYSTOLIC BLOOD PRESSURE: 126 MMHG | DIASTOLIC BLOOD PRESSURE: 72 MMHG

## 2021-08-19 DIAGNOSIS — F41.9 ANXIETY: ICD-10-CM

## 2021-08-19 DIAGNOSIS — B37.2 INTERTRIGINOUS CANDIDIASIS: ICD-10-CM

## 2021-08-19 DIAGNOSIS — L82.0 INFLAMED SEBORRHEIC KERATOSIS: Primary | ICD-10-CM

## 2021-08-19 DIAGNOSIS — K58.0 IRRITABLE BOWEL SYNDROME WITH DIARRHEA: ICD-10-CM

## 2021-08-19 PROCEDURE — 99213 OFFICE O/P EST LOW 20 MIN: CPT | Mod: 25 | Performed by: FAMILY MEDICINE

## 2021-08-19 PROCEDURE — 17110 DESTRUCTION B9 LES UP TO 14: CPT | Performed by: FAMILY MEDICINE

## 2021-08-19 RX ORDER — ALPRAZOLAM 0.25 MG
TABLET ORAL
Qty: 30 TABLET | Refills: 0 | Status: SHIPPED | OUTPATIENT
Start: 2021-08-19 | End: 2021-09-24

## 2021-08-19 RX ORDER — CLOTRIMAZOLE 1 %
CREAM (GRAM) TOPICAL 2 TIMES DAILY PRN
Qty: 60 G | Refills: 1 | Status: SHIPPED | OUTPATIENT
Start: 2021-08-19 | End: 2022-11-15

## 2021-08-19 NOTE — PROGRESS NOTES
Subjective   Hemalatha is a 81 year old who presents for the following health issues     HPI     Check spot on pubic area that is getting bigger  Discuss IBS      Review of Systems   Spot present for months  Some itching    No pain    Bowels acting  Up      Diarrhea sometimes up to 6 x daily ( or at least loose frequent stools)    Taking imodium prn, helps some    Left low back pain comes and  Goes    Occasional motrin    No gi upset from motrin    No constipation  No bloody or black stools    Salads are a trigger    Passing gas helps    No bloody  / black stools    No wt change      Objective    /72 (BP Location: Left arm, Patient Position: Chair, Cuff Size: Adult Regular)   Pulse 86   Temp 97.5  F (36.4  C) (Temporal)   Wt 69.1 kg (152 lb 4 oz)   Breastfeeding No   BMI 27.85 kg/m    Body mass index is 27.85 kg/m .  Physical Exam  Constitutional:       Appearance: She is well-developed.   HENT:      Head: Normocephalic and atraumatic.   Eyes:      Conjunctiva/sclera: Conjunctivae normal.   Neck:      Vascular: No carotid bruit.   Cardiovascular:      Rate and Rhythm: Normal rate and regular rhythm.      Heart sounds: Normal heart sounds.   Pulmonary:      Effort: Pulmonary effort is normal. No respiratory distress.      Breath sounds: Normal breath sounds.   Abdominal:      Palpations: Abdomen is soft.      Tenderness: There is no abdominal tenderness.   Neurological:      Mental Status: She is alert and oriented to person, place, and time.         patient has an inflamed seborrheic keratosis right upper pubic area; discussed in detail.  With consent froze in usual fashion.  No complications.     Has intertriginous type rash left groin area       ASSESSMENT / PLAN:  (L82.0) Inflamed seborrheic keratosis  (primary encounter diagnosis)  Comment: treated here   Plan: DESTRUCT BENIGN LESION, UP TO 14        Follow up prn     (B37.2) Intertriginous candidiasis  Comment: use this bid prn.  Could also use  athletes foot powder as needed   Plan: clotrimazole (LOTRIMIN) 1 % external cream             (F41.9) Anxiety  Comment: needs refill   Plan: ALPRAZolam (XANAX) 0.25 MG tablet        Chronic med for patient     (K58.0) Irritable bowel syndrome with diarrhea  Comment: discussed in detail   Plan: see AVS      I reviewed the patient's medications, allergies, medical history, family history, and social history.    Javier Jones MD

## 2021-09-11 DIAGNOSIS — F41.9 ANXIETY: ICD-10-CM

## 2021-09-14 RX ORDER — CITALOPRAM HYDROBROMIDE 20 MG/1
TABLET ORAL
Qty: 90 TABLET | Refills: 1 | Status: SHIPPED | OUTPATIENT
Start: 2021-09-14 | End: 2022-04-15

## 2021-09-14 NOTE — TELEPHONE ENCOUNTER
Prescription approved per [facility] Refill Protocol.    Margaret Shrestha RN

## 2021-11-19 ENCOUNTER — NURSE TRIAGE (OUTPATIENT)
Dept: FAMILY MEDICINE | Facility: CLINIC | Age: 81
End: 2021-11-19

## 2021-11-19 NOTE — TELEPHONE ENCOUNTER
"Patient called for recommendations on getting her vaccines today. She has had vertigo while laying down x 4 days. She also has had headache, sinuses feel full. No fever. Advised to hold of on vaccines until she is feeling better. Appointment canceled. Offered appointment for vertigo, patient declined.     Reason for Disposition    [1] MILD dizziness (e.g., vertigo; walking normally) AND [2] has NOT been evaluated by physician for this    Answer Assessment - Initial Assessment Questions  1. DESCRIPTION: \"Describe your dizziness.\"      vertigo  2. VERTIGO: \"Do you feel like either you or the room is spinning or tilting?\"         3. LIGHTHEADED: \"Do you feel lightheaded?\" (e.g., somewhat faint, woozy, weak upon standing)      no  4. SEVERITY: \"How bad is it?\"  \"Can you walk?\"    - MILD - Feels unsteady but walking normally.    - MODERATE - Feels very unsteady when walking, but not falling; interferes with normal activities (e.g., school, work) .    - SEVERE - Unable to walk without falling (requires assistance).      mild  5. ONSET:  \"When did the dizziness begin?\"      4 days  6. AGGRAVATING FACTORS: \"Does anything make it worse?\" (e.g., standing, change in head position)      Laying down, feels like shes spinning  7. CAUSE: \"What do you think is causing the dizziness?\"      sinus  8. RECURRENT SYMPTOM: \"Have you had dizziness before?\" If so, ask: \"When was the last time?\" \"What happened that time?\"      Yes, pt  9. OTHER SYMPTOMS: \"Do you have any other symptoms?\" (e.g., headache, weakness, numbness, vomiting, earache)      Headache, fatigue  10. PREGNANCY: \"Is there any chance you are pregnant?\" \"When was your last menstrual period?\"       na    Protocols used: DIZZINESS - VERTIGO-A-    Jayda Holly RN      "

## 2021-11-20 ENCOUNTER — OFFICE VISIT (OUTPATIENT)
Dept: URGENT CARE | Facility: URGENT CARE | Age: 81
End: 2021-11-20
Payer: COMMERCIAL

## 2021-11-20 VITALS
SYSTOLIC BLOOD PRESSURE: 139 MMHG | TEMPERATURE: 97.9 F | WEIGHT: 152.8 LBS | BODY MASS INDEX: 27.95 KG/M2 | HEART RATE: 86 BPM | DIASTOLIC BLOOD PRESSURE: 67 MMHG | OXYGEN SATURATION: 96 %

## 2021-11-20 DIAGNOSIS — J01.90 ACUTE SINUSITIS WITH COEXISTING CONDITION, NEED PROPHYLACTIC TREATMENT: Primary | ICD-10-CM

## 2021-11-20 DIAGNOSIS — H81.10 BENIGN PAROXYSMAL POSITIONAL VERTIGO, UNSPECIFIED LATERALITY: ICD-10-CM

## 2021-11-20 PROCEDURE — 99213 OFFICE O/P EST LOW 20 MIN: CPT | Performed by: PHYSICIAN ASSISTANT

## 2021-11-20 RX ORDER — DOXYCYCLINE HYCLATE 100 MG
100 TABLET ORAL 2 TIMES DAILY
Qty: 20 TABLET | Refills: 0 | Status: SHIPPED | OUTPATIENT
Start: 2021-11-20 | End: 2021-11-26

## 2021-11-20 ASSESSMENT — ENCOUNTER SYMPTOMS
JOINT SWELLING: 0
MYALGIAS: 0
SHORTNESS OF BREATH: 0
WEAKNESS: 0
VOMITING: 0
COUGH: 0
ALLERGIC/IMMUNOLOGIC NEGATIVE: 1
SORE THROAT: 0
ENDOCRINE NEGATIVE: 1
CHILLS: 0
LIGHT-HEADEDNESS: 0
SINUS PRESSURE: 1
MUSCULOSKELETAL NEGATIVE: 1
FEVER: 0
CARDIOVASCULAR NEGATIVE: 1
RHINORRHEA: 0
RESPIRATORY NEGATIVE: 1
NECK STIFFNESS: 0
NECK PAIN: 0
BACK PAIN: 0
ARTHRALGIAS: 0
DIARRHEA: 0
NAUSEA: 0
HEADACHES: 0
EYES NEGATIVE: 1
SINUS PAIN: 1
WOUND: 0
PALPITATIONS: 0
DIZZINESS: 1

## 2021-11-20 NOTE — PATIENT INSTRUCTIONS
Patient Education     Dizziness (Vertigo) and Balance Problems: Staying Safe      Replace burned-out light bulbs to keep your home safe and well lit.   Falls or accidents can lead to pain, broken bones, a hospital stay, and a fear of future falls. Protect yourself and others by preparing for episodes. Simple steps can help you stay safe at home and wherever you go.  Lighting  Keep all areas well lit. This helps your eyes send the right signals to the brain. It also makes you less likely to trip and fall. If bright lights make symptoms worse, dim the lights or lie in a dark room until the dizziness passes. Then turn the lights back to their normal level.  Tips:    Keep a flashlight by the bed.    Place nightlights in bathrooms and hallways.    Replace burned-out bulbs. Or have someone replace them for you.  Preventing falls  To reduce your risk of falling:    Get out of bed or up from a chair slowly.    Wear low-heeled shoes that fit properly and have slip-resistant soles.    Remove throw rugs. Clear clutter from walkways.    Use handrails on stairs. Have handrails installed or adjusted if needed.    Install grab bars in the bathroom. Don't use towel racks for balance.    Use a shower stool. Also put adhesive strips in the shower or on the tub floor.  Going out  With a little time and preparation, you can get around safely.  Tips:    Bring a cane or walking aid if needed.    Give yourself plenty of time in case you start to get dizzy.    Ask your healthcare provider what type of exercise is safe for your condition.    Be patient. If an activity such as walking through a crowded shop causes you stress, you may not be ready for it yet.  Driving  If you become dizzy or disoriented while driving, you could hurt yourself and others. That's why it's best to not drive until symptoms have gone away. In some cases, your license may be temporarily held until it's safe for you to drive again.  For safety:    Ask a friend to  drive for you.    Take public transportation.    Walk to stores and other places when you can.     Don't be afraid to ask for help running errands, cooking meals, and doing exercise. Whether it's a friend, loved one, neighbor, or stranger on the street, a little help can make a world of difference. Ask your healthcare provider for a list of community resources if you need help maintaining your independence at home.  [employer] last reviewed this educational content on 1/1/2020 2000-2021 [employer]. All rights reserved. This information is not intended as a substitute for professional medical care. Always follow your healthcare professional's instructions.           Patient Education     Sinusitis (Antibiotic Treatment)    The sinuses are air-filled spaces within the bones of the face. They connect to the inside of the nose. Sinusitis is an inflammation of the tissue that lines the sinuses. Sinusitis can occur during a cold. It can also happen due to allergies to pollens and other particles in the air. Sinusitis can cause symptoms of sinus congestion and a feeling of fullness. A sinus infection causes fever, headache, and facial pain. There is often green or yellow fluid draining from the nose or into the back of the throat (post-nasal drip). You have been given antibiotics to treat this condition.   Home care    Take the full course of antibiotics as instructed. Don't stop taking them, even when you feel better.    Drink plenty of water, hot tea, and other liquids as directed by the healthcare provider. This may help thin nasal mucus. It also may help your sinuses drain fluids.    Heat may help soothe painful areas of your face. Use a towel soaked in hot water. Or,  the shower and direct the warm spray onto your face. Using a vaporizer along with a menthol rub at night may also help soothe symptoms.     An expectorant with guaifenesin may help thin nasal mucus and help your sinuses drain  fluids. Talk with your provider or pharmacists before taking an over-the-counter (OTC) medicine if you have any questions about it or its side effects..    You can use an OTC decongestant, unless a similar medicine was prescribed to you. Nasal sprays work the fastest. Use one that contains phenylephrine or oxymetazoline. First blow your nose gently. Then use the spray. Don't use these medicines more often than directed on the label. If you do, your symptoms may get worse. You may also take pills that contain pseudoephedrine. Don t use products that combine multiple medicines. This is because side effects may be increased. Read labels. You can also ask the pharmacist for help. (People with high blood pressure should not use decongestants. They can raise blood pressure.) Talk with your provider or pharmacist if you have any questions about the medicine..    OTC antihistamines may help if allergies contributed to your sinusitis. Talk with your provider or pharmacist if you have any questions about the medicine..    Don't use nasal rinses or irrigation during an acute sinus infection, unless your healthcare provider tells you to. Rinsing may spread the infection to other areas in your sinuses.    Use acetaminophen or ibuprofen to control pain, unless another pain medicine was prescribed to you. If you have chronic liver or kidney disease or ever had a stomach ulcer, talk with your healthcare provider before using these medicines. Never give aspirin to anyone under age 18 who is ill with a fever. It may cause severe liver damage.    Don't smoke. This can make symptoms worse.    Follow-up care  Follow up with your healthcare provider, or as advised.   When to seek medical advice  Call your healthcare provider if any of these occur:     Facial pain or headache that gets worse    Stiff neck    Unusual drowsiness or confusion    Swelling of your forehead or eyelids    Symptoms don't go away in 10 days    Vision problems,  such as blurred or double vision    Fever of 100.4 F (38 C) or higher, or as directed by your healthcare provider  Call 911  Call 911 if any of these occur:     Seizure    Trouble breathing    Feeling dizzy or faint    Fingernails, skin or lips look blue, purple , or gray  Prevention  Here are steps you can take to help prevent an infection:     Keep good hand washing habits.    Don t have close contact with people who have sore throats, colds, or other upper respiratory infections.    Don t smoke, and stay away from secondhand smoke.    Stay up to date with of your vaccines.  [employer] last reviewed this educational content on 12/1/2019 2000-2021 [employer]. All rights reserved. This information is not intended as a substitute for professional medical care. Always follow your healthcare professional's instructions.

## 2021-11-20 NOTE — PROGRESS NOTES
Chief Complaint:    Chief Complaint   Patient presents with     Dizziness     Vertigo for 4-5 days, headache, when pt layes down head starts to spin around, stumbling forward (balance is off).         Medical Decision Making:    Vital signs reviewed by Cyril Uribe PA-C  /67 (BP Location: Left arm, Patient Position: Sitting, Cuff Size: Adult Regular)   Pulse 86   Temp 97.9  F (36.6  C) (Tympanic)   Wt 69.3 kg (152 lb 12.8 oz)   SpO2 96%   BMI 27.95 kg/m      Differential Diagnosis:  Sinus infection, BPPV, otitis media,      ASSESSMENT:     1. Acute sinusitis with coexisting condition, need prophylactic treatment    2. Benign paroxysmal positional vertigo, unspecified laterality           PLAN:     Patient is in no acute distress.  Neuro exam was benign.   Rx for Doxycycline for sinus infection.  Eply maneuver for BPPV  Patient instructed to follow up with PCP in 1 week if symptoms are not improving.  Sooner if symptoms worsen.  Worrisome symptoms discussed with instructions to go to the ED.  Patient verbalized understanding and agreed with this plan.    Labs:     No results found for any visits on 11/20/21.    Current Meds:    Current Outpatient Medications:      ALPRAZolam (XANAX) 0.25 MG tablet, TAKE 1 TABLET BY MOUTH THREE TIMES DAILY AS NEEDED FOR ANXIETY, Disp: 30 tablet, Rfl: 0     chlorhexidine (PERIDEX) 0.12 % solution, SWISH AND SPIT 15 ML BY MOUTH TWICE DAILY AS NEEDED, Disp: 473 mL, Rfl: 3     citalopram (CELEXA) 20 MG tablet, TAKE 1 TABLET(20 MG) BY MOUTH DAILY, Disp: 90 tablet, Rfl: 1     clotrimazole (LOTRIMIN) 1 % external cream, Apply topically 2 times daily as needed (fungal rash), Disp: 60 g, Rfl: 1     doxycycline hyclate (VIBRA-TABS) 100 MG tablet, Take 1 tablet (100 mg) by mouth 2 times daily for 10 days, Disp: 20 tablet, Rfl: 0     famotidine (PEPCID) 20 MG tablet, Take 1 tablet (20 mg) by mouth 2 times daily, Disp: 180 tablet, Rfl: 3     lisinopril (ZESTRIL) 10 MG tablet, TAKE 1  TABLET(10 MG) BY MOUTH DAILY, Disp: 90 tablet, Rfl: 1     MOTRIN 800 MG OR TABS, 1 TABLET 3 TIMES DAILY AS NEEDED, Disp: , Rfl:     Allergies:  Allergies   Allergen Reactions     Alcohol Hives     Amoxicillin Hives     Asa [Aspirin]      Evista [Raloxifene Hydrochloride]      Prempro      Raloxifene Hives and Rash       SUBJECTIVE    HPI: Hemalatha Handy is an 81 year old female who presents for evaluation and treatment of vertigo and sinus pain and pressure.  She has a Hx of BPPV.  She has been taking allergy medication for the sinus pain with little relief.  She denies any worsening headache, nausea, vomiting, fever or chills.  No unilateral weakness, slurring or words, ro facial droop.      ROS:      Review of Systems   Constitutional: Negative for chills and fever.   HENT: Positive for sinus pressure and sinus pain. Negative for congestion, ear pain, rhinorrhea and sore throat.    Eyes: Negative.    Respiratory: Negative.  Negative for cough and shortness of breath.    Cardiovascular: Negative.  Negative for chest pain and palpitations.   Gastrointestinal: Negative for diarrhea, nausea and vomiting.   Endocrine: Negative.    Genitourinary: Negative.    Musculoskeletal: Negative.  Negative for arthralgias, back pain, joint swelling, myalgias, neck pain and neck stiffness.   Skin: Negative.  Negative for rash and wound.   Allergic/Immunologic: Negative.  Negative for immunocompromised state.   Neurological: Positive for dizziness. Negative for weakness, light-headedness and headaches.        Family History   Family History   Problem Relation Age of Onset     Hypertension Brother      Hypertension Mother      Cancer Son         hodgkins     C.A.D. 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 No   Social History Narrative     Not on file     Social Determinants of Health     Financial Resource Strain: Not on file   Food Insecurity: Not on file   Transportation Needs: Not on file   Physical Activity: Not on file   Stress: Not on file   Social Connections: Not on file   Intimate Partner Violence: Not on file   Housing Stability: Not on file        Surgical History:  Past Surgical History:   Procedure Laterality Date     COLONOSCOPY  08     ENT SURGERY  12    Left lower lip lesion     HYSTERECTOMY, PAP NO LONGER INDICATED          Problem List:  Patient Active Problem List   Diagnosis     Anxiety     Advanced directives, counseling/discussion     GERD (gastroesophageal reflux disease)     Lateral meniscus tear, left, initial encounter     Popliteal cyst, left     Gastroesophageal reflux disease, esophagitis presence not specified     Essential hypertension with goal blood pressure less than 140/90           OBJECTIVE:     Vital signs noted and reviewed by Cyril Uribe PA-C  /67 (BP Location: Left arm, Patient Position: Sitting, Cuff Size: Adult Regular)   Pulse 86   Temp 97.9  F (36.6  C) (Tympanic)   Wt 69.3 kg (152 lb 12.8 oz)   SpO2 96%   BMI 27.95 kg/m       PEFR:    Physical Exam  Vitals and nursing note reviewed.   Constitutional:       General: She is not in acute distress.     Appearance: She is well-developed. She is not ill-appearing, toxic-appearing or diaphoretic.   HENT:      Head: Normocephalic and atraumatic.       Right Ear: Tympanic membrane and external ear normal. No drainage, swelling or tenderness. Tympanic membrane is not perforated, erythematous, retracted or bulging.      Left Ear: Tympanic membrane and external ear normal. No drainage, swelling or tenderness. Tympanic membrane is not perforated, erythematous, retracted or bulging.      Nose: No mucosal edema, congestion or rhinorrhea.      Right Sinus: Maxillary sinus tenderness and frontal sinus tenderness present.      Left Sinus: Maxillary sinus tenderness and frontal sinus tenderness present.      Mouth/Throat:      Pharynx: No pharyngeal swelling, oropharyngeal exudate, posterior oropharyngeal erythema or uvula swelling.      Tonsils: No tonsillar abscesses.   Eyes:      Pupils: Pupils are equal, round, and reactive to light.   Neck:      Trachea: Trachea normal.   Cardiovascular:      Rate and Rhythm: Normal rate and regular rhythm.      Heart sounds: Normal heart sounds, S1 normal and S2 normal. No murmur heard.  No friction rub. No gallop.    Pulmonary:      Effort: Pulmonary effort is normal. No respiratory distress.      Breath sounds: Normal breath sounds. No decreased breath sounds, wheezing, rhonchi or rales.   Abdominal:      General: Bowel sounds are normal. There is no distension.      Palpations: Abdomen is soft. Abdomen is not rigid. There is no mass.      Tenderness: There is no abdominal tenderness. There is no guarding or rebound.   Musculoskeletal:      Cervical back: Full passive range of motion without pain, normal range of motion and neck supple.   Lymphadenopathy:      Cervical: No cervical adenopathy.   Skin:     General: Skin is warm and dry.   Neurological:      Mental Status: She is alert and oriented to person, place, and time.      Cranial Nerves: No cranial nerve deficit.      Sensory: Sensation is intact.      Motor: Motor function is intact. No weakness, atrophy or abnormal muscle tone.      Coordination: Coordination is  intact. Romberg sign negative. Coordination normal.      Gait: Gait is intact. Gait normal.      Deep Tendon Reflexes: Reflexes are normal and symmetric.   Psychiatric:         Behavior: Behavior normal. Behavior is cooperative.         Thought Content: Thought content normal.         Judgment: Judgment normal.             Cyril Uribe PA-C  11/20/2021, 2:10 PM

## 2021-11-26 ENCOUNTER — NURSE TRIAGE (OUTPATIENT)
Dept: NURSING | Facility: CLINIC | Age: 81
End: 2021-11-26
Payer: COMMERCIAL

## 2021-11-26 DIAGNOSIS — J01.90 ACUTE SINUSITIS WITH SYMPTOMS > 10 DAYS: Primary | ICD-10-CM

## 2021-11-26 DIAGNOSIS — H81.10 BENIGN PAROXYSMAL POSITIONAL VERTIGO, UNSPECIFIED LATERALITY: ICD-10-CM

## 2021-11-26 RX ORDER — AZITHROMYCIN 250 MG/1
TABLET, FILM COATED ORAL
Qty: 6 TABLET | Refills: 0 | Status: SHIPPED | OUTPATIENT
Start: 2021-11-26 | End: 2021-12-01

## 2021-11-26 NOTE — TELEPHONE ENCOUNTER
"Pt reports being seen in [facility] last Saturday, 11/20/21, for sinusitis and vertigo.  Pt was started on doxycycline.   Pt reports on Wednesday, 11/24/21, with morning dose taken on empty stomach, she got \"sicker than a dog\" within an hour of taking it.   Pt admitted further into conversation with RN, to sticking fingers down throat to force emesis.   Pt reports she tolerated Wed 11/24/21, evening dose.   Then when pt took dose again on Thursday evening, 11/25/21, she became sick again and manually produced vomiting. Pt reports her tummy was full of thanksgiving dinner, so not on an empty stomach that time.   Pt reports vertigo still present off and on with position changes and movement.  Pt reports headache above the eyes, tylenol helps some.   Pt states she has only taken about 3 days worth of doxycycline. Using loratadine as well.   No fever.   Clear nasal drainage.   Pt reports diarrhea began Wednesday too x5 stools of loose and watery consistency, and Thursday night x3, loose/watery diarrhea stools.   Eating and drinking as usual. Urinating as usual.  No abdominal pain. No head injury.     Sinus symptoms have not improved at all since seen in [facility]. Pt is stopping doxycycline, but is wondering if new antibiotic can be called in and if she should see PT for vertigo as in past.     Pt requesting message be routed to Dr Herrera Deal with above questions.    Pt can be reached at [phone]    [facility], [address] [place] [address] 10    Per RN Triage protocol, message routed to PCP for advice. Caller given care advice per RN triage protocol. Caller verbalizes understanding and agreement of plan.    Masha Hubbard RN   11/26/21 11:37 AM  [facility] Nurse Advisor      Reason for Disposition    Vomiting a prescription medication    Additional Information    Negative: Shock suspected (e.g., cold/pale/clammy skin, too weak to stand, low BP, rapid pulse)    Negative: Difficult to awaken or acting confused (e.g., " disoriented, slurred speech)    Negative: Sounds like a life-threatening emergency to the triager    Negative: Vomiting occurs only while coughing    Negative: Pregnant < 20 Weeks and nausea/vomiting began in early pregnancy (i.e., 4-8 weeks pregnant)    Negative: Chest pain    Negative: Headache is main symptom    Negative: SEVERE vomiting (e.g., 6 or more times/day) (Exception: patient sounds well, is drinking liquids, does not sound dehydrated, and vomiting has lasted less than 24 hours)    Negative: MODERATE vomiting (e.g., 3 - 5 times/day) and age > 60    Negative: Vomiting contains bile (green color)    Negative: Vomiting red blood or black (coffee ground) material    Negative: Insulin-dependent diabetes and glucose > 240 mg/dL (13 mmol/L)    Negative: Recent head injury (within 3 days)    Negative: Recent abdominal injury (within 7 days)    Negative: Drinking very little and has signs of dehydration (e.g., no urine > 12 hours, very dry mouth, very lightheaded)    Negative: Constant abdominal pain lasting > 2 hours    Negative: High-risk adult (e.g., brain tumor, V-P shunt, hernia)    Negative: Severe pain in one eye    Negative: Patient sounds very sick or weak to the triager    Negative: Vomiting and abdomen looks much more swollen than usual    Negative: Fever > 103 F (39.4 C)    Negative: Fever > 101 F (38.3 C) and over 60 years of age    Negative: Fever > 100.0 F (37.8 C) and has a weak immune system (e.g., HIV positive, cancer chemo, organ transplant, splenectomy, chronic steroids)    Negative: Fever > 100.0 F (37.8 C) and bedridden (e.g., nursing home patient, stroke, chronic illness, recovering from surgery)    Negative: Taking any of the following medications: digoxin (Lanoxin), lithium, theophylline, phenytoin (Dilantin)    Negative: SEVERE headache and vomiting    Negative: MILD to MODERATE vomiting (e.g., 1-5 times/day) and lasts > 48 hours (2 days)    Negative: Fever present > 3 days (72 hours)     "Negative: Patient wants to be seen    Protocols used: VOMITING-A-OH    COVID 19 Nurse Triage Plan/Patient Instructions    Please be aware that novel coronavirus (COVID-19) may be circulating in the community. If you develop symptoms such as fever, cough, or SOB or if you have concerns about the presence of another infection including coronavirus (COVID-19), please contact your health care provider or visit [email or web address].     Disposition/Instructions    Additional COVID19 information to add for patients.   How can I protect others?  If you have symptoms (fever, cough, body aches or trouble breathing): Stay home and away from others (self-isolate) until:    At least 10 days have passed since your symptoms started, And     You ve had no fever--and no medicine that reduces fever--for 1 full day (24 hours), And      Your other symptoms have resolved (gotten better).     If you don t have symptoms, but a test showed that you have COVID-19 (you tested positive):    Stay home and away from others (self-isolate). Follow the tips under \"How do I self-isolate?\" below for 10 days (20 days if you have a weak immune system).    You don't need to be retested for COVID-19 before going back to school or work. As long as you're fever-free and feeling better, you can go back to school, work and other activities after waiting the 10 or 20 days.     How do I self-isolate?    Stay in your own room, even for meals. Use your own bathroom if you can.     Stay away from others in your home. No hugging, kissing or shaking hands. No visitors.    Don t go to work, school or anywhere else.     Clean  high touch  surfaces often (doorknobs, counters, handles, etc.). Use a household cleaning spray or wipes. You ll find a full list on the EPA website:  www.epa.gov/pesticide-registration/list-n-disinfectants-use-against-sars-cov-2.    Cover your mouth and nose with a mask, tissue or washcloth to avoid spreading germs.    Wash your " hands and face often. Use soap and water.    Caregivers in these groups are at risk for severe illness due to COVID-19:  o People 65 years and older  o People who live in a nursing home or long-term care facility  o People with chronic disease (lung, heart, cancer, diabetes, kidney, liver, immunologic)  o People who have a weakened immune system, including those who:  - Are in cancer treatment  - Take medicine that weakens the immune system, such as corticosteroids  - Had a bone marrow or organ transplant  - Have an immune deficiency  - Have poorly controlled HIV or AIDS  - Are obese (body mass index of 40 or higher)  - Smoke regularly    Caregivers should wear gloves while washing dishes, handling laundry and cleaning bedrooms and bathrooms.    Use caution when washing and drying laundry: Don t shake dirty laundry, and use the warmest water setting that you can.    For more tips, go to www.cdc.gov/coronavirus/2019-ncov/downloads/10Things.pdf.    How can I take care of myself?  1. Get lots of rest. Drink extra fluids (unless a doctor has told you not to).     2. Take Tylenol (acetaminophen) for fever or pain. If you have liver or kidney problems, ask your family doctor if it s okay to take Tylenol.     Adults can take either:     650 mg (two 325 mg pills) every 4 to 6 hours, or     1,000 mg (two 500 mg pills) every 8 hours as needed.     Note: Don t take more than 3,000 mg in one day.   Acetaminophen is found in many medicines (both prescribed and over-the-counter medicines). Read all labels to be sure you don t take too much.     For children, check the Tylenol bottle for the right dose. The dose is based on the child s age or weight.    3. If you have other health problems (like cancer, heart failure, an organ transplant or severe kidney disease): Call your specialty clinic if you don t feel better in the next 2 days.    4. Know when to call 911: Emergency warning signs include:    Trouble breathing or shortness  of breath    Pain or pressure in the chest that doesn t go away    Feeling confused like you haven t felt before, or not being able to wake up    Bluish-colored lips or face    What are the symptoms of COVID-19?     The most common symptoms are cough, fever and trouble breathing.     Less common symptoms include body aches, chills, diarrhea (loose, watery poops), fatigue (feeling very tired), headache, runny nose, sore throat and loss of smell.    COVID-19 can cause severe coughing (bronchitis) and lung infection (pneumonia).    How does it spread?     The virus may spread when a person coughs or sneezes into the air. The virus can travel about 6 feet this way, and it can live on surfaces.      Common  (household disinfectants) will kill the virus.    Who is at risk?  Anyone can catch COVID-19 if they re around someone who has the virus.    How can others protect themselves?     Stay away from people who have COVID-19 (or symptoms of COVID-19).    Wash hands often with soap and water. Or, use hand  with at least 60% alcohol.    Avoid touching the eyes, nose or mouth.     Wear a face mask when you go out in public, when sick or when caring for a sick person.    Where can I get more information?    [facility]: About COVID-19: [email or web address]    CDC: What to Do If You re Sick: www.cdc.gov/coronavirus/2019-ncov/about/steps-when-sick.html    CDC: Ending Home Isolation: www.cdc.gov/coronavirus/2019-ncov/hcp/disposition-in-home-patients.html     CDC: Caring for Someone: www.cdc.gov/coronavirus/2019-ncov/if-you-are-sick/care-for-someone.html     [facility]: Interim Guidance for Hospital Discharge to Home: www.health.[facility].mn.us/diseases/coronavirus/hcp/hospdischarge.pdf    [facility] clinical trials (COVID-19 research studies): clinicalaffairs.[facility]-clinical-trials     Below are the COVID-19 hotlines at the [place] Department of [employer] ([facility]). Interpreters are available.    o For health questions: Call 044-412-9231 or 1-980.421.3065 (7 a.m. to 7 p.m.)  o For questions about schools and childcare: Call 464-380-3823 or 1-226.613.5570 (7 a.m. to 7 p.m.)          Thank you for taking steps to prevent the spread of this virus.  o Limit your contact with others.  o Wear a simple mask to cover your cough.  o Wash your hands well and often.    Resources    [facility]: About COVID-19: [email or web address]    CDC: What to Do If You're Sick: www.cdc.gov/coronavirus/2019-ncov/about/steps-when-sick.html    CDC: Ending Home Isolation: www.cdc.gov/coronavirus/2019-ncov/hcp/disposition-in-home-patients.html     CDC: Caring for Someone: www.cdc.gov/coronavirus/2019-ncov/if-you-are-sick/care-for-someone.html     [facility]: Interim Guidance for Hospital Discharge to Home: www.[facility].mn.us/diseases/coronavirus/hcp/hospdischarge.pdf    [facility] clinical trials (COVID-19 research studies): clinicalaffairs.[facility]-clinical-trials     Below are the COVID-19 hotlines at the [place] Department of [facility] ([facility]). Interpreters are available.   o For health questions: Call 122-838-8444 or 1-888.492.2309 (7 a.m. to 7 p.m.)  o For questions about schools and childcare: Call 594-027-7261 or 1-253.606.9547 (7 a.m. to 7 p.m.)

## 2021-11-26 NOTE — TELEPHONE ENCOUNTER
Stop doxycyline    Start azithromycin instead ( sent in prescription )    If needed see phys therapy for vertigo ( I did referral )    Follow up prn symptoms in clinic    Please inform patient    Javier Jones MD

## 2021-11-26 NOTE — TELEPHONE ENCOUNTER
Patient notified of Provider's message as written.  Gave phone number from PT referral.  Patient asked if it would be ok to get the influenza and COVID19 shots.  Advised that she wait til she is no longer ill and no fever.  Patient verbalized understanding.    Justo Llamas RN  [facility]

## 2021-12-09 ENCOUNTER — OFFICE VISIT (OUTPATIENT)
Dept: FAMILY MEDICINE | Facility: CLINIC | Age: 81
End: 2021-12-09
Payer: COMMERCIAL

## 2021-12-09 VITALS
BODY MASS INDEX: 28.36 KG/M2 | OXYGEN SATURATION: 96 % | TEMPERATURE: 97.7 F | DIASTOLIC BLOOD PRESSURE: 62 MMHG | WEIGHT: 155.03 LBS | SYSTOLIC BLOOD PRESSURE: 121 MMHG | HEART RATE: 87 BPM

## 2021-12-09 DIAGNOSIS — H81.10 BENIGN PAROXYSMAL POSITIONAL VERTIGO, UNSPECIFIED LATERALITY: ICD-10-CM

## 2021-12-09 DIAGNOSIS — K21.9 GASTROESOPHAGEAL REFLUX DISEASE WITHOUT ESOPHAGITIS: Primary | ICD-10-CM

## 2021-12-09 DIAGNOSIS — J01.90 ACUTE NON-RECURRENT SINUSITIS, UNSPECIFIED LOCATION: ICD-10-CM

## 2021-12-09 DIAGNOSIS — K21.9 GASTROESOPHAGEAL REFLUX DISEASE WITHOUT ESOPHAGITIS: ICD-10-CM

## 2021-12-09 LAB
BASOPHILS # BLD AUTO: 0 10E3/UL (ref 0–0.2)
BASOPHILS NFR BLD AUTO: 0 %
EOSINOPHIL # BLD AUTO: 0.2 10E3/UL (ref 0–0.7)
EOSINOPHIL NFR BLD AUTO: 4 %
ERYTHROCYTE [DISTWIDTH] IN BLOOD BY AUTOMATED COUNT: 13.8 % (ref 10–15)
HCT VFR BLD AUTO: 43.2 % (ref 35–47)
HGB BLD-MCNC: 13.9 G/DL (ref 11.7–15.7)
LYMPHOCYTES # BLD AUTO: 2 10E3/UL (ref 0.8–5.3)
LYMPHOCYTES NFR BLD AUTO: 35 %
MCH RBC QN AUTO: 31.5 PG (ref 26.5–33)
MCHC RBC AUTO-ENTMCNC: 32.2 G/DL (ref 31.5–36.5)
MCV RBC AUTO: 98 FL (ref 78–100)
MONOCYTES # BLD AUTO: 0.7 10E3/UL (ref 0–1.3)
MONOCYTES NFR BLD AUTO: 13 %
NEUTROPHILS # BLD AUTO: 2.7 10E3/UL (ref 1.6–8.3)
NEUTROPHILS NFR BLD AUTO: 48 %
PLATELET # BLD AUTO: 198 10E3/UL (ref 150–450)
RBC # BLD AUTO: 4.41 10E6/UL (ref 3.8–5.2)
WBC # BLD AUTO: 5.6 10E3/UL (ref 4–11)

## 2021-12-09 PROCEDURE — 85025 COMPLETE CBC W/AUTO DIFF WBC: CPT | Performed by: FAMILY MEDICINE

## 2021-12-09 PROCEDURE — 83690 ASSAY OF LIPASE: CPT | Performed by: FAMILY MEDICINE

## 2021-12-09 PROCEDURE — 99214 OFFICE O/P EST MOD 30 MIN: CPT | Performed by: FAMILY MEDICINE

## 2021-12-09 PROCEDURE — 82150 ASSAY OF AMYLASE: CPT | Performed by: FAMILY MEDICINE

## 2021-12-09 PROCEDURE — 36415 COLL VENOUS BLD VENIPUNCTURE: CPT | Performed by: FAMILY MEDICINE

## 2021-12-09 PROCEDURE — 80053 COMPREHEN METABOLIC PANEL: CPT | Performed by: FAMILY MEDICINE

## 2021-12-09 RX ORDER — MECLIZINE HYDROCHLORIDE 25 MG/1
25 TABLET ORAL 3 TIMES DAILY PRN
Qty: 30 TABLET | Refills: 2 | Status: SHIPPED | OUTPATIENT
Start: 2021-12-09 | End: 2024-06-14

## 2021-12-09 ASSESSMENT — PAIN SCALES - GENERAL: PAINLEVEL: NO PAIN (0)

## 2021-12-09 NOTE — LETTER
December 14, 2021    Hemalatha Handy  [address]  [place] [facility] [address]          Dear ,    We are writing to inform you of your test results.    Your lab results are completely normal at this time.  Please let me know if you have any questions.       Resulted Orders   Amylase   Result Value Ref Range    Amylase 52 30 - 110 U/L   Lipase   Result Value Ref Range    Lipase 122 73 - 393 U/L   Comprehensive metabolic panel (BMP + Alb, Alk Phos, ALT, AST, Total. Bili, TP)   Result Value Ref Range    Sodium 139 133 - 144 mmol/L    Potassium 4.2 3.4 - 5.3 mmol/L    Chloride 107 94 - 109 mmol/L    Carbon Dioxide (CO2) 30 20 - 32 mmol/L    Anion Gap 2 (L) 3 - 14 mmol/L    Urea Nitrogen 20 7 - 30 mg/dL    Creatinine 0.64 0.52 - 1.04 mg/dL    Calcium 8.9 8.5 - 10.1 mg/dL    Glucose 95 70 - 99 mg/dL    Alkaline Phosphatase 90 40 - 150 U/L    AST 27 0 - 45 U/L    ALT 34 0 - 50 U/L    Protein Total 7.3 6.8 - 8.8 g/dL    Albumin 3.9 3.4 - 5.0 g/dL    Bilirubin Total 0.3 0.2 - 1.3 mg/dL    GFR Estimate 84 >60 mL/min/1.73m2      Comment:      As of July 11, 2021, eGFR is calculated by the CKD-EPI creatinine equation, without race adjustment. eGFR can be influenced by muscle mass, exercise, and diet. The reported eGFR is an estimation only and is only applicable if the renal function is stable.   CBC with platelets and differential   Result Value Ref Range    WBC Count 5.6 4.0 - 11.0 10e3/uL    RBC Count 4.41 3.80 - 5.20 10e6/uL    Hemoglobin 13.9 11.7 - 15.7 g/dL    Hematocrit 43.2 35.0 - 47.0 %    MCV 98 78 - 100 fL    MCH 31.5 26.5 - 33.0 pg    MCHC 32.2 31.5 - 36.5 g/dL    RDW 13.8 10.0 - 15.0 %    Platelet Count 198 150 - 450 10e3/uL    % Neutrophils 48 %    % Lymphocytes 35 %    % Monocytes 13 %    % Eosinophils 4 %    % Basophils 0 %    Absolute Neutrophils 2.7 1.6 - 8.3 10e3/uL    Absolute Lymphocytes 2.0 0.8 - 5.3 10e3/uL    Absolute Monocytes 0.7 0.0 - 1.3 10e3/uL    Absolute Eosinophils 0.2  0.0 - 0.7 10e3/uL    Absolute Basophils 0.0 0.0 - 0.2 10e3/uL       If you have any questions or concerns, please call the clinic at the number listed above.       Sincerely,      Shamar Canas MD

## 2021-12-09 NOTE — PROGRESS NOTES
Assessment & Plan       ICD-10-CM    1. Gastroesophageal reflux disease without esophagitis  K21.9 DISCONTINUED: omeprazole (PRILOSEC) 20 MG DR capsule   2. Benign paroxysmal positional vertigo, unspecified laterality  H81.10 Otolaryngology Referral     Comprehensive metabolic panel (BMP + Alb, Alk Phos, ALT, AST, Total. Bili, TP)     CBC with platelets and differential     Lipase     Amylase     meclizine (ANTIVERT) 25 MG tablet     Amylase     Lipase     CBC with platelets and differential     Comprehensive metabolic panel (BMP + Alb, Alk Phos, ALT, AST, Total. Bili, TP)   3. Acute non-recurrent sinusitis, unspecified location  J01.90 CBC with platelets and differential     CBC with platelets and differential         Review of external notes as documented elsewhere in note      Return in about 1 week (around 12/16/2021) for primary physician.    Shamar Zheng MD  [facility] GWEN Penny is a 81 year old who presents for the following health issues     HPI     ED/UC Followup:    Facility:  [facility] [place]  Date of visit: 11/20/21  Reason for visit: Acute sinusitis with coexisting condition, vertigo  Current Status: Feeling is feeling weak, unsteady on feet, head is feeling cloudy. Continues to have drainage from the nose, intermittent ear pain and has episodes of sweating and nausea. Vertigo episodes as well. Took antibiotic bid, got sick from this and quit. Took a course of Zithromax after and neither of these helped her symptoms.     Patient was treated for sinusitis on 11/20  Doxycycline which gave her nausea/diarrhea so she stopped  Switched to azithromycin  Sinusitis symptoms have improved  Nasal drainage, clear, less sinus pressure  She has had some light headedness/dizziness throughout course        Review of Systems   Constitutional, HEENT, cardiovascular, pulmonary, gi and gu systems are negative, except as otherwise noted.      Objective    BP  121/62 (BP Location: Right arm, Patient Position: Chair, Cuff Size: Adult Regular)   Pulse 87   Temp 97.7  F (36.5  C) (Oral)   Wt 70.3 kg (155 lb 0.5 oz)   SpO2 96%   BMI 28.36 kg/m    Body mass index is 28.36 kg/m .  Physical Exam   GENERAL: healthy, alert and no distress  EYES: Eyes grossly normal to inspection, PERRL and conjunctivae and sclerae normal  HENT: ear canals and TM's normal, nose and mouth without ulcers or lesions  RESP: lungs clear to auscultation - no rales, rhonchi or wheezes  CV: regular rate and rhythm, normal S1 S2, no S3 or S4, no murmur, click or rub, no peripheral edema and peripheral pulses strong  SKIN: no suspicious lesions or rashes  PSYCH: mentation appears normal, affect normal/bright

## 2021-12-09 NOTE — LETTER
December 14, 2021    Hemalatha Handy  [address] [facility] [address]  [place] [address]          Dear ,    We are writing to inform you of your test results.    Your results are normal thus far.  I'll let you know what the rest of your labs show when the results come back to me.       Resulted Orders   Amylase   Result Value Ref Range    Amylase 52 30 - 110 U/L   Lipase   Result Value Ref Range    Lipase 122 73 - 393 U/L   Comprehensive metabolic panel (BMP + Alb, Alk Phos, ALT, AST, Total. Bili, TP)   Result Value Ref Range    Sodium 139 133 - 144 mmol/L    Potassium 4.2 3.4 - 5.3 mmol/L    Chloride 107 94 - 109 mmol/L    Carbon Dioxide (CO2) 30 20 - 32 mmol/L    Anion Gap 2 (L) 3 - 14 mmol/L    Urea Nitrogen 20 7 - 30 mg/dL    Creatinine 0.64 0.52 - 1.04 mg/dL    Calcium 8.9 8.5 - 10.1 mg/dL    Glucose 95 70 - 99 mg/dL    Alkaline Phosphatase 90 40 - 150 U/L    AST 27 0 - 45 U/L    ALT 34 0 - 50 U/L    Protein Total 7.3 6.8 - 8.8 g/dL    Albumin 3.9 3.4 - 5.0 g/dL    Bilirubin Total 0.3 0.2 - 1.3 mg/dL    GFR Estimate 84 >60 mL/min/1.73m2      Comment:      As of July 11, 2021, eGFR is calculated by the CKD-EPI creatinine equation, without race adjustment. eGFR can be influenced by muscle mass, exercise, and diet. The reported eGFR is an estimation only and is only applicable if the renal function is stable.   CBC with platelets and differential   Result Value Ref Range    WBC Count 5.6 4.0 - 11.0 10e3/uL    RBC Count 4.41 3.80 - 5.20 10e6/uL    Hemoglobin 13.9 11.7 - 15.7 g/dL    Hematocrit 43.2 35.0 - 47.0 %    MCV 98 78 - 100 fL    MCH 31.5 26.5 - 33.0 pg    MCHC 32.2 31.5 - 36.5 g/dL    RDW 13.8 10.0 - 15.0 %    Platelet Count 198 150 - 450 10e3/uL    % Neutrophils 48 %    % Lymphocytes 35 %    % Monocytes 13 %    % Eosinophils 4 %    % Basophils 0 %    Absolute Neutrophils 2.7 1.6 - 8.3 10e3/uL    Absolute Lymphocytes 2.0 0.8 - 5.3 10e3/uL    Absolute Monocytes 0.7 0.0 - 1.3 10e3/uL     Absolute Eosinophils 0.2 0.0 - 0.7 10e3/uL    Absolute Basophils 0.0 0.0 - 0.2 10e3/uL       If you have any questions or concerns, please call the clinic at the number listed above.       Sincerely,      Shamar Canas MD

## 2021-12-10 DIAGNOSIS — I10 HYPERTENSION GOAL BP (BLOOD PRESSURE) < 140/90: ICD-10-CM

## 2021-12-10 LAB
ALBUMIN SERPL-MCNC: 3.9 G/DL (ref 3.4–5)
ALP SERPL-CCNC: 90 U/L (ref 40–150)
ALT SERPL W P-5'-P-CCNC: 34 U/L (ref 0–50)
AMYLASE SERPL-CCNC: 52 U/L (ref 30–110)
ANION GAP SERPL CALCULATED.3IONS-SCNC: 2 MMOL/L (ref 3–14)
AST SERPL W P-5'-P-CCNC: 27 U/L (ref 0–45)
BILIRUB SERPL-MCNC: 0.3 MG/DL (ref 0.2–1.3)
BUN SERPL-MCNC: 20 MG/DL (ref 7–30)
CALCIUM SERPL-MCNC: 8.9 MG/DL (ref 8.5–10.1)
CHLORIDE BLD-SCNC: 107 MMOL/L (ref 94–109)
CO2 SERPL-SCNC: 30 MMOL/L (ref 20–32)
CREAT SERPL-MCNC: 0.64 MG/DL (ref 0.52–1.04)
GFR SERPL CREATININE-BSD FRML MDRD: 84 ML/MIN/1.73M2
GLUCOSE BLD-MCNC: 95 MG/DL (ref 70–99)
LIPASE SERPL-CCNC: 122 U/L (ref 73–393)
POTASSIUM BLD-SCNC: 4.2 MMOL/L (ref 3.4–5.3)
PROT SERPL-MCNC: 7.3 G/DL (ref 6.8–8.8)
SODIUM SERPL-SCNC: 139 MMOL/L (ref 133–144)

## 2021-12-12 RX ORDER — LISINOPRIL 10 MG/1
TABLET ORAL
Qty: 90 TABLET | Refills: 0 | Status: SHIPPED | OUTPATIENT
Start: 2021-12-12 | End: 2022-05-10

## 2021-12-13 ENCOUNTER — IMMUNIZATION (OUTPATIENT)
Dept: NURSING | Facility: CLINIC | Age: 81
End: 2021-12-13
Payer: COMMERCIAL

## 2021-12-13 ENCOUNTER — TRANSFERRED RECORDS (OUTPATIENT)
Dept: HEALTH INFORMATION MANAGEMENT | Facility: CLINIC | Age: 81
End: 2021-12-13

## 2021-12-13 DIAGNOSIS — Z23 HIGH PRIORITY FOR 2019-NCOV VACCINE: Primary | ICD-10-CM

## 2021-12-13 DIAGNOSIS — Z23 NEED FOR PROPHYLACTIC VACCINATION AND INOCULATION AGAINST INFLUENZA: ICD-10-CM

## 2021-12-13 PROCEDURE — 0064A COVID-19,PF,MODERNA (18+ YRS BOOSTER .25ML): CPT

## 2021-12-13 PROCEDURE — G0008 ADMIN INFLUENZA VIRUS VAC: HCPCS

## 2021-12-13 PROCEDURE — 99207 PR NO CHARGE LOS: CPT

## 2021-12-13 PROCEDURE — 91306 COVID-19,PF,MODERNA (18+ YRS BOOSTER .25ML): CPT

## 2021-12-13 PROCEDURE — 90662 IIV NO PRSV INCREASED AG IM: CPT

## 2021-12-23 ENCOUNTER — NURSE TRIAGE (OUTPATIENT)
Dept: NURSING | Facility: CLINIC | Age: 81
End: 2021-12-23
Payer: COMMERCIAL

## 2021-12-24 NOTE — TELEPHONE ENCOUNTER
12/18 was at a party and today finds out the host of the party has tested positive for covid.  Hemalatha currently is asymptotic.  Has received the Moderna vaccine and the booster.     Francia Banks RN, MA  [facility] Triage Nurse Advisor      Reason for Disposition    [1] CLOSE CONTACT COVID-19 EXPOSURE within last 14 days AND [2] NO symptoms    Protocols used: CORONAVIRUS (COVID-19) EXPOSURE-A- 8.25.2021    COVID 19 Nurse Triage Plan/Patient Instructions    Please be aware that novel coronavirus (COVID-19) may be circulating in the community. If you develop symptoms such as fever, cough, or SOB or if you have concerns about the presence of another infection including coronavirus (COVID-19), please contact your health care provider or visit [email or web address].     Disposition/Instructions    Home care recommended. Follow home care protocol based instructions.    Thank you for taking steps to prevent the spread of this virus.  o Limit your contact with others.  o Wear a simple mask to cover your cough.  o Wash your hands well and often.    Resources    [facility]: About COVID-19: [email or web address]    CDC: What to Do If You're Sick: www.cdc.gov/coronavirus/2019-ncov/about/steps-when-sick.html    CDC: Ending Home Isolation: www.cdc.gov/coronavirus/2019-ncov/hcp/disposition-in-home-patients.html     CDC: Caring for Someone: www.cdc.gov/coronavirus/2019-ncov/if-you-are-sick/care-for-someone.html     [facility]: Interim Guidance for Hospital Discharge to Home: www.health.[facility].mn.us/diseases/coronavirus/hcp/hospdischarge.pdf    [facility] clinical trials (COVID-19 research studies): clinicalaffairs.[facility].edu/um-clinical-trials     Below are the COVID-19 hotlines at the [place] Department of [employer] ([facility]). Interpreters are available.   o For health questions: Call 214-738-9498 or 1-483.519.6790 (7 a.m. to 7 p.m.)  o For questions about schools and childcare: Call 384-601-7104 or  8-424-866-7006 (7 a.m. to 7 p.m.)

## 2022-03-08 ENCOUNTER — OFFICE VISIT (OUTPATIENT)
Dept: OTHER | Facility: CLINIC | Age: 82
End: 2022-03-08
Payer: COMMERCIAL

## 2022-03-08 VITALS
HEART RATE: 80 BPM | DIASTOLIC BLOOD PRESSURE: 88 MMHG | TEMPERATURE: 98 F | RESPIRATION RATE: 16 BRPM | OXYGEN SATURATION: 93 % | WEIGHT: 156 LBS | SYSTOLIC BLOOD PRESSURE: 140 MMHG | HEIGHT: 61 IN | BODY MASS INDEX: 29.45 KG/M2

## 2022-03-08 DIAGNOSIS — Z00.00 ENCOUNTER FOR MEDICARE ANNUAL WELLNESS EXAM: Primary | ICD-10-CM

## 2022-03-08 PROCEDURE — G0438 PPPS, INITIAL VISIT: HCPCS | Performed by: FAMILY MEDICINE

## 2022-03-08 ASSESSMENT — PAIN SCALES - GENERAL: PAINLEVEL: NO PAIN (0)

## 2022-03-08 ASSESSMENT — ACTIVITIES OF DAILY LIVING (ADL): CURRENT_FUNCTION: NO ASSISTANCE NEEDED

## 2022-03-08 NOTE — PATIENT INSTRUCTIONS
Patient Education   Personalized Prevention Plan  You are due for the preventive services outlined below.  Your care team is available to assist you in scheduling these services.  If you have already completed any of these items, please share that information with your care team to update in your medical record.  Health Maintenance Due   Topic Date Due     Zoster (Shingles) Vaccine (2 of 3) 05/13/2016     Osteoporosis Screening  05/08/2017     Cholesterol Lab  02/05/2022     FALL RISK ASSESSMENT  02/05/2022

## 2022-03-08 NOTE — PROGRESS NOTES
"Assessment & Plan     ICD-10-CM    1. Encounter for Medicare annual wellness exam  Z00.00        Follow Up/Next Steps    Return in about 53 weeks (around 3/14/2023) for Annual Wellness Visit.  Recommended that patient follow up with PCP to address the following : It's been a year since patient saw her PCP per patient. Patient c/o abdominal discomfort. Per patient her PCP aware of it. Patient's /88. Patient is on BP medicaiton. Overdue for zoster immunization, DEXA , Lipid . Writer made a follow up appt. with PCP for March 24. Continue to follow up with PCP for chronic conditions.    Counseling and Education  Reviewed preventive health counseling, as reflected in patient instructions      BMI:   Estimated body mass index is 29.48 kg/m  as calculated from the following:    Height as of this encounter: 1.549 m (5' 1\").    Weight as of this encounter: 70.8 kg (156 lb).   Weight management plan: Discussed healthy diet and exercise guidelines      Appropriate preventive services were discussed with this patient, including applicable screening as appropriate for cardiovascular disease, diabetes, osteopenia/osteoporosis, and glaucoma.  As appropriate for age/gender, discussed screening for colorectal cancer, prostate cancer, breast cancer, and cervical cancer. Checklist reviewing preventive services available has been given to the patient.    Reviewed patients plan of care and provided an AVS. The Basic Care Plan (routine screening as documented in Health Maintenance) for Hemalatha meets the Care Plan requirement. This Care Plan has been established and reviewed with the Patient.    Visit Provider: Esmer Oleary RN  Supervising Provider: Leena Celestin MD, MD  [facility]       Subjective   Hemalatha is a 81 year old who is being seen for an Annual Wellness Visit  accompanied by her none.    Healthy Habits:     In general, how would you rate your overall health?  Good    " "Frequency of exercise:  6-7 days/week    Duration of exercise:  15-30 minutes    Do you usually eat at least 4 servings of fruit and vegetables a day, include whole grains    & fiber and avoid regularly eating high fat or \"junk\" foods?  Yes    Taking medications regularly:  Yes    Medication side effects:  None    Ability to successfully perform activities of daily living:  No assistance needed    Home Safety:  Lack of grab bars in the bathroom    Hearing Impairment:  Need to ask people to speak up or repeat themselves    In the past 6 months, have you been bothered by leaking of urine?  No    In general, how would you rate your overall mental or emotional health?  Good      PHQ-2 Total Score: 0    Additional concerns today:  No    Do you feel safe in your environment? Yes    Have you ever done Advance Care Planning? (For example, a Health Directive, POLST, or a discussion with a medical provider or your loved ones about your wishes): No, advance care planning information given to patient to review.  Advanced care planning was discussed at today's visit.    Fall risk  Fallen 2 or more times in the past year?: No  Any fall with injury in the past year?: No  Cognitive Screening   1) Repeat 3 items (Leader, Season, Table)    2) Clock draw: NORMAL  3) 3 item recall: Recalls 3 objects  Results: NORMAL clock, 1-2 items recalled: COGNITIVE IMPAIRMENT LESS LIKELY    Mini-CogTM Copyright NELIDA Iqbal. Licensed by the author for use in [facility]; reprinted with permission ([email or web address]@.[email or web address]). All rights reserved.      Do you have sleep apnea, excessive snoring or daytime drowsiness?: no    Reviewed and updated as needed this visit by clinical staff    Allergies  Meds  Problems             Social History     Tobacco Use     Smoking status: Former Smoker     Quit date: 2005     Years since quittin.1     Smokeless tobacco: Never Used   Substance Use Topics     Alcohol use: Yes       Current providers sharing in " "care for this patient include:   Patient Care Team:  Javier Jones MD as PCP - General  Javier Jones MD as Assigned PCP    The following health maintenance items are reviewed in Epic and correct as of today:  Health Maintenance Due   Topic Date Due     ZOSTER IMMUNIZATION (2 of 3) 05/13/2016     DEXA  05/08/2017     LIPID  02/05/2022     FALL RISK ASSESSMENT  02/05/2022       Patient states her doctor said she doesn't need anymore.  Pertinent mammograms are reviewed under the imaging tab.        Objective    BP (!) 140/88 (BP Location: Left arm, Patient Position: Sitting, Cuff Size: Adult Regular)   Pulse 80   Temp 98  F (36.7  C) (Temporal)   Resp 16   Ht 1.549 m (5' 1\")   Wt 70.8 kg (156 lb)   SpO2 93%   BMI 29.48 kg/m   Estimated body mass index is 29.48 kg/m  as calculated from the following:    Height as of this encounter: 1.549 m (5' 1\").    Weight as of this encounter: 70.8 kg (156 lb).  Physical Exam  Patient appears comfortable and in no acute distress.        Identified Health Risks:  "

## 2022-03-10 DIAGNOSIS — F41.9 ANXIETY: ICD-10-CM

## 2022-03-10 DIAGNOSIS — I10 HYPERTENSION GOAL BP (BLOOD PRESSURE) < 140/90: ICD-10-CM

## 2022-03-11 ENCOUNTER — OFFICE VISIT (OUTPATIENT)
Dept: URGENT CARE | Facility: URGENT CARE | Age: 82
End: 2022-03-11
Payer: COMMERCIAL

## 2022-03-11 VITALS
OXYGEN SATURATION: 95 % | RESPIRATION RATE: 14 BRPM | SYSTOLIC BLOOD PRESSURE: 130 MMHG | TEMPERATURE: 98 F | DIASTOLIC BLOOD PRESSURE: 62 MMHG | WEIGHT: 156 LBS | HEART RATE: 87 BPM | BODY MASS INDEX: 29.48 KG/M2

## 2022-03-11 DIAGNOSIS — N94.9 GENITAL LESION, FEMALE: ICD-10-CM

## 2022-03-11 DIAGNOSIS — R30.0 DYSURIA: Primary | ICD-10-CM

## 2022-03-11 LAB
ALBUMIN UR-MCNC: NEGATIVE MG/DL
APPEARANCE UR: CLEAR
BILIRUB UR QL STRIP: NEGATIVE
COLOR UR AUTO: YELLOW
GLUCOSE UR STRIP-MCNC: NEGATIVE MG/DL
HGB UR QL STRIP: NEGATIVE
KETONES UR STRIP-MCNC: NEGATIVE MG/DL
LEUKOCYTE ESTERASE UR QL STRIP: NEGATIVE
NITRATE UR QL: NEGATIVE
PH UR STRIP: 5.5 [PH] (ref 5–7)
SP GR UR STRIP: 1.02 (ref 1–1.03)
UROBILINOGEN UR STRIP-ACNC: 0.2 E.U./DL

## 2022-03-11 PROCEDURE — 81003 URINALYSIS AUTO W/O SCOPE: CPT | Performed by: PHYSICIAN ASSISTANT

## 2022-03-11 PROCEDURE — 87086 URINE CULTURE/COLONY COUNT: CPT | Performed by: PHYSICIAN ASSISTANT

## 2022-03-11 PROCEDURE — 99213 OFFICE O/P EST LOW 20 MIN: CPT | Performed by: PHYSICIAN ASSISTANT

## 2022-03-11 RX ORDER — LISINOPRIL 10 MG/1
TABLET ORAL
Qty: 90 TABLET | Refills: 0 | OUTPATIENT
Start: 2022-03-11

## 2022-03-11 RX ORDER — CITALOPRAM HYDROBROMIDE 20 MG/1
TABLET ORAL
Qty: 90 TABLET | Refills: 1 | OUTPATIENT
Start: 2022-03-11

## 2022-03-11 RX ORDER — SULFAMETHOXAZOLE/TRIMETHOPRIM 800-160 MG
1 TABLET ORAL 2 TIMES DAILY
Qty: 14 TABLET | Refills: 0 | Status: SHIPPED | OUTPATIENT
Start: 2022-03-11 | End: 2022-03-18

## 2022-03-11 ASSESSMENT — ENCOUNTER SYMPTOMS
DYSURIA: 1
RESPIRATORY NEGATIVE: 1
ABDOMINAL PAIN: 0
PALPITATIONS: 0
HEARTBURN: 0
SHORTNESS OF BREATH: 0
COUGH: 0
CHILLS: 0
WHEEZING: 0
FATIGUE: 0
HEMATOCHEZIA: 0
HEMATURIA: 0
FREQUENCY: 1
CONSTIPATION: 0
CARDIOVASCULAR NEGATIVE: 1
CHEST TIGHTNESS: 0
FEVER: 0
COLOR CHANGE: 1
FLANK PAIN: 0
DIARRHEA: 0
NAUSEA: 0
GASTROINTESTINAL NEGATIVE: 1
WOUND: 0
VOMITING: 0

## 2022-03-11 NOTE — PROGRESS NOTES
Dorian Penny is a 81 year old who presents for the following health issues   HPI   Genitourinary - Female  Onset/Duration: 1week  Description:   Painful urination (Dysuria): YES           Frequency: YES  Blood in urine (Hematuria): no  Delay in urine (Hesitency): no  Intensity: mild  Progression of Symptoms:  worsening  Accompanying Signs & Symptoms:  Fever/chills: no  Flank pain: no  Nausea and vomiting: no  Vaginal symptoms: reports itching with rash present.  Has been using clotrimazole cream with some relief.  Abdominal/Pelvic Pain: no  History:   History of frequent UTI s: no  History of kidney stones: no  Sexually Active: no  Possibility of pregnancy: No  Precipitating or alleviating factors: None  Therapies tried and outcome: Increase fluid intake with minimal relief     Patient Active Problem List   Diagnosis     Anxiety     Advanced directives, counseling/discussion     GERD (gastroesophageal reflux disease)     Lateral meniscus tear, left, initial encounter     Popliteal cyst, left     Gastroesophageal reflux disease, esophagitis presence not specified     Essential hypertension with goal blood pressure less than 140/90     Current Outpatient Medications   Medication     ALPRAZolam (XANAX) 0.25 MG tablet     chlorhexidine (PERIDEX) 0.12 % solution     citalopram (CELEXA) 20 MG tablet     clotrimazole (LOTRIMIN) 1 % external cream     famotidine (PEPCID) 20 MG tablet     lisinopril (ZESTRIL) 10 MG tablet     meclizine (ANTIVERT) 25 MG tablet     MOTRIN 800 MG OR TABS     omeprazole (PRILOSEC) 20 MG DR capsule     No current facility-administered medications for this visit.        Allergies   Allergen Reactions     Alcohol Hives     Amoxicillin Hives     Asa [Aspirin]      Evista [Raloxifene Hydrochloride]      Prempro      Raloxifene Hives and Rash       Review of Systems   Constitutional: Negative for chills, fatigue and fever.   Respiratory: Negative.  Negative for cough, chest tightness,  shortness of breath and wheezing.    Cardiovascular: Negative.  Negative for chest pain, palpitations and peripheral edema.   Gastrointestinal: Negative.  Negative for abdominal pain, constipation, diarrhea, heartburn, hematochezia, nausea and vomiting.   Genitourinary: Positive for dysuria and frequency. Negative for flank pain, hematuria, pelvic pain, urgency, vaginal bleeding and vaginal discharge.   Skin: Positive for color change and rash. Negative for pallor and wound.   All other systems reviewed and are negative.           Objective    /62 (BP Location: Left arm, Patient Position: Sitting, Cuff Size: Adult Regular)   Pulse 87   Temp 98  F (36.7  C) (Oral)   Resp 14   Wt 70.8 kg (156 lb)   SpO2 95%   BMI 29.48 kg/m    Body mass index is 29.48 kg/m .  Physical Exam  Vitals and nursing note reviewed.   Constitutional:       General: She is not in acute distress.     Appearance: Normal appearance. She is well-developed and normal weight. She is not ill-appearing.   Cardiovascular:      Rate and Rhythm: Normal rate and regular rhythm.      Pulses: Normal pulses.      Heart sounds: Normal heart sounds, S1 normal and S2 normal. No murmur heard.    No friction rub. No gallop.   Pulmonary:      Effort: Pulmonary effort is normal. No accessory muscle usage or respiratory distress.      Breath sounds: Normal breath sounds and air entry. No decreased breath sounds, wheezing, rhonchi or rales.   Abdominal:      General: Abdomen is protuberant. Bowel sounds are normal.      Palpations: Abdomen is soft. There is no hepatomegaly, splenomegaly or mass.      Tenderness: There is no abdominal tenderness. There is no right CVA tenderness, left CVA tenderness, guarding or rebound. Negative signs include Porter's sign, Rovsing's sign, McBurney's sign, psoas sign and obturator sign.      Hernia: No hernia is present.   Genitourinary:     Pubic Area: No rash or pubic lice.       Labia:         Right: No rash,  tenderness, lesion or injury.         Left: Lesion (skin colored nodules present over the L labia majora.  no erythema or discharge) present. No rash, tenderness or injury.       Comments: Declined pelvic exam.  Skin:     General: Skin is warm and dry.   Neurological:      Mental Status: She is alert and oriented to person, place, and time.   Psychiatric:         Mood and Affect: Mood normal.         Behavior: Behavior normal.         Thought Content: Thought content normal.         Judgment: Judgment normal.       Results for orders placed or performed in visit on 03/11/22 (from the past 24 hour(s))   UA Macro with Reflex to Micro and Culture - lab collect    Specimen: Urine, Clean Catch   Result Value Ref Range    Color Urine Yellow Colorless, Straw, Light Yellow, Yellow    Appearance Urine Clear Clear    Glucose Urine Negative Negative mg/dL    Bilirubin Urine Negative Negative    Ketones Urine Negative Negative mg/dL    Specific Gravity Urine 1.025 1.003 - 1.035    Blood Urine Negative Negative    pH Urine 5.5 5.0 - 7.0    Protein Albumin Urine Negative Negative mg/dL    Urobilinogen Urine 0.2 0.2, 1.0 E.U./dL    Nitrite Urine Negative Negative    Leukocyte Esterase Urine Negative Negative    Narrative    Microscopic not indicated         Assessment/Plan:  Dysuria:  H&P is suspicious for UTI and will empirically treat with bactrim DS X7days.  Will send for UC to help guide abx treatment.  Recommend increase fluids, regular voids, proper wiping techniques and voiding after intercourse.  Educated patient on warning signs of kidney infection and to go to the ER if she develops any of these symptoms.  Recheck in clinic if symptoms worsen or if symptoms do not improve.  -     UA Macro with Reflex to Micro and Culture - lab collect; Future  -     UA Macro with Reflex to Micro and Culture - lab collect  -     Urine Culture Aerobic Bacterial - lab collect; Future  -     sulfamethoxazole-trimethoprim (BACTRIM DS) 800-160  MG tablet; Take 1 tablet by mouth 2 times daily for 7 days    Genital lesion, female:  Unclear lesions.  Will send to GYN.  -     Ob/Gyn Referral        Nadeen Rosas PA-C

## 2022-03-14 ENCOUNTER — TELEPHONE (OUTPATIENT)
Dept: URGENT CARE | Facility: URGENT CARE | Age: 82
End: 2022-03-14
Payer: COMMERCIAL

## 2022-03-14 LAB — BACTERIA UR CULT: NO GROWTH

## 2022-03-14 NOTE — TELEPHONE ENCOUNTER
Tried calling pt. Mailbox is full. Unable to leave message.     If pt returns call to clinic, please relay provider's message below to pt.    Urbano Jasmine CMA Mai See SHEMAR Rosas   3/14/2022 10:10 AM CDT Back to Top        Urine culture was negative for infection.  She may stop the antibiotic.  Recheck in clinic if symptoms worsen or if symptoms do not improve.     Nadeen See SHEMAR Rosas

## 2022-03-22 ENCOUNTER — OFFICE VISIT (OUTPATIENT)
Dept: FAMILY MEDICINE | Facility: CLINIC | Age: 82
End: 2022-03-22
Payer: COMMERCIAL

## 2022-03-22 VITALS
HEIGHT: 61 IN | OXYGEN SATURATION: 92 % | HEART RATE: 99 BPM | BODY MASS INDEX: 29.27 KG/M2 | DIASTOLIC BLOOD PRESSURE: 74 MMHG | RESPIRATION RATE: 16 BRPM | WEIGHT: 155 LBS | SYSTOLIC BLOOD PRESSURE: 142 MMHG

## 2022-03-22 DIAGNOSIS — G89.29 CHRONIC PAIN OF LEFT KNEE: Primary | ICD-10-CM

## 2022-03-22 DIAGNOSIS — M25.462 EFFUSION OF LEFT KNEE: ICD-10-CM

## 2022-03-22 DIAGNOSIS — M25.562 CHRONIC PAIN OF LEFT KNEE: Primary | ICD-10-CM

## 2022-03-22 PROCEDURE — 99213 OFFICE O/P EST LOW 20 MIN: CPT | Performed by: FAMILY MEDICINE

## 2022-03-22 ASSESSMENT — PAIN SCALES - GENERAL: PAINLEVEL: SEVERE PAIN (7)

## 2022-03-22 NOTE — PROGRESS NOTES
"  Assessment & Plan     Chronic pain of left knee    - Arthritis with knee effusion. Has had joint injection prior and did help. Discussed treatment options; including cortisone shot and knee replacement; she would like to consider knee injection; but would like to do this at follow up with PCP in 2 days. Meanwhile will do Tylenol and ibuprofen, which have been helping; cautioned against prolonged use of NSAIDs    Effusion of left knee   -  Flare up of arthritis    Return in about 2 days (around 3/24/2022) for Follow up with PCP.    Min Galeana MD  [facility] GWEN Penny is a 81 year old who presents for the following health issues   HPI   Pain and swelling   Chief Complaint   Patient presents with     Knee Pain     Left knee pain      Knee pain for past several days, associated with swelling  Has on going arthritis of both knees but left worse.  Been doing tylenol/Motrim which help a little    Review of Systems   HEENT, cardiovascular, pulmonary, gi and gu systems are negative, except as otherwise noted.      Objective    BP (!) 149/80 (BP Location: Right arm, Cuff Size: Adult Regular)   Pulse 99   Resp 16   Ht 1.555 m (5' 1.22\")   Wt 70.3 kg (155 lb)   SpO2 92%   BMI 29.08 kg/m    Body mass index is 29.08 kg/m .  Physical Exam   GENERAL: pleasant elderly lady,healthy, alert and no distress  RESP: lungs clear to auscultation - no rales, rhonchi or wheezes  CV: regular rate and rhythm, no murmur, click or rub, no peripheral edema  MS: Walking with limp, Lt Knee; fullness and tenderness with palpation.    "

## 2022-03-24 ENCOUNTER — OFFICE VISIT (OUTPATIENT)
Dept: FAMILY MEDICINE | Facility: CLINIC | Age: 82
End: 2022-03-24
Payer: COMMERCIAL

## 2022-03-24 ENCOUNTER — ANCILLARY PROCEDURE (OUTPATIENT)
Dept: GENERAL RADIOLOGY | Facility: CLINIC | Age: 82
End: 2022-03-24
Attending: FAMILY MEDICINE
Payer: COMMERCIAL

## 2022-03-24 VITALS
WEIGHT: 153 LBS | SYSTOLIC BLOOD PRESSURE: 131 MMHG | DIASTOLIC BLOOD PRESSURE: 65 MMHG | HEART RATE: 96 BPM | BODY MASS INDEX: 28.7 KG/M2 | TEMPERATURE: 97.7 F

## 2022-03-24 DIAGNOSIS — R53.83 FATIGUE, UNSPECIFIED TYPE: ICD-10-CM

## 2022-03-24 DIAGNOSIS — R10.13 DYSPEPSIA: ICD-10-CM

## 2022-03-24 DIAGNOSIS — F41.9 ANXIETY: ICD-10-CM

## 2022-03-24 DIAGNOSIS — M25.562 LEFT KNEE PAIN, UNSPECIFIED CHRONICITY: Primary | ICD-10-CM

## 2022-03-24 DIAGNOSIS — E55.9 VITAMIN D DEFICIENCY: ICD-10-CM

## 2022-03-24 DIAGNOSIS — R73.01 IMPAIRED FASTING GLUCOSE: ICD-10-CM

## 2022-03-24 LAB
ALBUMIN SERPL-MCNC: 3.6 G/DL (ref 3.4–5)
ALP SERPL-CCNC: 82 U/L (ref 40–150)
ALT SERPL W P-5'-P-CCNC: 62 U/L (ref 0–50)
ANION GAP SERPL CALCULATED.3IONS-SCNC: 4 MMOL/L (ref 3–14)
AST SERPL W P-5'-P-CCNC: 43 U/L (ref 0–45)
BASOPHILS # BLD AUTO: 0 10E3/UL (ref 0–0.2)
BASOPHILS NFR BLD AUTO: 0 %
BILIRUB SERPL-MCNC: 0.4 MG/DL (ref 0.2–1.3)
BUN SERPL-MCNC: 18 MG/DL (ref 7–30)
CALCIUM SERPL-MCNC: 9.3 MG/DL (ref 8.5–10.1)
CHLORIDE BLD-SCNC: 107 MMOL/L (ref 94–109)
CO2 SERPL-SCNC: 28 MMOL/L (ref 20–32)
CREAT SERPL-MCNC: 0.67 MG/DL (ref 0.52–1.04)
EOSINOPHIL # BLD AUTO: 0.2 10E3/UL (ref 0–0.7)
EOSINOPHIL NFR BLD AUTO: 4 %
ERYTHROCYTE [DISTWIDTH] IN BLOOD BY AUTOMATED COUNT: 12.9 % (ref 10–15)
GFR SERPL CREATININE-BSD FRML MDRD: 87 ML/MIN/1.73M2
GLUCOSE BLD-MCNC: 105 MG/DL (ref 70–99)
HBA1C MFR BLD: 5.6 % (ref 0–5.6)
HCT VFR BLD AUTO: 39.8 % (ref 35–47)
HGB BLD-MCNC: 13 G/DL (ref 11.7–15.7)
LYMPHOCYTES # BLD AUTO: 1.5 10E3/UL (ref 0.8–5.3)
LYMPHOCYTES NFR BLD AUTO: 33 %
MCH RBC QN AUTO: 31.6 PG (ref 26.5–33)
MCHC RBC AUTO-ENTMCNC: 32.7 G/DL (ref 31.5–36.5)
MCV RBC AUTO: 97 FL (ref 78–100)
MONOCYTES # BLD AUTO: 0.6 10E3/UL (ref 0–1.3)
MONOCYTES NFR BLD AUTO: 13 %
NEUTROPHILS # BLD AUTO: 2.4 10E3/UL (ref 1.6–8.3)
NEUTROPHILS NFR BLD AUTO: 51 %
PLATELET # BLD AUTO: 200 10E3/UL (ref 150–450)
POTASSIUM BLD-SCNC: 3.9 MMOL/L (ref 3.4–5.3)
PROT SERPL-MCNC: 7 G/DL (ref 6.8–8.8)
RBC # BLD AUTO: 4.12 10E6/UL (ref 3.8–5.2)
SODIUM SERPL-SCNC: 139 MMOL/L (ref 133–144)
TSH SERPL DL<=0.005 MIU/L-ACNC: 1.31 MU/L (ref 0.4–4)
WBC # BLD AUTO: 4.7 10E3/UL (ref 4–11)

## 2022-03-24 PROCEDURE — 73562 X-RAY EXAM OF KNEE 3: CPT | Mod: LT | Performed by: RADIOLOGY

## 2022-03-24 PROCEDURE — 83036 HEMOGLOBIN GLYCOSYLATED A1C: CPT | Performed by: FAMILY MEDICINE

## 2022-03-24 PROCEDURE — 85025 COMPLETE CBC W/AUTO DIFF WBC: CPT | Performed by: FAMILY MEDICINE

## 2022-03-24 PROCEDURE — 36415 COLL VENOUS BLD VENIPUNCTURE: CPT | Performed by: FAMILY MEDICINE

## 2022-03-24 PROCEDURE — 82306 VITAMIN D 25 HYDROXY: CPT | Performed by: FAMILY MEDICINE

## 2022-03-24 PROCEDURE — 84443 ASSAY THYROID STIM HORMONE: CPT | Performed by: FAMILY MEDICINE

## 2022-03-24 PROCEDURE — 80053 COMPREHEN METABOLIC PANEL: CPT | Performed by: FAMILY MEDICINE

## 2022-03-24 PROCEDURE — 99214 OFFICE O/P EST MOD 30 MIN: CPT | Performed by: FAMILY MEDICINE

## 2022-03-24 ASSESSMENT — PATIENT HEALTH QUESTIONNAIRE - PHQ9
10. IF YOU CHECKED OFF ANY PROBLEMS, HOW DIFFICULT HAVE THESE PROBLEMS MADE IT FOR YOU TO DO YOUR WORK, TAKE CARE OF THINGS AT HOME, OR GET ALONG WITH OTHER PEOPLE: NOT DIFFICULT AT ALL
SUM OF ALL RESPONSES TO PHQ QUESTIONS 1-9: 11
SUM OF ALL RESPONSES TO PHQ QUESTIONS 1-9: 11

## 2022-03-24 ASSESSMENT — PAIN SCALES - GENERAL: PAINLEVEL: NO PAIN (0)

## 2022-03-24 NOTE — LETTER
March 28, 2022      Hemalatha Handy  [address]  [facility] [address]        Dear ,    We are writing to inform you of your test results.    Nothing worrisome on the lab results        Resulted Orders   Vitamin D Deficiency   Result Value Ref Range    Vitamin D, Total (25-Hydroxy) 35 20 - 75 ug/L    Narrative    Season, race, dietary intake, and treatment affect the concentration of 25-hydroxy-Vitamin D. Values may decrease during winter months and increase during summer months. Values 20-29 ug/L may indicate Vitamin D insufficiency and values <20 ug/L may indicate Vitamin D deficiency.    Vitamin D determination is routinely performed by an immunoassay specific for 25 hydroxyvitamin D3.  If an individual is on vitamin D2(ergocalciferol) supplementation, please specify 25 OH vitamin D2 and D3 level determination by LCMSMS test VITD23.     Comprehensive metabolic panel   Result Value Ref Range    Sodium 139 133 - 144 mmol/L    Potassium 3.9 3.4 - 5.3 mmol/L    Chloride 107 94 - 109 mmol/L    Carbon Dioxide (CO2) 28 20 - 32 mmol/L    Anion Gap 4 3 - 14 mmol/L    Urea Nitrogen 18 7 - 30 mg/dL    Creatinine 0.67 0.52 - 1.04 mg/dL    Calcium 9.3 8.5 - 10.1 mg/dL    Glucose 105 (H) 70 - 99 mg/dL    Alkaline Phosphatase 82 40 - 150 U/L    AST 43 0 - 45 U/L    ALT 62 (H) 0 - 50 U/L    Protein Total 7.0 6.8 - 8.8 g/dL    Albumin 3.6 3.4 - 5.0 g/dL    Bilirubin Total 0.4 0.2 - 1.3 mg/dL    GFR Estimate 87 >60 mL/min/1.73m2      Comment:      Effective December 21, 2021 eGFRcr in adults is calculated using the 2021 CKD-EPI creatinine equation which includes age and gender (Alton foley al., NEJM, DOI: 10.1056/KHOZek4680000)   Hemoglobin A1c   Result Value Ref Range    Hemoglobin A1C 5.6 0.0 - 5.6 %      Comment:      Normal <5.7%   Prediabetes 5.7-6.4%    Diabetes 6.5% or higher     Note: Adopted from ADA consensus guidelines.   TSH with free T4 reflex   Result Value Ref Range    TSH 1.31 0.40 -  4.00 mU/L   CBC with platelets and differential   Result Value Ref Range    WBC Count 4.7 4.0 - 11.0 10e3/uL    RBC Count 4.12 3.80 - 5.20 10e6/uL    Hemoglobin 13.0 11.7 - 15.7 g/dL    Hematocrit 39.8 35.0 - 47.0 %    MCV 97 78 - 100 fL    MCH 31.6 26.5 - 33.0 pg    MCHC 32.7 31.5 - 36.5 g/dL    RDW 12.9 10.0 - 15.0 %    Platelet Count 200 150 - 450 10e3/uL    % Neutrophils 51 %    % Lymphocytes 33 %    % Monocytes 13 %    % Eosinophils 4 %    % Basophils 0 %    Absolute Neutrophils 2.4 1.6 - 8.3 10e3/uL    Absolute Lymphocytes 1.5 0.8 - 5.3 10e3/uL    Absolute Monocytes 0.6 0.0 - 1.3 10e3/uL    Absolute Eosinophils 0.2 0.0 - 0.7 10e3/uL    Absolute Basophils 0.0 0.0 - 0.2 10e3/uL       If you have any questions or concerns, please call the clinic at the number listed above.       Sincerely,      Javier Jones MD/jackson

## 2022-03-24 NOTE — PROGRESS NOTES
Subjective   Hemalatha is a 81 year old who presents for the following health issues     Knee Pain    History of Present Illness       Mental Health Follow-up:                    Today's PHQ-9         PHQ-9 Total Score: 11  PHQ-9 Q9 Thoughts of better off dead/self-harm past 2 weeks :   (P) Not at all    How difficult have these problems made it for you to do your work, take care of things at home, or get along with other people: Not difficult at all        Reason for visit:  Swollen left knee  Symptom onset:  3-7 days ago  Symptom intensity:  Moderate  Symptom progression:  Improving  Had these symptoms before:  Yes  Has tried/received treatment for these symptoms:  Yes  Previous treatment was successful:  Yes  Prior treatment description:  Cortisone  What makes it worse:  Movement  What makes it better:  Rest  ibuprofenShe exercises with enough effort to increase her heart rate 10 to 19 minutes per day.  She exercises with enough effort to increase her heart rate 3 or less days per week.   She is taking medications regularly.        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  Physical Exam  Constitutional:       Appearance: She is well-developed.   HENT:      Head: Normocephalic and atraumatic.   Eyes:      Conjunctiva/sclera: Conjunctivae normal.   Neck:      Vascular: No carotid bruit.   Cardiovascular:      Rate and Rhythm: Normal rate and regular rhythm.       Heart sounds: Normal heart sounds.   Pulmonary:      Effort: Pulmonary effort is normal. No respiratory distress.      Breath sounds: Normal breath sounds.   Abdominal:      Palpations: Abdomen is soft.      Tenderness: There is no abdominal tenderness.   Neurological:      Mental Status: She is alert and oriented to person, place, and time.      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 ( and this was 7ish years ago )  Plan: XR Knee Left 3 Views, Orthopedic          Referral             (R73.01) Impaired fasting glucose  Comment: check   Plan: Hemoglobin A1c             (E55.9) Vitamin D deficiency  Comment: check   Plan: Vitamin D Deficiency        Low in past     (R53.83) Fatigue, unspecified type  Comment: check   Plan: Comprehensive metabolic panel, CBC with         Platelets & Differential, TSH with free T4         reflex             (R10.13) Dyspepsia  Comment: on ppi for now; continue   Plan: as above    Anxiety; uses the alprazolam often at night      I reviewed the patient's medications, allergies, medical history, family history, and social history.    Javier Jones MD

## 2022-03-24 NOTE — PATIENT INSTRUCTIONS
See orthopedics for knee ( saw Dr Perez before )    We will send you lab result    Stay on omeprazole daily

## 2022-03-25 LAB — DEPRECATED CALCIDIOL+CALCIFEROL SERPL-MC: 35 UG/L (ref 20–75)

## 2022-03-25 ASSESSMENT — PATIENT HEALTH QUESTIONNAIRE - PHQ9: SUM OF ALL RESPONSES TO PHQ QUESTIONS 1-9: 11

## 2022-03-30 ENCOUNTER — OFFICE VISIT (OUTPATIENT)
Dept: ORTHOPEDICS | Facility: CLINIC | Age: 82
End: 2022-03-30
Attending: FAMILY MEDICINE
Payer: COMMERCIAL

## 2022-03-30 VITALS
HEART RATE: 103 BPM | WEIGHT: 152.8 LBS | HEIGHT: 61 IN | BODY MASS INDEX: 28.85 KG/M2 | DIASTOLIC BLOOD PRESSURE: 68 MMHG | SYSTOLIC BLOOD PRESSURE: 144 MMHG

## 2022-03-30 DIAGNOSIS — M70.52 PES ANSERINUS BURSITIS OF LEFT KNEE: ICD-10-CM

## 2022-03-30 DIAGNOSIS — G89.29 CHRONIC PAIN OF LEFT KNEE: ICD-10-CM

## 2022-03-30 DIAGNOSIS — M17.12 PRIMARY OSTEOARTHRITIS OF LEFT KNEE: Primary | ICD-10-CM

## 2022-03-30 DIAGNOSIS — M25.562 CHRONIC PAIN OF LEFT KNEE: ICD-10-CM

## 2022-03-30 PROCEDURE — 99203 OFFICE O/P NEW LOW 30 MIN: CPT | Performed by: ORTHOPAEDIC SURGERY

## 2022-03-30 ASSESSMENT — PAIN SCALES - GENERAL: PAINLEVEL: NO PAIN (1)
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
## 2022-08-09 ENCOUNTER — TELEPHONE (OUTPATIENT)
Dept: FAMILY MEDICINE | Facility: CLINIC | Age: 82
End: 2022-08-09

## 2022-08-09 NOTE — TELEPHONE ENCOUNTER
Reason for Call:  Form, our goal is to have forms completed with 72 hours, however, some forms may require a visit or additional information.    Type of letter, form or note:  medical    Who is the form from?: Patient    Where did the form come from: Patient or family brought in       What clinic location was the form placed at?: [facility]    Where the form was placed: De[employer] mail box on [address]nd  Box/Folder    What number is listed as a contact on the form?: [phone]       Additional comments: mail form in envelope that pt gave.    Call taken on 8/9/2022 at 2:10 PM by Carley Sanches

## 2022-09-01 ENCOUNTER — TELEPHONE (OUTPATIENT)
Dept: FAMILY MEDICINE | Facility: CLINIC | Age: 82
End: 2022-09-01

## 2022-09-01 DIAGNOSIS — M25.519 SHOULDER PAIN, UNSPECIFIED CHRONICITY, UNSPECIFIED LATERALITY: Primary | ICD-10-CM

## 2022-09-01 NOTE — TELEPHONE ENCOUNTER
Spoke with pt and notified of below, agreeable.     Thanks,  MIGUELINA Sunshine  [facility]

## 2022-09-01 NOTE — TELEPHONE ENCOUNTER
Received call from patient. She states that she has a rotator cuff issue in her right shoulder and is wondering if she can get an injection in her shoulder for the pain. States that she did see a doctor about it, but unclear through chart review who that doctor was. Pt states that she does not want to do surgery. Her shoulder has been very painful lately. She takes Motrin or tylenol extra strength that does not seem to be providing relief. Please advise.    Bridgette Monge RN   [facility]

## 2022-09-01 NOTE — TELEPHONE ENCOUNTER
Patient has seen Dr. Perez before for knee.  Ana could see her again, for shoulder this time and possibly do injection.  ( or other orthopedist or sports med provider per patient preference )    Please inform patient.  I did referral.    Javier Jones MD

## 2022-09-06 DIAGNOSIS — F41.9 ANXIETY: ICD-10-CM

## 2022-09-07 RX ORDER — CITALOPRAM HYDROBROMIDE 20 MG/1
TABLET ORAL
Qty: 90 TABLET | Refills: 1 | Status: SHIPPED | OUTPATIENT
Start: 2022-09-07 | End: 2023-04-28

## 2022-09-07 NOTE — TELEPHONE ENCOUNTER
"Requested Prescriptions   Signed Prescriptions Disp Refills    citalopram (CELEXA) 20 MG tablet 90 tablet 1     Sig: TAKE 1 TABLET(20 MG) BY MOUTH DAILY       SSRIs Protocol Passed - 9/6/2022  6:02 AM        Passed - Recent (12 mo) or future (30 days) visit within the authorizing provider's specialty     Patient has had an office visit with the authorizing provider or a provider within the authorizing providers department within the previous 12 mos or has a future within next 30 days. See \"Patient Info\" tab in inbasket, or \"Choose Columns\" in Meds & Orders section of the refill encounter.              Passed - Medication is active on med list        Passed - Patient is age 18 or older        Passed - No active pregnancy on record        Passed - No positive pregnancy test in last 12 months           Bita Sandoval RN  [facility]     "

## 2022-09-12 ENCOUNTER — TELEPHONE (OUTPATIENT)
Dept: FAMILY MEDICINE | Facility: CLINIC | Age: 82
End: 2022-09-12

## 2022-09-12 NOTE — TELEPHONE ENCOUNTER
Reason for Call:  Other call back    Detailed comments: Hemalatha has been having some bronchitis issue for about a week and would like to get a call back to see what she can do to get better.    Phone Number Patient can be reached at: Cell number on file:    Telephone Information:   Mobile [phone]       Best Time: Anytime    Can we leave a detailed message on this number? YES    Call taken on 9/12/2022 at 10:19 AM by Maria Fernanda Sweet

## 2022-09-13 NOTE — TELEPHONE ENCOUNTER
Please call patient and route notification to ambulatory IP:     She is out of treatment window for COVID. Continue self care.    How can I take care of myself?  1. Get lots of rest. Drink extra fluids (unless a doctor has told you not to).  2. Take Tylenol (acetaminophen) for fever or pain. If you have liver or kidney problems, ask your family doctor if it's okay to take Tylenol   Adults:   650 mg (two 325 mg pills or tablets) every 4 to 6 hours, or...   1,000 mg (two 500 mg pills or tablets) every 8 hours as needed.  Note: Don't take more than 3,000 mg in one day. Acetaminophen is found in many medicines (both prescribed and over the counter). Read all labels to be sure you don't take too much.    Take over the counter medicines for your symptoms as needed. Talk to your pharmacist.

## 2022-09-13 NOTE — TELEPHONE ENCOUNTER
Spoke with pt. States she thinks she is getting better. Is not coughing up as much. Doesn't think she needs an antibiotic. No fever. Does have a history of bronchitis. Knew she was getting that and started taking Tylenol and cold and flu capsule. Is still coughing up yellow phlegm. Has episodes where she get's sweaty. Occurs every day. Was like that for 1 hour this morning. Is not feeling good. Has no energy. Has to make herself have energy and to do things. No difficulty breathing. No wheezing. Had a bad sore throat. Did an at home covid test and was positive 5 days ago. Then she tested negative within the 5 day period.  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 Pt requested a message be sent to PCP to advise.    Alena Michelle RN  [facility]

## 2022-09-13 NOTE — TELEPHONE ENCOUNTER
Reviewed provider's message with pt. Message routed to IP to document positive covid.    Alena Michelle RN  [facility]

## 2022-10-17 DIAGNOSIS — F41.9 ANXIETY: ICD-10-CM

## 2022-10-17 RX ORDER — ALPRAZOLAM 0.25 MG
TABLET ORAL
Qty: 30 TABLET | Refills: 0 | Status: SHIPPED | OUTPATIENT
Start: 2022-10-17 | End: 2022-11-15

## 2022-10-18 NOTE — TELEPHONE ENCOUNTER
Called patient and left a message that medication was refilled and that an office is due in the next month.  Lizett Colorado CMA

## 2022-11-09 ENCOUNTER — OFFICE VISIT (OUTPATIENT)
Dept: OBGYN | Facility: CLINIC | Age: 82
End: 2022-11-09
Payer: COMMERCIAL

## 2022-11-09 VITALS
DIASTOLIC BLOOD PRESSURE: 72 MMHG | OXYGEN SATURATION: 97 % | SYSTOLIC BLOOD PRESSURE: 124 MMHG | HEART RATE: 102 BPM | WEIGHT: 153.8 LBS | BODY MASS INDEX: 28.82 KG/M2

## 2022-11-09 DIAGNOSIS — N90.89 VULVAR LESION: ICD-10-CM

## 2022-11-09 DIAGNOSIS — C51.9: Primary | ICD-10-CM

## 2022-11-09 DIAGNOSIS — Z85.41 HISTORY OF CERVICAL CANCER: ICD-10-CM

## 2022-11-09 PROCEDURE — 56606 BIOPSY OF VULVA/PERINEUM: CPT | Performed by: OBSTETRICS & GYNECOLOGY

## 2022-11-09 PROCEDURE — 88305 TISSUE EXAM BY PATHOLOGIST: CPT | Performed by: PATHOLOGY

## 2022-11-09 PROCEDURE — 99202 OFFICE O/P NEW SF 15 MIN: CPT | Mod: 25 | Performed by: OBSTETRICS & GYNECOLOGY

## 2022-11-09 PROCEDURE — 56605 BIOPSY OF VULVA/PERINEUM: CPT | Performed by: OBSTETRICS & GYNECOLOGY

## 2022-11-09 NOTE — PROGRESS NOTES
Hemalatha is an 82 year old female,     She has had a couple of vulvar lumps.  These are on the left side.  They started about 3 months ago.  She might have had them longer.      It started getting irritated and increased a little in size.  She used hot packs and they decreased in size a little.   She used lotrimin cream and it did not help.     She states she had a VD years ago, that her  gave her.      She had cervical cancer in the early 1960s and she had radiation therapy.        Past Medical History:   Diagnosis Date     Actinic keratosis      Basal cell carcinoma pt unsure    abdomen, and elsewhere     Cervical cancer (H) early     stage 3 txd      Colorectal polyps     1 Polyp     Hypertension        Past Surgical History:   Procedure Laterality Date     COLONOSCOPY  2008     ENT SURGERY  2012    Left lower lip lesion       Allergies   Allergen Reactions     Alcohol Hives     Amoxicillin Hives     Asa [Aspirin]      Evista [Raloxifene Hydrochloride]      Prempro      Raloxifene Hives and Rash       Current Outpatient Medications   Medication Sig Dispense Refill     ALPRAZolam (XANAX) 0.25 MG tablet TAKE 1 TABLET BY MOUTH THREE TIMES DAILY AS NEEDED FOR ANXIETY 30 tablet 0     citalopram (CELEXA) 20 MG tablet TAKE 1 TABLET(20 MG) BY MOUTH DAILY 90 tablet 1     clotrimazole (LOTRIMIN) 1 % external cream Apply topically 2 times daily as needed (fungal rash) 60 g 1     lisinopril (ZESTRIL) 10 MG tablet TAKE 1 TABLET(10 MG) BY MOUTH DAILY 90 tablet 0     chlorhexidine (PERIDEX) 0.12 % solution SWISH AND SPIT 15 ML BY MOUTH TWICE DAILY AS NEEDED (Patient not taking: Reported on 2022) 473 mL 1     meclizine (ANTIVERT) 25 MG tablet Take 1 tablet (25 mg) by mouth 3 times daily as needed for dizziness (Patient not taking: Reported on 2022) 30 tablet 2     MOTRIN 800 MG OR TABS 1 TABLET 3 TIMES DAILY AS NEEDED (Patient not taking: Reported on 2022)       omeprazole  (PRILOSEC) 20 MG DR capsule TAKE 1 CAPSULE(20 MG) BY MOUTH DAILY (Patient not taking: Reported on 2022) 90 capsule 0       Social History     Socioeconomic History     Marital status: Single     Spouse name: Not on file     Number of children: Not on file     Years of education: Not on file     Highest education level: Not on file   Occupational History     Employer: [employer]     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 No   Social History Narrative     Not on file     Social Determinants of Health     Financial Resource Strain: Not on file   Food Insecurity: Not on file   Transportation Needs: Not on file   Physical Activity: Not on file   Stress: Not on file   Social Connections: Not on file   Intimate Partner Violence: Not on file   Housing Stability: Not on file       Family History   Problem Relation Age of Onset     Hypertension Brother      Hypertension Mother      Cancer Son         hodgkins     C.A.D. No family hx of      Cerebrovascular Disease No family hx of        Review of Systems:  10 point ROS of systems including Constitutional, Eyes, Respiratory, Cardiovascular, Gastroenterology, Genitourinary, Integumentary, Muscularskeletal, Psychiatric were all negative except for pertinent positives noted in my HPI and in the PMH.      Exam  /72 (BP Location: Right arm, Cuff Size: Adult Regular)   Pulse 102   Wt 69.8 kg (153 lb 12.8 oz)   SpO2 97%   BMI 28.82 kg/m    General:  WNWD female, NAD  Alert  Oriented x 3  Gait:  Normal  Skin:  Normal skin turgor  HEENT:  NC/AT, EOMI  Abdomen:  Non-tender, non-distended.  Vulva: there are two firm  nodules, about 5 mm and 8 mm, labia majora/lateral tissue on the left, normal hair distribution, no adenopathy  BUS:  Normal, no masses noted  Urethral meatus:  No masses noted  Perianal:  No masses noted  Extremities:  No clubbing, no cyanosis and no edema.      Assessment  Vulvar lesions  History of cervical cancer       Plan  With her cancer history, I suggest to have the vulvar biopsy, due to the uncertain nature of the lesions.   She voices understanding of the procedure and the goals and limitations.      Steve Martin MD        PROCEDURE NOTE:  The procedure was reviewed with patient.  After consenting to the procedure she was placed into the dorsal lithotomy position.  The examination was performed.  The left labia majora was prepped with Betadine.  1% lidocaine was used for analgesia.  Two 4 mm punch biopsies were taken.  The tissue was removed and hemostasis was achieved with Silver Nitrate.  She tolerated the procedure well.   Instruments were removed and the specimen was sent to pathology.  Results to the patient in 1 week,  when available.       Steve Martin MD        .

## 2022-11-14 LAB
PATH REPORT.COMMENTS IMP SPEC: ABNORMAL
PATH REPORT.COMMENTS IMP SPEC: YES
PATH REPORT.FINAL DX SPEC: ABNORMAL
PATH REPORT.GROSS SPEC: ABNORMAL
PATH REPORT.MICROSCOPIC SPEC OTHER STN: ABNORMAL
PATH REPORT.RELEVANT HX SPEC: ABNORMAL
PHOTO IMAGE: ABNORMAL

## 2022-11-15 ENCOUNTER — OFFICE VISIT (OUTPATIENT)
Dept: FAMILY MEDICINE | Facility: CLINIC | Age: 82
End: 2022-11-15
Payer: COMMERCIAL

## 2022-11-15 VITALS
SYSTOLIC BLOOD PRESSURE: 137 MMHG | WEIGHT: 156.25 LBS | DIASTOLIC BLOOD PRESSURE: 74 MMHG | OXYGEN SATURATION: 97 % | BODY MASS INDEX: 29.28 KG/M2 | TEMPERATURE: 97.3 F | HEART RATE: 79 BPM

## 2022-11-15 DIAGNOSIS — Z85.828 HISTORY OF SKIN CANCER: ICD-10-CM

## 2022-11-15 DIAGNOSIS — F41.9 ANXIETY: Primary | ICD-10-CM

## 2022-11-15 DIAGNOSIS — R73.01 IMPAIRED FASTING GLUCOSE: ICD-10-CM

## 2022-11-15 DIAGNOSIS — E78.5 HYPERLIPIDEMIA LDL GOAL <100: ICD-10-CM

## 2022-11-15 DIAGNOSIS — B37.2 INTERTRIGINOUS CANDIDIASIS: ICD-10-CM

## 2022-11-15 DIAGNOSIS — E55.9 VITAMIN D DEFICIENCY DISEASE: ICD-10-CM

## 2022-11-15 DIAGNOSIS — R53.83 FATIGUE, UNSPECIFIED TYPE: ICD-10-CM

## 2022-11-15 LAB
ALBUMIN SERPL-MCNC: 3.8 G/DL (ref 3.4–5)
ALP SERPL-CCNC: 88 U/L (ref 40–150)
ALT SERPL W P-5'-P-CCNC: 21 U/L (ref 0–50)
ANION GAP SERPL CALCULATED.3IONS-SCNC: 2 MMOL/L (ref 3–14)
AST SERPL W P-5'-P-CCNC: 18 U/L (ref 0–45)
BASOPHILS # BLD AUTO: 0 10E3/UL (ref 0–0.2)
BASOPHILS NFR BLD AUTO: 0 %
BILIRUB SERPL-MCNC: 0.5 MG/DL (ref 0.2–1.3)
BUN SERPL-MCNC: 16 MG/DL (ref 7–30)
CALCIUM SERPL-MCNC: 8.6 MG/DL (ref 8.5–10.1)
CHLORIDE BLD-SCNC: 111 MMOL/L (ref 94–109)
CHOLEST SERPL-MCNC: 215 MG/DL
CO2 SERPL-SCNC: 29 MMOL/L (ref 20–32)
CREAT SERPL-MCNC: 0.65 MG/DL (ref 0.52–1.04)
DEPRECATED CALCIDIOL+CALCIFEROL SERPL-MC: 30 UG/L (ref 20–75)
EOSINOPHIL # BLD AUTO: 0.2 10E3/UL (ref 0–0.7)
EOSINOPHIL NFR BLD AUTO: 4 %
ERYTHROCYTE [DISTWIDTH] IN BLOOD BY AUTOMATED COUNT: 13.6 % (ref 10–15)
FASTING STATUS PATIENT QL REPORTED: YES
GFR SERPL CREATININE-BSD FRML MDRD: 87 ML/MIN/1.73M2
GLUCOSE BLD-MCNC: 104 MG/DL (ref 70–99)
HBA1C MFR BLD: 5.7 % (ref 0–5.6)
HCT VFR BLD AUTO: 43.8 % (ref 35–47)
HDLC SERPL-MCNC: 54 MG/DL
HGB BLD-MCNC: 14.2 G/DL (ref 11.7–15.7)
LDLC SERPL CALC-MCNC: 132 MG/DL
LYMPHOCYTES # BLD AUTO: 1.2 10E3/UL (ref 0.8–5.3)
LYMPHOCYTES NFR BLD AUTO: 30 %
MCH RBC QN AUTO: 31.9 PG (ref 26.5–33)
MCHC RBC AUTO-ENTMCNC: 32.4 G/DL (ref 31.5–36.5)
MCV RBC AUTO: 98 FL (ref 78–100)
MONOCYTES # BLD AUTO: 0.5 10E3/UL (ref 0–1.3)
MONOCYTES NFR BLD AUTO: 12 %
NEUTROPHILS # BLD AUTO: 2.3 10E3/UL (ref 1.6–8.3)
NEUTROPHILS NFR BLD AUTO: 55 %
NONHDLC SERPL-MCNC: 161 MG/DL
PLATELET # BLD AUTO: 211 10E3/UL (ref 150–450)
POTASSIUM BLD-SCNC: 4.4 MMOL/L (ref 3.4–5.3)
PROT SERPL-MCNC: 7.1 G/DL (ref 6.8–8.8)
RBC # BLD AUTO: 4.45 10E6/UL (ref 3.8–5.2)
SODIUM SERPL-SCNC: 142 MMOL/L (ref 133–144)
TRIGL SERPL-MCNC: 146 MG/DL
TSH SERPL DL<=0.005 MIU/L-ACNC: 1.69 MU/L (ref 0.4–4)
WBC # BLD AUTO: 4.1 10E3/UL (ref 4–11)

## 2022-11-15 PROCEDURE — 80061 LIPID PANEL: CPT | Performed by: FAMILY MEDICINE

## 2022-11-15 PROCEDURE — 36415 COLL VENOUS BLD VENIPUNCTURE: CPT | Performed by: FAMILY MEDICINE

## 2022-11-15 PROCEDURE — 83036 HEMOGLOBIN GLYCOSYLATED A1C: CPT | Performed by: FAMILY MEDICINE

## 2022-11-15 PROCEDURE — 84443 ASSAY THYROID STIM HORMONE: CPT | Performed by: FAMILY MEDICINE

## 2022-11-15 PROCEDURE — 82306 VITAMIN D 25 HYDROXY: CPT | Performed by: FAMILY MEDICINE

## 2022-11-15 PROCEDURE — 99214 OFFICE O/P EST MOD 30 MIN: CPT | Performed by: FAMILY MEDICINE

## 2022-11-15 PROCEDURE — 80053 COMPREHEN METABOLIC PANEL: CPT | Performed by: FAMILY MEDICINE

## 2022-11-15 PROCEDURE — 85025 COMPLETE CBC W/AUTO DIFF WBC: CPT | Performed by: FAMILY MEDICINE

## 2022-11-15 RX ORDER — CLOTRIMAZOLE 1 %
CREAM (GRAM) TOPICAL 2 TIMES DAILY PRN
Qty: 60 G | Refills: 1 | Status: SHIPPED | OUTPATIENT
Start: 2022-11-15 | End: 2023-04-19

## 2022-11-15 RX ORDER — ALPRAZOLAM 0.25 MG
0.25 TABLET ORAL 3 TIMES DAILY PRN
Qty: 30 TABLET | Refills: 0 | Status: SHIPPED | OUTPATIENT
Start: 2022-11-15 | End: 2022-12-14

## 2022-11-15 ASSESSMENT — PAIN SCALES - GENERAL: PAINLEVEL: NO PAIN (0)

## 2022-11-15 NOTE — PROGRESS NOTES
Dorian Penny is a 82 year old, presenting for the following health issues:  Recheck Medication      History of Present Illness       Reason for visit:  Follow up    She eats 2-3 servings of fruits and vegetables daily.She consumes 0 sweetened beverage(s) daily.She exercises with enough effort to increase her heart rate 9 or less minutes per day.  She exercises with enough effort to increase her heart rate 3 or less days per week.   She is taking medications regularly.        Review of Systems    sometime real tired    Eat something feels better    Or has to lay down    Had some vertigo but now now    Sleep well  Uses the xanax    No exercise     Dealing with ex   He is going through chemo          Objective    BP (!) 167/89 (BP Location: Right arm, Patient Position: Chair, Cuff Size: Adult Regular)   Pulse 79   Temp 97.3  F (36.3  C) (Temporal)   Wt 70.9 kg (156 lb 4 oz)   SpO2 97%   BMI 29.28 kg/m    Body mass index is 29.28 kg/m .  Physical Exam  Constitutional:       Appearance: She is well-developed and well-nourished.   HENT:      Head: Normocephalic and atraumatic.   Eyes:      Conjunctiva/sclera: Conjunctivae normal.   Neck:      Vascular: No carotid bruit.   Cardiovascular:      Rate and Rhythm: Normal rate and regular rhythm.      Heart sounds: Normal heart sounds.   Pulmonary:      Effort: Pulmonary effort is normal. No respiratory distress.      Breath sounds: Normal breath sounds.   Abdominal:      Palpations: Abdomen is soft.      Tenderness: There is no abdominal tenderness.   Musculoskeletal:         General: No edema.   Neurological:      Mental Status: She is alert and oriented to person, place, and time.         no edema  Radials symmetric        ASSESSMENT / PLAN:  (F41.9) Anxiety  (primary encounter diagnosis)  Comment: needs refill   Plan: ALPRAZolam (XANAX) 0.25 MG tablet        Uses nightly , chronic     (Z85.828) History of skin cancer  Comment: patient had the recent  gyn biopsy, was basal cell.  Discussed in detail.  Advised her to follow up with gyn ( may need wider excision ) but also see dermatology ( this is 2nd skin cancer she has had )   Plan: Adult Dermatology Referral        Patient to schedule     (E55.9) Vitamin D deficiency disease  Comment: check level   Plan: Vitamin D Deficiency             (R73.01) Impaired fasting glucose  Comment: check   Plan: Hemoglobin A1c             (R53.83) Fatigue, unspecified type  Comment: check   Plan: CBC with Platelets & Differential, TSH with         free T4 reflex             (E78.5) Hyperlipidemia LDL goal <100  Comment: check   Plan: Lipid panel reflex to direct LDL Non-fasting,         Comprehensive metabolic panel             (B37.2) Intertriginous candidiasis  Comment: needs refill; printed this so she could either use over the counter or prescription   Plan: clotrimazole (LOTRIMIN) 1 % external cream               I reviewed the patient's medications, allergies, medical history, family history, and social history.    Javier Jones MD

## 2022-11-15 NOTE — LETTER
"November 16, 2022    Hemalatha Handy  [address]  [facility] [address]          Dear ,    We are writing to inform you of your test results.    Cholesterol is moderately elevated.     Hemoglobin a1c is barely into the \"prediabetes\" range.     No medications needed for these issues; just keep working on healthy diet/exercise and weight loss as you are able.    Other labs are okay.        Resulted Orders   Lipid panel reflex to direct LDL Non-fasting   Result Value Ref Range    Cholesterol 215 (H) <200 mg/dL    Triglycerides 146 <150 mg/dL    Direct Measure HDL 54 >=50 mg/dL    LDL Cholesterol Calculated 132 (H) <=100 mg/dL    Non HDL Cholesterol 161 (H) <130 mg/dL    Patient Fasting > 8hrs? Yes     Narrative    Cholesterol  Desirable:  <200 mg/dL    Triglycerides  Normal:  Less than 150 mg/dL  Borderline High:  150-199 mg/dL  High:  200-499 mg/dL  Very High:  Greater than or equal to 500 mg/dL    Direct Measure HDL  Female:  Greater than or equal to 50 mg/dL   Male:  Greater than or equal to 40 mg/dL    LDL Cholesterol  Desirable:  <100mg/dL  Above Desirable:  100-129 mg/dL   Borderline High:  130-159 mg/dL   High:  160-189 mg/dL   Very High:  >= 190 mg/dL    Non HDL Cholesterol  Desirable:  130 mg/dL  Above Desirable:  130-159 mg/dL  Borderline High:  160-189 mg/dL  High:  190-219 mg/dL  Very High:  Greater than or equal to 220 mg/dL   Comprehensive metabolic panel   Result Value Ref Range    Sodium 142 133 - 144 mmol/L    Potassium 4.4 3.4 - 5.3 mmol/L    Chloride 111 (H) 94 - 109 mmol/L    Carbon Dioxide (CO2) 29 20 - 32 mmol/L    Anion Gap 2 (L) 3 - 14 mmol/L    Urea Nitrogen 16 7 - 30 mg/dL    Creatinine 0.65 0.52 - 1.04 mg/dL    Calcium 8.6 8.5 - 10.1 mg/dL    Glucose 104 (H) 70 - 99 mg/dL    Alkaline Phosphatase 88 40 - 150 U/L    AST 18 0 - 45 U/L    ALT 21 0 - 50 U/L    Protein Total 7.1 6.8 - 8.8 g/dL    Albumin 3.8 3.4 - 5.0 g/dL    Bilirubin Total 0.5 0.2 - 1.3 mg/dL    " GFR Estimate 87 >60 mL/min/1.73m2      Comment:      Effective December 21, 2021 eGFRcr in adults is calculated using the 2021 CKD-EPI creatinine equation which includes age and gender (Alton et al., NE, DOI: 10.1056/OVQFqs7277783)   Hemoglobin A1c   Result Value Ref Range    Hemoglobin A1C 5.7 (H) 0.0 - 5.6 %      Comment:      Normal <5.7%   Prediabetes 5.7-6.4%    Diabetes 6.5% or higher     Note: Adopted from ADA consensus guidelines.   TSH with free T4 reflex   Result Value Ref Range    TSH 1.69 0.40 - 4.00 mU/L   Vitamin D Deficiency   Result Value Ref Range    Vitamin D, Total (25-Hydroxy) 30 20 - 75 ug/L    Narrative    Season, race, dietary intake, and treatment affect the concentration of 25-hydroxy-Vitamin D. Values may decrease during winter months and increase during summer months. Values 20-29 ug/L may indicate Vitamin D insufficiency and values <20 ug/L may indicate Vitamin D deficiency.    Vitamin D determination is routinely performed by an immunoassay specific for 25 hydroxyvitamin D3.  If an individual is on vitamin D2(ergocalciferol) supplementation, please specify 25 OH vitamin D2 and D3 level determination by LCMSMS test VITD23.     CBC with platelets and differential   Result Value Ref Range    WBC Count 4.1 4.0 - 11.0 10e3/uL    RBC Count 4.45 3.80 - 5.20 10e6/uL    Hemoglobin 14.2 11.7 - 15.7 g/dL    Hematocrit 43.8 35.0 - 47.0 %    MCV 98 78 - 100 fL    MCH 31.9 26.5 - 33.0 pg    MCHC 32.4 31.5 - 36.5 g/dL    RDW 13.6 10.0 - 15.0 %    Platelet Count 211 150 - 450 10e3/uL    % Neutrophils 55 %    % Lymphocytes 30 %    % Monocytes 12 %    % Eosinophils 4 %    % Basophils 0 %    Absolute Neutrophils 2.3 1.6 - 8.3 10e3/uL    Absolute Lymphocytes 1.2 0.8 - 5.3 10e3/uL    Absolute Monocytes 0.5 0.0 - 1.3 10e3/uL    Absolute Eosinophils 0.2 0.0 - 0.7 10e3/uL    Absolute Basophils 0.0 0.0 - 0.2 10e3/uL       If you have any questions or concerns, please call the clinic at the number listed above.        Sincerely,      Javier Jones MD

## 2022-11-15 NOTE — PATIENT INSTRUCTIONS
Increase walking/ exercise    We will send you lab results    Stay on same medications for now    Call / return to clinic with problems/ questions

## 2022-11-16 NOTE — RESULT ENCOUNTER NOTE
"Cholesterol is moderately elevated    Hemoglobin a1c is barely into the \"prediabetes\" range    No medications needed for these issues; just keep working on healthy diet/exercise and weight loss as you are able    Other labs are okay    Javier Jones MD    "

## 2022-11-17 ENCOUNTER — PATIENT OUTREACH (OUTPATIENT)
Dept: ONCOLOGY | Facility: CLINIC | Age: 82
End: 2022-11-17

## 2022-11-17 NOTE — PROGRESS NOTES
New Patient Hematology / Oncology Nurse Navigator Note     Referral Date: 11/16/22    Referring provider:  Steve Martin MD   [address] [facility]   [facility] [place] [address]   Phone: [phone]   Fax: [phone]       Referring Clinic/Organization: [facility]     Referred to: GynOnc    Requested provider (if applicable): First available - did not specify     Evaluation for :   Vulvar lesion   Basal cell carcinoma (BCC) of vulva        Clinical History (per Nurse review of records provided):       11/9 Path showing:  Final Diagnosis   A.  Vulva, left labia majora, biopsy:  -Basal cell carcinoma, nodular type, present at resection margin.  -See comment.   -- BOOKMARKED    11/9 Office Visit OBGYN -- BOOKMARKED    Clinical Assessment / Barriers to Care (Per Nurse):  Pt lives in [facility]     Records Location: [facility]     Records Needed:   N/A    Additional testing needed prior to consult:   N/A    Referral updates and Plan:   OUTGOING CALL to pt, no answer.     LVM introducing my role as nurse navigator with [facility] and that we have recd the referral from Dr Martin.    Explained to pt that he/she will receive a call from our scheduling intake team and provided our call-back number below if needed. Routed to NPS as urgent requesting appointment within 1-2 weeks pending pt location preference.      Alysa Stovall, BSN, RN, PHN, OCN  Hematology/Oncology Nurse Navigator  [facility]  [phone]

## 2022-11-18 NOTE — PROGRESS NOTES
RECORDS STATUS - ALL OTHER DIAGNOSIS    Vulvar lesion   Basal cell carcinoma (BCC) of vulva   RECORDS RECEIVED FROM: [facility]   DATE RECEIVED: 11/22/2022   NOTES STATUS DETAILS   OFFICE NOTE from referring provider Complete Epic   Steve Martin MD   DISCHARGE SUMMARY from hospital     DISCHARGE REPORT from the ER     OPERATIVE REPORT Complete See Biopsy in EPIC   MEDICATION LIST Complete [facility]   CLINICAL TRIAL TREATMENTS TO DATE     LABS     PATHOLOGY REPORTS Complete- See biopsy in Epic (internal)  11/9/2022  Case: [id number]  A.  Vulva, left labia majora, biopsy:  -Basal cell carcinoma, nodular type, present at resection margin.   ANYTHING RELATED TO DIAGNOSIS Complete Labs last updated on 11/15/2022    GENONOMIC TESTING     TYPE:     IMAGING (NEED IMAGES & REPORT)     CT SCANS     MRI     MAMMO     ULTRASOUND     PET

## 2022-11-21 NOTE — PROGRESS NOTES
"Gynecologic Oncology New Patient Consult    Referring provider:    Steve Martin MD  [address] [place] [address]   RE: Hemalatha Handy  : 1940  JEREMIAS: 2022      CC: Vulvar basal cell carcinoma    HPI: Ms Hemalatha Handy is a 82 year old year old female who presents for consultation. She is here today unaccompanied.    She notes that for the past couple of months she's noticed a bump on her left labia with occasional bleeding from the area. Her primary care physician gave her some lotrimin cream however this did not help. She was seen at the ED for ongoing spotting/bleeding and at that time she was told to follow up with a gynecologist. She had the below biopsy, consistent with Basal cell carcinoma    Treatment History:  Presented to primary ob/gyn 2022 due to new vulvar lumps  \"She has had a couple of vulvar lumps.  These are on the left side. They started about 3 months ago.  She might have had them longer.  It started getting irritated and increased a little in size.  She used hot packs and they decreased in size a little. She used lotrimin cream and it did not help. She states she had a VD years ago, that her  gave her. She had cervical cancer in the early  and she had radiation therapy. \" Vulvar biopsy was performed.  Pathology:  A.  Vulva, left labia majora, biopsy:  -Basal cell carcinoma, nodular type, present at resection margin.    Hx of cervical cancer, she notes that she was treated with radiation in the  at the [facility]. She did not follow with an oncologist after this, did have pap smears regularly until about 10-15 years ago, doesn't remember any abnormal pap smears since her radiation. Has not had a pelvic exam in some time        Past Medical History:   Diagnosis Date     Actinic keratosis      Basal cell carcinoma pt unsure    abdomen, and elsewhere     Cervical cancer (H) early 's    stage 3 txd.  she did not have " "hysterectomy     Colorectal polyps 2008    1 Polyp     Hypertension        Past Surgical History:   Procedure Laterality Date     COLONOSCOPY  2008     ENT SURGERY  2012    Left lower lip lesion        OBGYN history and Health Maintenance (Personally reviewed 2022):     Last Pap Smear: see above, hx of cervical cancer  Last Mammogram: 9/3/13 BIRADS-1 negative  Last Colonoscopy: 2014 A sessile polyp was found in the recto-sigmoid colon. The polyp was 4 mm in size and appeared to be hyperplastic. Pathology: no adenomatous tissue.     Medications and allergies reviewed in EPIC    Social History:  Social History     Tobacco Use     Smoking status: Former     Types: Cigarettes     Quit date: 2005     Years since quittin.9     Smokeless tobacco: Never   Substance Use Topics     Alcohol use: Yes     Work: retired LPN, worked until about 5 years ago, worked in nursing home  Lives at home with:  Adán, who is currently going through chemotherapy for biliary tract cancer.     Family History:   The patient's family history is noncontributory    Physical Exam:     /69   Pulse 84   Temp 98.5  F (36.9  C) (Oral)   Ht 1.549 m (5' 1\")   Wt 70.8 kg (156 lb 1.6 oz)   SpO2 97%   BMI 29.49 kg/m    Body mass index is 29.49 kg/m .    General: Alert and oriented, no acute distress  Psych: Mood stable. Linear speech, appropriate affect  GI: No distention. No masses. No hernia.   Lymph: No enlarged lymph nodes in neck or groin  : there is an approximate 2x3cm area just lateral to the left labia majorum that is raised, firm and rubbery. Nontender, not fixed to underlying subcutaneous tissue. There is some bleeding with manipulation. No other abnormalities to external genitalia. Speculum exam revealed atrophic vagina with obliterated vaginal apex, unable to visualize the cervix. Bimanual exam reveals attenuated/atrophic vaginal apex/cervix.    Assessment:    Hemalatha Handy is " a 82 year old woman with remote history of cervical cancer s/p radiation therapy in the 1960s. Presenting with a new diagnosis of left vulvar basal cell carcinoma.       60 minutes spent on the date of the encounter doing chart review, history and exam, documentation and further activities as noted above            Plan:     1.) Vulvar basal cell carcinoma - discussed that basal cell carcinomas are a locally invasive cancer and that the rate of metastasis to lymph nodes is exceedingly low. Discussed that mainstay of treatment is surgical excision and due to very low rate of lymph node metastasis would not recommend lymph node mapping or removal. Will plan for wide local excision. Risks, benefits and alternatives to proceed discussed in detail with the patient. Risks include but are not limited to bleeding, infection, possible injury to surrounding organs including distal vagina, urethra, need for second procedure/surgery related to complications from first procedure or inadequate surgical margins, postoperative medical complications such as cardiopulmonary events, thromboembolic events. Also discussed specific risks related to the procedure including wound dehiscence/infection requiring wound care & packing (50%). Consent for surgery, blood transfusion signed. Will arrange appropriate preoperative blood work, CXR, EKG. She will have to undergo medical clearance prior to surgery. Patient also advised on need for postoperative surveillance and/or adjuvant therapy including radiation therapy. Discussed postoperative stay, care requirements. Discussed possible need for home health care, TCU pending on her recovery.     2.) Hx of Cervical cancer s/p definitive radiation treatment. No evidence of disease on exam today.     3.) Genetic risk factors were assessed: patient does not meet criteria     Jenni Melton MD  Gynecologic Oncology  [facility]

## 2022-11-22 ENCOUNTER — PRE VISIT (OUTPATIENT)
Dept: ONCOLOGY | Facility: CLINIC | Age: 82
End: 2022-11-22

## 2022-11-22 ENCOUNTER — ONCOLOGY VISIT (OUTPATIENT)
Dept: ONCOLOGY | Facility: CLINIC | Age: 82
End: 2022-11-22
Attending: OBSTETRICS & GYNECOLOGY
Payer: COMMERCIAL

## 2022-11-22 VITALS
OXYGEN SATURATION: 97 % | BODY MASS INDEX: 29.47 KG/M2 | TEMPERATURE: 98.5 F | WEIGHT: 156.1 LBS | SYSTOLIC BLOOD PRESSURE: 113 MMHG | HEART RATE: 84 BPM | HEIGHT: 61 IN | DIASTOLIC BLOOD PRESSURE: 69 MMHG

## 2022-11-22 DIAGNOSIS — N90.89 VULVAR LESION: ICD-10-CM

## 2022-11-22 DIAGNOSIS — C51.9: ICD-10-CM

## 2022-11-22 PROCEDURE — 99205 OFFICE O/P NEW HI 60 MIN: CPT | Performed by: OBSTETRICS & GYNECOLOGY

## 2022-11-22 PROCEDURE — G0463 HOSPITAL OUTPT CLINIC VISIT: HCPCS

## 2022-11-22 ASSESSMENT — PAIN SCALES - GENERAL: PAINLEVEL: NO PAIN (0)

## 2022-11-22 NOTE — NURSING NOTE
"Oncology Rooming Note    November 22, 2022 10:01 AM   Hemalatha Handy is a 82 year old female who presents for:    Chief Complaint   Patient presents with     Oncology Clinic Visit     Critical access hospital for Vulvar Cancer     Initial Vitals: Blood Pressure 113/69   Pulse 84   Temperature 98.5  F (36.9  C) (Oral)   Height 1.549 m (5' 1\")   Weight 70.8 kg (156 lb 1.6 oz)   Oxygen Saturation 97%   Body Mass Index 29.49 kg/m   Estimated body mass index is 29.49 kg/m  as calculated from the following:    Height as of this encounter: 1.549 m (5' 1\").    Weight as of this encounter: 70.8 kg (156 lb 1.6 oz). Body surface area is 1.75 meters squared.  No Pain (0) Comment: Data Unavailable   No LMP recorded. Patient is postmenopausal.  Allergies reviewed: Yes  Medications reviewed: Yes    Medications: Medication refills not needed today.  Pharmacy name entered into [facility]:    [id number] [address] [place] [address] 10 AT [address] [facility]ACY [facility]IL DELIVERY - [facility] [address]    Clinical concerns: none       Va Sigala MA            "

## 2022-11-22 NOTE — LETTER
"    2022         RE: Hemalatha Handy  [address]  [facility] [address]        Dear Colleague,    Thank you for referring your patient, Hemalatha Handy, to the [facility]. Please see a copy of my visit note below.    Gynecologic Oncology New Patient Consult    Referring provider:    Steve Martin MD  [address] [place] [address]   RE: Hemalatha Handy  : 1940  JEREMIAS: 2022      CC: Vulvar basal cell carcinoma    HPI: Ms Hemalatha Handy is a 82 year old year old female who presents for consultation. She is here today unaccompanied.    She notes that for the past couple of months she's noticed a bump on her left labia with occasional bleeding from the area. Her primary care physician gave her some lotrimin cream however this did not help. She was seen at the ED for ongoing spotting/bleeding and at that time she was told to follow up with a gynecologist. She had the below biopsy, consistent with Basal cell carcinoma    Treatment History:  Presented to primary ob/gyn 2022 due to new vulvar lumps  \"She has had a couple of vulvar lumps.  These are on the left side. They started about 3 months ago.  She might have had them longer.  It started getting irritated and increased a little in size.  She used hot packs and they decreased in size a little. She used lotrimin cream and it did not help. She states she had a VD years ago, that her  gave her. She had cervical cancer in the early  and she had radiation therapy. \" Vulvar biopsy was performed.  Pathology:  A.  Vulva, left labia majora, biopsy:  -Basal cell carcinoma, nodular type, present at resection margin.    Hx of cervical cancer, she notes that she was treated with radiation in the  at the [facility]. She did not follow with an oncologist after this, did have pap smears regularly until about 10-15 years ago, doesn't remember any abnormal " "pap smears since her radiation. Has not had a pelvic exam in some time        Past Medical History:   Diagnosis Date     Actinic keratosis      Basal cell carcinoma pt unsure    abdomen, and elsewhere     Cervical cancer (H) early 1960's    stage 3 txd.  she did not have hysterectomy     Colorectal polyps 2008    1 Polyp     Hypertension        Past Surgical History:   Procedure Laterality Date     COLONOSCOPY  2008     ENT SURGERY  2012    Left lower lip lesion        OBGYN history and Health Maintenance (Personally reviewed 2022):     Last Pap Smear: see above, hx of cervical cancer  Last Mammogram: 9/3/13 BIRADS-1 negative  Last Colonoscopy: 2014 A sessile polyp was found in the recto-sigmoid colon. The polyp was 4 mm in size and appeared to be hyperplastic. Pathology: no adenomatous tissue.     Medications and allergies reviewed in EPIC    Social History:  Social History     Tobacco Use     Smoking status: Former     Types: Cigarettes     Quit date: 2005     Years since quittin.9     Smokeless tobacco: Never   Substance Use Topics     Alcohol use: Yes     Work: retired LPN, worked until about 5 years ago, worked in nursing home  Lives at home with:  Adán, who is currently going through chemotherapy for biliary tract cancer.     Family History:   The patient's family history is noncontributory    Physical Exam:     /69   Pulse 84   Temp 98.5  F (36.9  C) (Oral)   Ht 1.549 m (5' 1\")   Wt 70.8 kg (156 lb 1.6 oz)   SpO2 97%   BMI 29.49 kg/m    Body mass index is 29.49 kg/m .    General: Alert and oriented, no acute distress  Psych: Mood stable. Linear speech, appropriate affect  GI: No distention. No masses. No hernia.   Lymph: No enlarged lymph nodes in neck or groin  : there is an approximate 2x3cm area just lateral to the left labia majorum that is raised, firm and rubbery. Nontender, not fixed to underlying subcutaneous tissue. There is some " bleeding with manipulation. No other abnormalities to external genitalia. Speculum exam revealed atrophic vagina with obliterated vaginal apex, unable to visualize the cervix. Bimanual exam reveals attenuated/atrophic vaginal apex/cervix.    Assessment:    Hemalatha Handy is a 82 year old woman with remote history of cervical cancer s/p radiation therapy in the 1960s. Presenting with a new diagnosis of left vulvar basal cell carcinoma.       60 minutes spent on the date of the encounter doing chart review, history and exam, documentation and further activities as noted above            Plan:     1.) Vulvar basal cell carcinoma - discussed that basal cell carcinomas are a locally invasive cancer and that the rate of metastasis to lymph nodes is exceedingly low. Discussed that mainstay of treatment is surgical excision and due to very low rate of lymph node metastasis would not recommend lymph node mapping or removal. Will plan for wide local excision. Risks, benefits and alternatives to proceed discussed in detail with the patient. Risks include but are not limited to bleeding, infection, possible injury to surrounding organs including distal vagina, urethra, need for second procedure/surgery related to complications from first procedure or inadequate surgical margins, postoperative medical complications such as cardiopulmonary events, thromboembolic events. Also discussed specific risks related to the procedure including wound dehiscence/infection requiring wound care & packing (50%). Consent for surgery, blood transfusion signed. Will arrange appropriate preoperative blood work, CXR, EKG. She will have to undergo medical clearance prior to surgery. Patient also advised on need for postoperative surveillance and/or adjuvant therapy including radiation therapy. Discussed postoperative stay, care requirements. Discussed possible need for home health care, TCU pending on her recovery.     2.) Hx of Cervical cancer  s/p definitive radiation treatment. No evidence of disease on exam today.     3.) Genetic risk factors were assessed: patient does not meet criteria     Jenni Melton MD  Gynecologic Oncology  [facility]

## 2022-11-22 NOTE — PATIENT INSTRUCTIONS
Recommend pelvic examination under anesthesia, wide local excision left vulva.    You will be contacted with a surgical date.    Jenni Melton MD  Gynecologic Oncology  [facility]

## 2022-12-05 ENCOUNTER — TELEPHONE (OUTPATIENT)
Dept: FAMILY MEDICINE | Facility: CLINIC | Age: 82
End: 2022-12-05

## 2022-12-05 NOTE — LETTER
[facility]  [address]  [place] [address]  [phone]            2022    To whom it may concern:    Hemalatha Handy  40 is a long term patient of mine. She had a shot many years ago that would cause her ppd/mantoux test to remain positive.  Thus the test does not provide good information.  She has no evidence/ symptoms  of tuberculosis and should not be required to get the test.  No other testing needed.  No worrisome exposure.            Sincerely,                  Javier Jones MD

## 2022-12-05 NOTE — TELEPHONE ENCOUNTER
Reason for Call:  Other Patient requesting letter    Detailed comments: Patient is requesting a letter from Dr. Jones stating the reason why she can't take a Mantoux.  Patient believes you wrote a letter like this for her before.  Please mail the letter in the enclosed envelope.    Phone Number Patient can be reached at: Other phone number:  [phone]    Best Time: anytime    Can we leave a detailed message on this number? YES    Call taken on 12/5/2022 at 12:39 PM by Mili Watts

## 2022-12-08 ENCOUNTER — PREP FOR PROCEDURE (OUTPATIENT)
Dept: ONCOLOGY | Facility: CLINIC | Age: 82
End: 2022-12-08

## 2022-12-08 DIAGNOSIS — C51.9 VULVAR CANCER (H): Primary | ICD-10-CM

## 2022-12-08 NOTE — PROGRESS NOTES
Orders placed for OR on 1/11/23    Diagnosis:  Basal cell cancer  Vulva    Procedure:  Pelvic exam  under anesthesia, wide local excision left vulva    Needs preop teaching, PAC, consent day of surgery.    [facility], schedule after 9am    Jenni Melton MD  Gynecologic Oncology  [facility]

## 2022-12-19 ENCOUNTER — TELEPHONE (OUTPATIENT)
Dept: ONCOLOGY | Facility: CLINIC | Age: 82
End: 2022-12-19

## 2022-12-19 ENCOUNTER — PATIENT OUTREACH (OUTPATIENT)
Dept: ONCOLOGY | Facility: CLINIC | Age: 82
End: 2022-12-19

## 2022-12-19 PROBLEM — C51.9 VULVAR CANCER (H): Status: ACTIVE | Noted: 2022-12-19

## 2022-12-19 NOTE — PROGRESS NOTES
RN reached out to patient per surgery schedulers update/concern.     Patient questions/concerns: Patient stated that the affected areas are no longer bleeding or hurting. Will notify RNCC.     Patient states this is not a concern she just wanted to pass along the information.     Patient understands why we need to go to surgery even if she is not having any pain or bleeding.     Patient just wanted to pass along this information.     Patient had no other questions or concerns.     Patient appreciative of RN time     Alena Tolliver RN

## 2022-12-19 NOTE — TELEPHONE ENCOUNTER
----- Message from Jenni Melton MD sent at 12/8/2022  4:01 PM CST -----  Regarding: Surgery orders for 1/11 [facility]  Orders placed for OR on 1/11/23    Diagnosis:  Basal cell cancer  Vulva    Procedure:  Pelvic exam  under anesthesia, wide local excision left vulva    Needs preop teaching, PAC, consent day of surgery.    [facility], schedule after 9am (to follow Jasmine)    Jenni Melton MD  Gynecologic Oncology  [facility]

## 2022-12-19 NOTE — TELEPHONE ENCOUNTER
RN Care Coordinator: Laura Tolliver; [phone]    Surgery is scheduled with Dr. Melton   Date: 1/11   [facility]  Scheduled per: surgeon requested date      H&P to be completed by PAC clinic on 12/30     Post-op: 1/24, in person visit    Patient will receive surgery arrival and start time from PAC.      Spoke with the patient and was able to confirm all scheduled information.       Patient questions/concerns: Patient stated that the affected areas are no longer bleeding or hurting. Will notify RNCC.       Surgery packet was provided by the RNCC during appointment    __    Beatriz Brown, Senior Perioperative Coordinator, on 12/19/2022 on 1:14 PM  P: [phone]

## 2022-12-21 NOTE — TELEPHONE ENCOUNTER
FUTURE VISIT INFORMATION      SURGERY INFORMATION:    Date: 23    Location: [facility]    Surgeon:  Jenni Melton MD    Anesthesia Type:  general    Procedure: left pelvic exam  under anesthesia, wide local excision left vulva    Consult: ov 22    RECORDS REQUESTED FROM:       Primary Care [facility]    Pertinent Medical History: hypertension    Most recent EKG+ Tracin12- Lia

## 2022-12-30 ENCOUNTER — ANESTHESIA EVENT (OUTPATIENT)
Dept: SURGERY | Facility: AMBULATORY SURGERY CENTER | Age: 82
End: 2022-12-30
Payer: COMMERCIAL

## 2022-12-30 ENCOUNTER — PRE VISIT (OUTPATIENT)
Dept: SURGERY | Facility: CLINIC | Age: 82
End: 2022-12-30

## 2022-12-30 ENCOUNTER — OFFICE VISIT (OUTPATIENT)
Dept: SURGERY | Facility: CLINIC | Age: 82
End: 2022-12-30
Payer: COMMERCIAL

## 2022-12-30 VITALS
OXYGEN SATURATION: 95 % | SYSTOLIC BLOOD PRESSURE: 118 MMHG | RESPIRATION RATE: 16 BRPM | DIASTOLIC BLOOD PRESSURE: 74 MMHG | BODY MASS INDEX: 29.81 KG/M2 | TEMPERATURE: 98 F | HEART RATE: 92 BPM | HEIGHT: 61 IN | WEIGHT: 157.9 LBS

## 2022-12-30 DIAGNOSIS — Z01.818 PREOP EXAMINATION: Primary | ICD-10-CM

## 2022-12-30 PROCEDURE — 99204 OFFICE O/P NEW MOD 45 MIN: CPT | Performed by: PHYSICIAN ASSISTANT

## 2022-12-30 RX ORDER — MULTIPLE VITAMINS W/ MINERALS TAB 9MG-400MCG
1 TAB ORAL DAILY
COMMUNITY

## 2022-12-30 ASSESSMENT — ENCOUNTER SYMPTOMS: SEIZURES: 0

## 2022-12-30 ASSESSMENT — PAIN SCALES - GENERAL: PAINLEVEL: NO PAIN (0)

## 2022-12-30 ASSESSMENT — LIFESTYLE VARIABLES: TOBACCO_USE: 1

## 2022-12-30 NOTE — H&P (VIEW-ONLY)
Pre-Operative H & P     CC:  Preoperative exam to assess for increased cardiopulmonary risk while undergoing surgery and anesthesia.    Date of Encounter: 12/30/2022  Primary Care Physician:  Javier Jones     Reason for Visit: Vulvar cancer (H)    HPI  Hemalatha Handy is a 82 year old female who presents for pre-operative H & P in preparation for  Procedure Information     Case: [id number] Date/Time: 01/11/23 0850    Procedure: left pelvic exam  under anesthesia, wide local excision left vulva (Left: Vulva)    Anesthesia type: General    Diagnosis: Vulvar cancer (H) [C51.9]    Pre-op diagnosis: Vulvar cancer (H) [C51.9]    Location: [place] [address] / [facility]    Providers: Jenni Melton MD          Patient is being evaluated for comorbid conditions of HTN, HLD, former tobacco use, anxiety, GERD.    Ms. Handy has a history of cervical cancer that was treated with radiation in the 1960s at the [facility]. She did not follow with an oncologist after this and did have pap smears regularly until about 10-15 years ago. She was recently evaluated for a bump on her left labia with occasional bleeding from the area. She has been found to have Vulvar cancer and now presents for the above procedure.    History was obtained from patient & chart review.    Hx of abnormal bleeding or anti-platelet use: denies    Menstrual history: No LMP recorded (lmp unknown). Patient is postmenopausal.:       Past Medical History  Past Medical History:   Diagnosis Date     Actinic keratosis      Anxiety      Basal cell carcinoma pt unsure    abdomen, and elsewhere     Cervical cancer (H) early 1960's    stage 3 txd.  she did not have hysterectomy     Colorectal polyps 01/01/2008    1 Polyp     Gastroesophageal reflux disease without esophagitis      HLD (hyperlipidemia)      Hypertension      Vulvar cancer (H) 11/2022       Past Surgical History  Past Surgical History:   Procedure  Laterality Date     COLONOSCOPY  2008     ENT SURGERY  2012    Left lower lip lesion       Prior to Admission Medications  Current Outpatient Medications   Medication Sig Dispense Refill     ALPRAZolam (XANAX) 0.25 MG tablet TAKE 1 TABLET(0.25 MG) BY MOUTH THREE TIMES DAILY AS NEEDED FOR ANXIETY 30 tablet 0     chlorhexidine (PERIDEX) 0.12 % solution SWISH AND SPIT 15 ML BY MOUTH TWICE DAILY AS NEEDED 473 mL 1     citalopram (CELEXA) 20 MG tablet TAKE 1 TABLET(20 MG) BY MOUTH DAILY (Patient taking differently: Take 20 mg by mouth every morning) 90 tablet 1     clotrimazole (LOTRIMIN) 1 % external cream Apply topically 2 times daily as needed (fungal rash) 60 g 1     lisinopril (ZESTRIL) 10 MG tablet TAKE 1 TABLET(10 MG) BY MOUTH DAILY (Patient taking differently: Take 10 mg by mouth every morning) 90 tablet 0     meclizine (ANTIVERT) 25 MG tablet Take 1 tablet (25 mg) by mouth 3 times daily as needed for dizziness 30 tablet 2     MOTRIN 800 MG OR TABS Take by mouth every 6 hours as needed       multivitamin w/minerals (MULTI-VITAMIN) tablet Take 1 tablet by mouth daily         Allergies  Allergies   Allergen Reactions     Amoxicillin Hives     Asa [Aspirin]      Evista [Raloxifene Hydrochloride]      Prempro      Raloxifene Hives and Rash       Social History  Social History     Socioeconomic History     Marital status: Single     Spouse name: Not on file     Number of children: Not on file     Years of education: Not on file     Highest education level: Not on file   Occupational History     Employer: [employer]     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 No   Social History Narrative     Not on file     Social Determinants of Health     Financial Resource Strain: Not on file   Food Insecurity: Not on file   Transportation Needs: Not on file   Physical Activity: Not on file   Stress: Not on file   Social Connections: Not on file   Intimate Partner Violence: Not on file   Housing Stability: Not on file       Family History  Family History   Problem Relation Age of Onset     Hypertension Mother      Hypertension Brother      Cancer Son         hodgkins     C.A.D. No family hx of      Cerebrovascular Disease No family hx of      Anesthesia Reaction No family hx of      Deep Vein Thrombosis (DVT) No family hx of        Review of Systems  The complete review of systems is negative other than noted in the HPI or here.     Anesthesia Evaluation   Pt has had prior anesthetic. Type: MAC.    No history of anesthetic complications       ROS/MED HX  ENT/Pulmonary:     (+) tobacco use, Past use,  (-) asthma and sleep apnea   Neurologic: Comment: Mild hearing loss  Tinnitus     (-) no seizures and no CVA   Cardiovascular:     (+) Dyslipidemia hypertension-----    METS/Exercise Tolerance: 3 - Able to walk 1-2 blocks without stopping Comment: Activity limited due to hip pain     Hematologic:  - neg hematologic  ROS  (-) history of blood clots and history of blood transfusion   Musculoskeletal: Comment: Mild diffuse arthritis  (+) arthritis,     GI/Hepatic:  - neg GI/hepatic ROS  (-) GERD and liver disease   Renal/Genitourinary: Comment: Vulvar cancer   (-) renal disease   Endo:  - neg endo ROS  (-) Type II DM   Psychiatric/Substance Use:     (+) psychiatric history anxiety     Infectious Disease:  - neg infectious disease ROS     Malignancy:   (+) Malignancy, History of Other.Other CA Vulvar Active status post.    Other:  - neg other ROS          /74 (BP  "Location: Right arm, Patient Position: Sitting, Cuff Size: Adult Regular)   Pulse 92   Temp 98  F (36.7  C) (Oral)   Resp 16   Ht 1.549 m (5' 1\")   Wt 71.6 kg (157 lb 14.4 oz)   LMP  (LMP Unknown)   SpO2 95%   Breastfeeding No   BMI 29.83 kg/m      Physical Exam  Constitutional: Awake, alert, cooperative, no apparent distress, and appears stated age.  Eyes: Pupils equal, round and reactive to light, extra ocular muscles intact, sclera clear, conjunctiva normal.  HENT: Normocephalic, oral pharynx with moist mucus membranes, good dentition. No goiter appreciated. No removable dental hardware.  Respiratory: Clear to auscultation bilaterally, no crackles or wheezing. No SOB when supine.  Cardiovascular: Regular rate and rhythm, normal S1 and S2, and no murmur noted.  Carotids +2, no bruits. No edema. Palpable pulses to radial, DP and PT arteries.   GI: Normal bowel sounds, soft, non-distended, non-tender, no masses palpated.    Lymph/Hematologic: No cervical lymphadenopathy and no supraclavicular lymphadenopathy.  Genitourinary:  deferred  Skin: Warm and dry.  No rashes.   Musculoskeletal: Limited extension ROM of neck. There is no redness, warmth, or swelling of the joints. Gross motor strength is normal.    Neurologic: Awake, alert, oriented to name, place and time. Cranial nerves II-XII are grossly intact. Gait is normal. Ambulates from chair to exam table, seats self, lies supine and sits back up w/o assistance.  Neuropsychiatric: Calm, cooperative. Normal affect. Pleasant. Answers questions appropriately, follows commands w/o difficulty.        PRIOR LABS/DIAGNOSTIC STUDIES:    All labs and imaging personally reviewed      PATHOLOGY 11/9/22  Specimen:    Vulva, left labia majora, x 2                                                            Final Diagnosis  A.  Vulva, left labia majora, biopsy:  -Basal cell carcinoma, nodular type, present at resection margin.      The patient's records and results " "personally reviewed by this provider.       Assessment      Hemalatha Handy is a 82 year old female seen as a PAC referral for risk assessment and optimization for anesthesia.    Plan/Recommendations  Pt will be optimized for the proposed procedure.  See below for details on the assessment, risk, and preoperative recommendations    NEUROLOGY  - No history of TIA, CVA or seizure    -Post Op delirium risk factors:  Age    ENT  - No current airway concerns.  Will need to be reassessed day of surgery.  Mallampati: II  TM: > 3    CARDIAC  - No history of CAD and Afib   - HTN, will hold lisinopril DOS    - METS (Metabolic Equivalents)  Activity limited due to hip pain    Patient CANNOT perform 4 METS exercise without symptoms            Total Score: 1    Functional Capacity: Unable to complete 4 METS      RCRI-Very low risk: Class 1 0.4% complication rate            Total Score: 0        PULMONARY  ADONIS Low Risk            Total Score: 2    ADONIS: Hypertension    ADONIS: Over 50 ys old      - Denies asthma or inhaler use  - Tobacco History      History   Smoking Status     Former     Packs/day: 1.00     Years: 45.00     Types: Cigarettes     Quit date: 1/1/2005   Smokeless Tobacco     Never       GI  - Denies GERD  PONV High Risk  Total Score: 3           1 AN PONV: Pt is Female    1 AN PONV: Patient is not a current smoker    1 AN PONV: Intended Post Op Opioids        /RENAL  - Vulvar cancer    ENDOCRINE    - BMI: Estimated body mass index is 29.83 kg/m  as calculated from the following:    Height as of this encounter: 1.549 m (5' 1\").    Weight as of this encounter: 71.6 kg (157 lb 14.4 oz).  Overweight (BMI 25.0-29.9)  - No history of Diabetes Mellitus    HEME  VTE Medium Risk 1.8%            Total Score: 6    VTE: Greater than 59 yrs old    VTE: Current cancer      - No history of abnormal bleeding or antiplatelet use.        The patient is aware that the final anesthesia plan will be decided by the assigned anesthesia " provider on the date of service.    The patient is optimized for their procedure. AVS with information on surgery time/arrival time, meds and NPO status given by nursing staff. No further diagnostic testing indicated.      On the day of service:     Prep time: 14 minutes  Visit time: 23 minutes  Documentation time: 13 minutes  ------------------------------------------  Total time: 50 minutes      Pilar Coates PA-C  Preoperative Assessment Center  [facility]  Phone: [phone]  Fax: [phone]

## 2022-12-30 NOTE — H&P
Pre-Operative H & P     CC:  Preoperative exam to assess for increased cardiopulmonary risk while undergoing surgery and anesthesia.    Date of Encounter: 12/30/2022  Primary Care Physician:  Javier Jones     Reason for Visit: Vulvar cancer (H)    HPI  Hemalatha Handy is a 82 year old female who presents for pre-operative H & P in preparation for  Procedure Information     Case: [id number] Date/Time: 01/11/23 0850    Procedure: left pelvic exam  under anesthesia, wide local excision left vulva (Left: Vulva)    Anesthesia type: General    Diagnosis: Vulvar cancer (H) [C51.9]    Pre-op diagnosis: Vulvar cancer (H) [C51.9]    Location: [place] [address] / [facility]    Providers: Jenni Melton MD          Patient is being evaluated for comorbid conditions of HTN, HLD, former tobacco use, anxiety, GERD.    Ms. Handy has a history of cervical cancer that was treated with radiation in the 1960s at the [facility]. She did not follow with an oncologist after this and did have pap smears regularly until about 10-15 years ago. She was recently evaluated for a bump on her left labia with occasional bleeding from the area. She has been found to have Vulvar cancer and now presents for the above procedure.    History was obtained from patient & chart review.    Hx of abnormal bleeding or anti-platelet use: denies    Menstrual history: No LMP recorded (lmp unknown). Patient is postmenopausal.:       Past Medical History  Past Medical History:   Diagnosis Date     Actinic keratosis      Anxiety      Basal cell carcinoma pt unsure    abdomen, and elsewhere     Cervical cancer (H) early 1960's    stage 3 txd.  she did not have hysterectomy     Colorectal polyps 01/01/2008    1 Polyp     Gastroesophageal reflux disease without esophagitis      HLD (hyperlipidemia)      Hypertension      Vulvar cancer (H) 11/2022       Past Surgical History  Past Surgical History:   Procedure  Laterality Date     COLONOSCOPY  2008     ENT SURGERY  2012    Left lower lip lesion       Prior to Admission Medications  Current Outpatient Medications   Medication Sig Dispense Refill     ALPRAZolam (XANAX) 0.25 MG tablet TAKE 1 TABLET(0.25 MG) BY MOUTH THREE TIMES DAILY AS NEEDED FOR ANXIETY 30 tablet 0     chlorhexidine (PERIDEX) 0.12 % solution SWISH AND SPIT 15 ML BY MOUTH TWICE DAILY AS NEEDED 473 mL 1     citalopram (CELEXA) 20 MG tablet TAKE 1 TABLET(20 MG) BY MOUTH DAILY (Patient taking differently: Take 20 mg by mouth every morning) 90 tablet 1     clotrimazole (LOTRIMIN) 1 % external cream Apply topically 2 times daily as needed (fungal rash) 60 g 1     lisinopril (ZESTRIL) 10 MG tablet TAKE 1 TABLET(10 MG) BY MOUTH DAILY (Patient taking differently: Take 10 mg by mouth every morning) 90 tablet 0     meclizine (ANTIVERT) 25 MG tablet Take 1 tablet (25 mg) by mouth 3 times daily as needed for dizziness 30 tablet 2     MOTRIN 800 MG OR TABS Take by mouth every 6 hours as needed       multivitamin w/minerals (MULTI-VITAMIN) tablet Take 1 tablet by mouth daily         Allergies  Allergies   Allergen Reactions     Amoxicillin Hives     Asa [Aspirin]      Evista [Raloxifene Hydrochloride]      Prempro      Raloxifene Hives and Rash       Social History  Social History     Socioeconomic History     Marital status: Single     Spouse name: Not on file     Number of children: Not on file     Years of education: Not on file     Highest education level: Not on file   Occupational History     Employer: [employer]     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 No   Social History Narrative     Not on file     Social Determinants of Health     Financial Resource Strain: Not on file   Food Insecurity: Not on file   Transportation Needs: Not on file   Physical Activity: Not on file   Stress: Not on file   Social Connections: Not on file   Intimate Partner Violence: Not on file   Housing Stability: Not on file       Family History  Family History   Problem Relation Age of Onset     Hypertension Mother      Hypertension Brother      Cancer Son         hodgkins     C.A.D. No family hx of      Cerebrovascular Disease No family hx of      Anesthesia Reaction No family hx of      Deep Vein Thrombosis (DVT) No family hx of        Review of Systems  The complete review of systems is negative other than noted in the HPI or here.     Anesthesia Evaluation   Pt has had prior anesthetic. Type: MAC.    No history of anesthetic complications       ROS/MED HX  ENT/Pulmonary:     (+) tobacco use, Past use,  (-) asthma and sleep apnea   Neurologic: Comment: Mild hearing loss  Tinnitus     (-) no seizures and no CVA   Cardiovascular:     (+) Dyslipidemia hypertension-----    METS/Exercise Tolerance: 3 - Able to walk 1-2 blocks without stopping Comment: Activity limited due to hip pain     Hematologic:  - neg hematologic  ROS  (-) history of blood clots and history of blood transfusion   Musculoskeletal: Comment: Mild diffuse arthritis  (+) arthritis,     GI/Hepatic:  - neg GI/hepatic ROS  (-) GERD and liver disease   Renal/Genitourinary: Comment: Vulvar cancer   (-) renal disease   Endo:  - neg endo ROS  (-) Type II DM   Psychiatric/Substance Use:     (+) psychiatric history anxiety     Infectious Disease:  - neg infectious disease ROS     Malignancy:   (+) Malignancy, History of Other.Other CA Vulvar Active status post.    Other:  - neg other ROS          /74 (BP  "Location: Right arm, Patient Position: Sitting, Cuff Size: Adult Regular)   Pulse 92   Temp 98  F (36.7  C) (Oral)   Resp 16   Ht 1.549 m (5' 1\")   Wt 71.6 kg (157 lb 14.4 oz)   LMP  (LMP Unknown)   SpO2 95%   Breastfeeding No   BMI 29.83 kg/m      Physical Exam  Constitutional: Awake, alert, cooperative, no apparent distress, and appears stated age.  Eyes: Pupils equal, round and reactive to light, extra ocular muscles intact, sclera clear, conjunctiva normal.  HENT: Normocephalic, oral pharynx with moist mucus membranes, good dentition. No goiter appreciated. No removable dental hardware.  Respiratory: Clear to auscultation bilaterally, no crackles or wheezing. No SOB when supine.  Cardiovascular: Regular rate and rhythm, normal S1 and S2, and no murmur noted.  Carotids +2, no bruits. No edema. Palpable pulses to radial, DP and PT arteries.   GI: Normal bowel sounds, soft, non-distended, non-tender, no masses palpated.    Lymph/Hematologic: No cervical lymphadenopathy and no supraclavicular lymphadenopathy.  Genitourinary:  deferred  Skin: Warm and dry.  No rashes.   Musculoskeletal: Limited extension ROM of neck. There is no redness, warmth, or swelling of the joints. Gross motor strength is normal.    Neurologic: Awake, alert, oriented to name, place and time. Cranial nerves II-XII are grossly intact. Gait is normal. Ambulates from chair to exam table, seats self, lies supine and sits back up w/o assistance.  Neuropsychiatric: Calm, cooperative. Normal affect. Pleasant. Answers questions appropriately, follows commands w/o difficulty.        PRIOR LABS/DIAGNOSTIC STUDIES:    All labs and imaging personally reviewed      PATHOLOGY 11/9/22  Specimen:    Vulva, left labia majora, x 2                                                            Final Diagnosis  A.  Vulva, left labia majora, biopsy:  -Basal cell carcinoma, nodular type, present at resection margin.      The patient's records and results " "personally reviewed by this provider.       Assessment      Hemalatha Handy is a 82 year old female seen as a PAC referral for risk assessment and optimization for anesthesia.    Plan/Recommendations  Pt will be optimized for the proposed procedure.  See below for details on the assessment, risk, and preoperative recommendations    NEUROLOGY  - No history of TIA, CVA or seizure    -Post Op delirium risk factors:  Age    ENT  - No current airway concerns.  Will need to be reassessed day of surgery.  Mallampati: II  TM: > 3    CARDIAC  - No history of CAD and Afib   - HTN, will hold lisinopril DOS    - METS (Metabolic Equivalents)  Activity limited due to hip pain    Patient CANNOT perform 4 METS exercise without symptoms            Total Score: 1    Functional Capacity: Unable to complete 4 METS      RCRI-Very low risk: Class 1 0.4% complication rate            Total Score: 0        PULMONARY  ADONIS Low Risk            Total Score: 2    ADONIS: Hypertension    ADONIS: Over 50 ys old      - Denies asthma or inhaler use  - Tobacco History      History   Smoking Status     Former     Packs/day: 1.00     Years: 45.00     Types: Cigarettes     Quit date: 1/1/2005   Smokeless Tobacco     Never       GI  - Denies GERD  PONV High Risk  Total Score: 3           1 AN PONV: Pt is Female    1 AN PONV: Patient is not a current smoker    1 AN PONV: Intended Post Op Opioids        /RENAL  - Vulvar cancer    ENDOCRINE    - BMI: Estimated body mass index is 29.83 kg/m  as calculated from the following:    Height as of this encounter: 1.549 m (5' 1\").    Weight as of this encounter: 71.6 kg (157 lb 14.4 oz).  Overweight (BMI 25.0-29.9)  - No history of Diabetes Mellitus    HEME  VTE Medium Risk 1.8%            Total Score: 6    VTE: Greater than 59 yrs old    VTE: Current cancer      - No history of abnormal bleeding or antiplatelet use.        The patient is aware that the final anesthesia plan will be decided by the assigned anesthesia " provider on the date of service.    The patient is optimized for their procedure. AVS with information on surgery time/arrival time, meds and NPO status given by nursing staff. No further diagnostic testing indicated.      On the day of service:     Prep time: 14 minutes  Visit time: 23 minutes  Documentation time: 13 minutes  ------------------------------------------  Total time: 50 minutes      Pilar Coates PA-C  Preoperative Assessment Center  [facility]  Clinic and Surgery Center  Phone: [phone]  Fax: [phone]

## 2022-12-30 NOTE — PATIENT INSTRUCTIONS
Preparing for Your Surgery      Name:  Hemalatha Handy   MRN:  [id number]   :  1940   Today's Date:  2022         Arriving for surgery:  Surgery date:  23  Arrival time:  7:30 am    Restrictions due to COVID 19:    2 visitors may accompany each patient  Visitors may wait for patient in the Surgery Center Waiting room  All visitors must wear a mask and socially distance        parking is available for anyone with mobility limitations or disabilities. (Monday- Friday 7 am- 5 pm)    Please come to:    [facility]  [address] [place] [id number]    Check in on the 5th floor, Ambulatory Surgery Center.    What can I eat or drink?    -  You may eat and drink normally until 8 hours before arrival time  (Until 11:30 pm on 1/10/23 - the night before surgery)  -  You may have clear liquids until 2 hours before arrival time  (Until 5:30 am on 23 - the morning of surgery)    Examples of clear liquids:  Water  Clear broth  Juices (apple, white grape, white cranberry  and cider) without pulp  Noncarbonated, powder based beverages  (lemonade and Maurilio-Aid)  Sodas (Sprite, 7-Up, ginger ale and seltzer)  Coffee or tea (without milk or cream)  Gatorade    --No alcohol for at least 24 hours before surgery    Which medicines can I take?    Hold Aspirin for 7 days before surgery.   Hold Multivitamins for 7 days before surgery.  Hold Supplements for 7 days before surgery.  Hold Ibuprofen (Advil, Motrin) for 1 day before surgery--unless otherwise directed by surgeon.  Hold Naproxen (Aleve) for 4 days before surgery.    -  DO NOT take the following medications the day of surgery:  Multivitamin - stop 7 days before surgery  Lisinopril (Zestril)  Motrin - stop 1 day before surgery    -  PLEASE TAKE the following medications the day of surgery   Alprazolam (Xanax) if needed  Citalopram (Celexa)  Meclizine (Antivert) if needed    How do I prepare myself?  - Please take 2  showers before surgery using Scrubcare or Hibiclens soap.    Use this soap only from the neck to your toes.     Leave the soap on your skin for one minute--then rinse thoroughly.      You may use your own shampoo and conditioner; no other hair products.   - Please remove all jewelry and body piercings.  - No lotions, deodorants or fragrance.  - No makeup or fingernail polish.   - Bring your ID and insurance card.    -If you have a Deep Brain Stimulator, a Spinal Cord Stimulator or any implanted Neuro device you must bring the remote to the Surgery Center      ALL PATIENTS ARE REQUIRED TO HAVE A RESPONSIBLE ADULT TO DRIVE AND BE IN ATTENDANCE WITH THEM FOR 24 HOURS FOLLOWING SURGERY       Covid testing policy as of 12/06/2022  Your surgeon will notify and schedule you for a COVID test if one is needed before surgery--please direct any questions or COVID symptoms to your surgeon      Questions or Concerns:    -For questions regarding the day of surgery please contact the Ambulatory Surgery Center at [phone].    -If you have health changes between today and your surgery please contact your surgeon.     For questions after surgery please call your surgeons office.

## 2023-01-01 NOTE — PATIENT INSTRUCTIONS
Use the antifungal cream up to 2x daily as needed to rash left groin area    Call if the frozen lesion not improved in a few weeks    Take daily metamucil    Could use over the counter gas-x ( simethicone ) for gas    Increase walking    Stay well hydrated    Follow up as needed based on symptoms    Be seen promptly if symptoms acutely worsen   
Statement Selected

## 2023-01-10 RX ORDER — FENTANYL CITRATE 50 UG/ML
25 INJECTION, SOLUTION INTRAMUSCULAR; INTRAVENOUS
Status: CANCELLED | OUTPATIENT
Start: 2023-01-10

## 2023-01-11 ENCOUNTER — TELEPHONE (OUTPATIENT)
Dept: ONCOLOGY | Facility: CLINIC | Age: 83
End: 2023-01-11

## 2023-01-11 ENCOUNTER — HOSPITAL ENCOUNTER (OUTPATIENT)
Facility: AMBULATORY SURGERY CENTER | Age: 83
Discharge: HOME | End: 2023-01-11
Attending: OBSTETRICS & GYNECOLOGY
Payer: COMMERCIAL

## 2023-01-11 ENCOUNTER — ANESTHESIA (OUTPATIENT)
Dept: SURGERY | Facility: AMBULATORY SURGERY CENTER | Age: 83
End: 2023-01-11
Payer: COMMERCIAL

## 2023-01-11 VITALS
WEIGHT: 155 LBS | HEIGHT: 61 IN | SYSTOLIC BLOOD PRESSURE: 114 MMHG | DIASTOLIC BLOOD PRESSURE: 50 MMHG | OXYGEN SATURATION: 96 % | RESPIRATION RATE: 16 BRPM | HEART RATE: 81 BPM | BODY MASS INDEX: 29.27 KG/M2 | TEMPERATURE: 97.1 F

## 2023-01-11 DIAGNOSIS — C51.9 VULVAR CANCER (H): Primary | ICD-10-CM

## 2023-01-11 PROCEDURE — 11626 EXC S/N/H/F/G MAL+MRG >4 CM: CPT

## 2023-01-11 PROCEDURE — 88309 TISSUE EXAM BY PATHOLOGIST: CPT | Mod: TC | Performed by: OBSTETRICS & GYNECOLOGY

## 2023-01-11 PROCEDURE — 12042 INTMD RPR N-HF/GENIT2.6-7.5: CPT

## 2023-01-11 RX ORDER — ACETAMINOPHEN 325 MG/1
975 TABLET ORAL ONCE
Status: COMPLETED | OUTPATIENT
Start: 2023-01-11 | End: 2023-01-11

## 2023-01-11 RX ORDER — ONDANSETRON 2 MG/ML
INJECTION INTRAMUSCULAR; INTRAVENOUS PRN
Status: DISCONTINUED | OUTPATIENT
Start: 2023-01-11 | End: 2023-01-11

## 2023-01-11 RX ORDER — SODIUM CHLORIDE, SODIUM LACTATE, POTASSIUM CHLORIDE, CALCIUM CHLORIDE 600; 310; 30; 20 MG/100ML; MG/100ML; MG/100ML; MG/100ML
INJECTION, SOLUTION INTRAVENOUS CONTINUOUS
Status: DISCONTINUED | OUTPATIENT
Start: 2023-01-11 | End: 2023-01-12 | Stop reason: HOSPADM

## 2023-01-11 RX ORDER — SODIUM CHLORIDE, SODIUM LACTATE, POTASSIUM CHLORIDE, CALCIUM CHLORIDE 600; 310; 30; 20 MG/100ML; MG/100ML; MG/100ML; MG/100ML
INJECTION, SOLUTION INTRAVENOUS CONTINUOUS
Status: DISCONTINUED | OUTPATIENT
Start: 2023-01-11 | End: 2023-01-11 | Stop reason: HOSPADM

## 2023-01-11 RX ORDER — LIDOCAINE 40 MG/G
CREAM TOPICAL
Status: DISCONTINUED | OUTPATIENT
Start: 2023-01-11 | End: 2023-01-11 | Stop reason: HOSPADM

## 2023-01-11 RX ORDER — LIDOCAINE HYDROCHLORIDE AND EPINEPHRINE 10; 10 MG/ML; UG/ML
INJECTION, SOLUTION INFILTRATION; PERINEURAL PRN
Status: DISCONTINUED | OUTPATIENT
Start: 2023-01-11 | End: 2023-01-11 | Stop reason: HOSPADM

## 2023-01-11 RX ORDER — CEFAZOLIN SODIUM 2 G/50ML
2 SOLUTION INTRAVENOUS SEE ADMIN INSTRUCTIONS
Status: DISCONTINUED | OUTPATIENT
Start: 2023-01-11 | End: 2023-01-11 | Stop reason: HOSPADM

## 2023-01-11 RX ORDER — PROPOFOL 10 MG/ML
INJECTION, EMULSION INTRAVENOUS CONTINUOUS PRN
Status: DISCONTINUED | OUTPATIENT
Start: 2023-01-11 | End: 2023-01-11

## 2023-01-11 RX ORDER — MEPERIDINE HYDROCHLORIDE 25 MG/ML
12.5 INJECTION INTRAMUSCULAR; INTRAVENOUS; SUBCUTANEOUS
Status: DISCONTINUED | OUTPATIENT
Start: 2023-01-11 | End: 2023-01-12 | Stop reason: HOSPADM

## 2023-01-11 RX ORDER — FENTANYL CITRATE 50 UG/ML
INJECTION, SOLUTION INTRAMUSCULAR; INTRAVENOUS PRN
Status: DISCONTINUED | OUTPATIENT
Start: 2023-01-11 | End: 2023-01-11

## 2023-01-11 RX ORDER — CEFAZOLIN SODIUM 2 G/50ML
2 SOLUTION INTRAVENOUS
Status: COMPLETED | OUTPATIENT
Start: 2023-01-11 | End: 2023-01-11

## 2023-01-11 RX ORDER — ONDANSETRON 4 MG/1
4 TABLET, ORALLY DISINTEGRATING ORAL EVERY 30 MIN PRN
Status: DISCONTINUED | OUTPATIENT
Start: 2023-01-11 | End: 2023-01-12 | Stop reason: HOSPADM

## 2023-01-11 RX ORDER — ACETAMINOPHEN 325 MG/1
975 TABLET ORAL ONCE
Status: DISCONTINUED | OUTPATIENT
Start: 2023-01-11 | End: 2023-01-12 | Stop reason: HOSPADM

## 2023-01-11 RX ORDER — FENTANYL CITRATE 50 UG/ML
50 INJECTION, SOLUTION INTRAMUSCULAR; INTRAVENOUS EVERY 5 MIN PRN
Status: DISCONTINUED | OUTPATIENT
Start: 2023-01-11 | End: 2023-01-12 | Stop reason: HOSPADM

## 2023-01-11 RX ORDER — OXYCODONE HYDROCHLORIDE 5 MG/1
5 TABLET ORAL EVERY 6 HOURS PRN
Qty: 3 TABLET | Refills: 0 | Status: SHIPPED | OUTPATIENT
Start: 2023-01-11 | End: 2023-04-19

## 2023-01-11 RX ORDER — EPHEDRINE SULFATE 50 MG/ML
INJECTION, SOLUTION INTRAMUSCULAR; INTRAVENOUS; SUBCUTANEOUS PRN
Status: DISCONTINUED | OUTPATIENT
Start: 2023-01-11 | End: 2023-01-11

## 2023-01-11 RX ORDER — DIPHENHYDRAMINE HCL 25 MG
25 TABLET ORAL EVERY 6 HOURS PRN
COMMUNITY
End: 2023-04-19

## 2023-01-11 RX ORDER — OXYCODONE HYDROCHLORIDE 5 MG/1
10 TABLET ORAL EVERY 4 HOURS PRN
Status: DISCONTINUED | OUTPATIENT
Start: 2023-01-11 | End: 2023-01-12 | Stop reason: HOSPADM

## 2023-01-11 RX ORDER — IBUPROFEN 200 MG
600 TABLET ORAL ONCE
Status: DISCONTINUED | OUTPATIENT
Start: 2023-01-11 | End: 2023-01-12 | Stop reason: HOSPADM

## 2023-01-11 RX ORDER — LIDOCAINE HYDROCHLORIDE 20 MG/ML
INJECTION, SOLUTION INFILTRATION; PERINEURAL PRN
Status: DISCONTINUED | OUTPATIENT
Start: 2023-01-11 | End: 2023-01-11

## 2023-01-11 RX ORDER — HYDROMORPHONE HYDROCHLORIDE 1 MG/ML
0.4 INJECTION, SOLUTION INTRAMUSCULAR; INTRAVENOUS; SUBCUTANEOUS EVERY 5 MIN PRN
Status: DISCONTINUED | OUTPATIENT
Start: 2023-01-11 | End: 2023-01-12 | Stop reason: HOSPADM

## 2023-01-11 RX ORDER — FENTANYL CITRATE 50 UG/ML
25 INJECTION, SOLUTION INTRAMUSCULAR; INTRAVENOUS EVERY 5 MIN PRN
Status: DISCONTINUED | OUTPATIENT
Start: 2023-01-11 | End: 2023-01-12 | Stop reason: HOSPADM

## 2023-01-11 RX ORDER — HYDROMORPHONE HYDROCHLORIDE 1 MG/ML
0.2 INJECTION, SOLUTION INTRAMUSCULAR; INTRAVENOUS; SUBCUTANEOUS EVERY 5 MIN PRN
Status: DISCONTINUED | OUTPATIENT
Start: 2023-01-11 | End: 2023-01-12 | Stop reason: HOSPADM

## 2023-01-11 RX ORDER — PROPOFOL 10 MG/ML
INJECTION, EMULSION INTRAVENOUS PRN
Status: DISCONTINUED | OUTPATIENT
Start: 2023-01-11 | End: 2023-01-11

## 2023-01-11 RX ORDER — ONDANSETRON 2 MG/ML
4 INJECTION INTRAMUSCULAR; INTRAVENOUS EVERY 30 MIN PRN
Status: DISCONTINUED | OUTPATIENT
Start: 2023-01-11 | End: 2023-01-12 | Stop reason: HOSPADM

## 2023-01-11 RX ORDER — OXYCODONE HYDROCHLORIDE 5 MG/1
5 TABLET ORAL EVERY 4 HOURS PRN
Status: DISCONTINUED | OUTPATIENT
Start: 2023-01-11 | End: 2023-01-12 | Stop reason: HOSPADM

## 2023-01-11 RX ADMIN — EPHEDRINE SULFATE 5 MG: 50 INJECTION, SOLUTION INTRAMUSCULAR; INTRAVENOUS; SUBCUTANEOUS at 09:40

## 2023-01-11 RX ADMIN — PROPOFOL 50 MG: 10 INJECTION, EMULSION INTRAVENOUS at 09:10

## 2023-01-11 RX ADMIN — Medication 100 MCG: at 09:43

## 2023-01-11 RX ADMIN — LIDOCAINE HYDROCHLORIDE 60 MG: 20 INJECTION, SOLUTION INFILTRATION; PERINEURAL at 09:10

## 2023-01-11 RX ADMIN — FENTANYL CITRATE 25 MCG: 50 INJECTION, SOLUTION INTRAMUSCULAR; INTRAVENOUS at 09:19

## 2023-01-11 RX ADMIN — SODIUM CHLORIDE, SODIUM LACTATE, POTASSIUM CHLORIDE, CALCIUM CHLORIDE: 600; 310; 30; 20 INJECTION, SOLUTION INTRAVENOUS at 07:39

## 2023-01-11 RX ADMIN — Medication 100 MCG: at 09:27

## 2023-01-11 RX ADMIN — PROPOFOL 125 MCG/KG/MIN: 10 INJECTION, EMULSION INTRAVENOUS at 09:08

## 2023-01-11 RX ADMIN — EPHEDRINE SULFATE 5 MG: 50 INJECTION, SOLUTION INTRAMUSCULAR; INTRAVENOUS; SUBCUTANEOUS at 09:43

## 2023-01-11 RX ADMIN — ACETAMINOPHEN 975 MG: 325 TABLET ORAL at 07:38

## 2023-01-11 RX ADMIN — CEFAZOLIN SODIUM 2 G: 2 SOLUTION INTRAVENOUS at 09:06

## 2023-01-11 RX ADMIN — ONDANSETRON 4 MG: 2 INJECTION INTRAMUSCULAR; INTRAVENOUS at 09:41

## 2023-01-11 RX ADMIN — Medication 100 MCG: at 09:37

## 2023-01-11 ASSESSMENT — LIFESTYLE VARIABLES: TOBACCO_USE: 1

## 2023-01-11 ASSESSMENT — ENCOUNTER SYMPTOMS: SEIZURES: 0

## 2023-01-11 NOTE — ANESTHESIA PREPROCEDURE EVALUATION
Anesthesia Pre-Procedure Evaluation    Patient: Hemalatha Handy   MRN: [id number] : 1940        Procedure : Procedure(s):  left pelvic exam  under anesthesia, wide local excision left vulva          Past Medical History:   Diagnosis Date     Actinic keratosis      Anxiety      Basal cell carcinoma pt unsure    abdomen, and elsewhere     Cervical cancer (H) early     stage 3 txd.  she did not have hysterectomy     Colorectal polyps 2008    1 Polyp     Gastroesophageal reflux disease without esophagitis      HLD (hyperlipidemia)      Hypertension      Vulvar cancer (H) 2022      Past Surgical History:   Procedure Laterality Date     COLONOSCOPY  2008     ENT SURGERY  2012    Left lower lip lesion      Allergies   Allergen Reactions     Amoxicillin Hives     Asa [Aspirin]      Evista [Raloxifene Hydrochloride]      Prempro      Raloxifene Hives and Rash      Social History     Tobacco Use     Smoking status: Former     Packs/day: 1.00     Years: 45.00     Pack years: 45.00     Types: Cigarettes     Quit date: 2005     Years since quittin.0     Smokeless tobacco: Never   Substance Use Topics     Alcohol use: Yes     Comment: socially      Wt Readings from Last 1 Encounters:   23 70.3 kg (155 lb)        Anesthesia Evaluation   Pt has had prior anesthetic. Type: MAC.    No history of anesthetic complications       ROS/MED HX  ENT/Pulmonary:     (+) tobacco use, Past use,  (-) asthma and sleep apnea   Neurologic: Comment: Mild hearing loss  Tinnitus     (-) no seizures and no CVA   Cardiovascular:     (+) Dyslipidemia hypertension-----    METS/Exercise Tolerance: 3 - Able to walk 1-2 blocks without stopping Comment: Activity limited due to hip pain     Hematologic:  - neg hematologic  ROS  (-) history of blood clots and history of blood transfusion   Musculoskeletal: Comment: Mild diffuse arthritis  (+) arthritis,     GI/Hepatic:  - neg GI/hepatic ROS  (-) GERD and liver  disease   Renal/Genitourinary: Comment: Vulvar cancer   (-) renal disease   Endo:  - neg endo ROS  (-) Type II DM   Psychiatric/Substance Use:     (+) psychiatric history anxiety     Infectious Disease:  - neg infectious disease ROS     Malignancy:   (+) Malignancy, History of Other.Other CA Vulvar Active status post.    Other:  - neg other ROS          Physical Exam    Airway  airway exam normal      Mallampati: I       Respiratory Devices and Support         Dental  no notable dental history         Cardiovascular   cardiovascular exam normal          Pulmonary   pulmonary exam normal                OUTSIDE LABS:  CBC:   Lab Results   Component Value Date    WBC 4.1 11/15/2022    WBC 4.7 03/24/2022    HGB 14.2 11/15/2022    HGB 13.0 03/24/2022    HCT 43.8 11/15/2022    HCT 39.8 03/24/2022     11/15/2022     03/24/2022     BMP:   Lab Results   Component Value Date     11/15/2022     03/24/2022    POTASSIUM 4.4 11/15/2022    POTASSIUM 3.9 03/24/2022    CHLORIDE 111 (H) 11/15/2022    CHLORIDE 107 03/24/2022    CO2 29 11/15/2022    CO2 28 03/24/2022    BUN 16 11/15/2022    BUN 18 03/24/2022    CR 0.65 11/15/2022    CR 0.67 03/24/2022     (H) 11/15/2022     (H) 03/24/2022     COAGS: No results found for: PTT, INR, FIBR  POC: No results found for: BGM, HCG, HCGS  HEPATIC:   Lab Results   Component Value Date    ALBUMIN 3.8 11/15/2022    PROTTOTAL 7.1 11/15/2022    ALT 21 11/15/2022    AST 18 11/15/2022    ALKPHOS 88 11/15/2022    BILITOTAL 0.5 11/15/2022     OTHER:   Lab Results   Component Value Date    A1C 5.7 (H) 11/15/2022    VASHTI 8.6 11/15/2022    LIPASE 122 12/09/2021    AMYLASE 52 12/09/2021    TSH 1.69 11/15/2022    SED 8 07/23/2009       Anesthesia Plan    ASA Status:  2   NPO Status:  NPO Appropriate    Anesthesia Type: MAC.     - Reason for MAC: immobility needed   Induction: Intravenous.   Maintenance: TIVA.        Consents    Anesthesia Plan(s) and associated risks,  benefits, and realistic alternatives discussed. Questions answered and patient/representative(s) expressed understanding.    - Discussed:     - Discussed with:  Patient      - Extended Intubation/Ventilatory Support Discussed: No.      - Patient is DNR/DNI Status: No    Use of blood products discussed: No .     Postoperative Care    Pain management: Multi-modal analgesia, IV analgesics.   PONV prophylaxis: Ondansetron (or other 5HT-3), Dexamethasone or Solumedrol     Comments:                Franki Stewart MD

## 2023-01-11 NOTE — TELEPHONE ENCOUNTER
Oncology Nurse Triage    Situation:   Hemalatha reporting the following symptoms:  -post procedure bleeding    Background:   Treating Provider:   Dr. Quevedo    Date of last office visit: 1/11/23    Recent Treatments:      Assessment:     Onset: continued bleeding from procedure,had some drops of blood on the floor when urinated. Current pad on now has minimal bleeding so maybe slowing down.    Pt wasn't sure what to expect post procedure and realized forgot to talk about it with provider.     Pt will continue to monitor self closely for more bleeding as during time of triage call, there is minimal blood on period pad now.     Recommendations:   Instructed patient to seek care immediately for worsening symptoms, including: fever, chest pain, shortness of breath, dizziness, soak/saturates a full regular size period pad in 1 hour    Pt in agreement with plan and knows to call if any additional concerns.     Route update to care team.

## 2023-01-11 NOTE — DISCHARGE INSTRUCTIONS
[facility]  Home Care Following Anesthesia  For 24 hours after surgery:  Get plenty of rest.  A responsible adult must stay with you for at least 24 hours after you leave the surgery center.  Do not drive or use heavy equipment.  If you have weakness or tingling, don't drive or use heavy equipment until this feeling goes away.   Do not drink alcohol.   Avoid strenuous or risky activities.  Ask for help when climbing stairs.  You may feel lightheaded.  IF so, sit for a few minutes before standing.  Have someone help you get up.   If you have nausea (feel sick to your stomach): Drink only clear liquids such as apple juice, ginger ale, broth or 7-Up.  Rest may also help.  Be sure to drink enough fluids.  Move to a regular diet as you feel able.   You may have a slight fever.  Call the doctor if your fever is over 100 F (37.7 C) (taken under the tongue) or lasts longer than 24 hours.  You may have a dry mouth, a sore throat, muscle aches or trouble sleeping. These should go away after 24 hours.  Do not make important or legal decisions.   It is recommended to avoid smoking.               Tips for taking pain medications  To get the best pain relief possible, remember these points:  Take pain medications as directed, before pain becomes severe.  Pain medication can upset your stomach: taking it with food may help.  Constipation is a common side effect of pain medication. Drink plenty of  fluids.  Eat foods high in fiber. Take a stool softener if recommended by your doctor or pharmacist.  Do not drink alcohol, drive or operate machinery while taking pain medications.  Ask about other ways to control pain, such as with heat, ice or relaxation.    Tylenol/Acetaminophen Consumption  To help encourage the safe use of acetaminophen, the makers of TYLENOL  have lowered the maximum daily dose for single-ingredient Extra Strength TYLENOL  (acetaminophen) products sold in the [place] from 8 pills  per day (4,000 mg) to 6 pills per day (3,000 mg). The dosing interval has also changed from 2 pills every 4-6 hours to 2 pills every 6 hours.  If you feel your pain relief is insufficient, you may take Tylenol/Acetaminophen in addition to your narcotic pain medication.   Be careful not to exceed 3,000 mg of Tylenol/Acetaminophen in a 24 hour period from all sources.  If you are taking extra strength Tylenol/acetaminophen (500 mg), the maximum dose is 6 tablets in 24 hours.  If you are taking regular strength acetaminophen (325 mg), the maximum dose is 9 tablets in 24 hours.  Tylenol administered at 730am today. Next dose due 1:30pm.    Call a doctor for any of the following:  Signs of infection (fever, growing tenderness at the surgery site, a large amount of drainage or bleeding, severe pain, foul-smelling drainage, redness, swelling).  It has been over 8 to 10 hours since surgery and you are still not able to urinate (pass water).  Headache for over 24 hours.  Numbness, tingling or weakness the day after surgery (if you had spinal anesthesia).  Signs of Covid-19 infection (temperature over 100 degrees, shortness of breath, cough, loss of taste/smell, generalized body aches, persistent headache, chills, sore throat, nausea/vomiting/diarrhea)  Your doctor is:       Dinesh Melton, Gynecologic Oncology: [phone]               Or dial [phone] and ask for the resident on call for:  Gynecologic Oncology  For emergency care, call the:  [facility]:  [phone] (TTY for hearing impaired: [phone])

## 2023-01-11 NOTE — ANESTHESIA POSTPROCEDURE EVALUATION
Patient: Hemalatha Handy    Procedure: Procedure(s):  left pelvic exam  under anesthesia, wide local excision left vulva       Anesthesia Type:  MAC    Note:  Disposition: Outpatient   Postop Pain Control: Uneventful            Sign Out: Well controlled pain   PONV: No   Neuro/Psych: Uneventful            Sign Out: Acceptable/Baseline neuro status   Airway/Respiratory: Uneventful            Sign Out: Acceptable/Baseline resp. status   CV/Hemodynamics: Uneventful            Sign Out: Acceptable CV status; No obvious hypovolemia; No obvious fluid overload   Other NRE: NONE   DID A NON-ROUTINE EVENT OCCUR? No           Last vitals:  Vitals Value Taken Time   /50 01/11/23 1015   Temp 36.2  C (97.1  F) 01/11/23 1015   Pulse 81 01/11/23 1015   Resp 16 01/11/23 1015   SpO2 96 % 01/11/23 1015       Electronically Signed By: Franki Stewart MD  January 11, 2023  12:51 PM

## 2023-01-11 NOTE — ANESTHESIA CARE TRANSFER NOTE
Patient: Hemalatha Handy    Procedure: Procedure(s):  left pelvic exam  under anesthesia, wide local excision left vulva       Diagnosis: Vulvar cancer (H) [C51.9]  Diagnosis Additional Information: No value filed.    Anesthesia Type:   MAC     Note:    Oropharynx: oropharynx clear of all foreign objects and spontaneously breathing  Level of Consciousness: awake  Oxygen Supplementation: room air    Independent Airway: airway patency satisfactory and stable  Dentition: dentition unchanged  Vital Signs Stable: post-procedure vital signs reviewed and stable  Report to RN Given: handoff report given  Patient transferred to: Phase II    Handoff Report: Identifed the Patient, Identified the Reponsible Provider, Reviewed the pertinent medical history, Discussed the surgical course, Reviewed Intra-OP anesthesia mangement and issues during anesthesia, Set expectations for post-procedure period and Allowed opportunity for questions and acknowledgement of understanding      Vitals:  Vitals Value Taken Time   /49 01/11/23 1002   Temp 36.2  C (97.2  F) 01/11/23 1002   Pulse 79 01/11/23 1002   Resp 16 01/11/23 1002   SpO2 93 % 01/11/23 1002       Electronically Signed By: SATHYA Myers CRNA  January 11, 2023  10:15 AM

## 2023-01-11 NOTE — OP NOTE
[facility]  Operative Note    Name: Hemalatha Handy  MRN: [id number]  : 1940  Date of Surgery: 2023    Pre-operative Diagnosis: 82 year old; basal cell carcinoma of the vulva   Post-operative Diagnosis: Same, s/p exam under anesthesia and wide local excision of left vulvar lesion  Procedure(s):   1. Exam under anesthesia   2. Wide local excision of the left vulva    Surgeon: Jenni Melton MD   Assistants: Inderjit Fierro MD PGY1    Anesthesia: MAC  EBL: 5 mL   Urine Output: Bladder not drained  Fluids: 500 mL crystalloid  Perioperative antibiotics: Ancef 2g  Specimens: left vulvar lesion  Complications: None apparent  Indications: Hemalatha Handy is a 82 year old old female with a history of cervical cancer s/p radiation therapy in 1960s who presented to clinic with a 2x3cm left vulvar mass. Biopsy performed with findings consistent with basal cell carcinoma. Exam under anesthesia with wide local excision of the left vulva was recommended. The risks and benefits of and alternatives to the procedure were discussed with the patient, all questions were answered and written informed consent was obtained.       Findings: Exam under anesthesia revealed 2x3cm mass just lateral to labia majorum. Mass is raised, firm, rubbery, nontender, and not fixed to underlying subcutaneous tissue. Middle of mass bleeding with manipulation.    Procedure Details: The patient was taken to the OR where she was placed in the dorsal lithotomy position with feet in Jared stirrups. General anesthesia was administered. The patient was prepped and draped in the usual sterile fashion.    An exam under anesthesia was performed which revealed above findings.  10 cc of 0.25% bupivacaine was then injected into the tissue overlying and immediately surrounding the abnormal tissue.  A wide area around the lesion was marked and a superficial incision made using a 11 blade scalpel.  The incision was carried  through the subcutaneous tissue with electrocautery.  The specimen was removed, marked at 12 o'clock with a stitch, and sent for pathology. Bleeding in the subcutaneous tissue was controlled with electrocautery. The subcutaneous tissue was then closely re-approximated using 0 vicryl in simple interrupted fashion.  The skin was closed with a series of simple interrupted sutures using 2-0 vicryl.  Hemostasis was noted at the end of the procedure.    Dr. Melton was present for the procedure. The patient tolerated the procedure well, was extubated in the OR, and transferred to the PACU in stable condition.     Inderjit Fierro MD  Ob/Gyn Resident, PGY-1  01/11/23 8:45 AM  Gyn Onc Pager [phone]    Attending Attestation:  I was present and scrubbed for the entire surgical procedure.  I have reviewed and edited above note and agree with findings as documented.      Jenni Melton MD  Gynecologic Oncology  [facility]

## 2023-01-11 NOTE — INTERVAL H&P NOTE
"I have reviewed the surgical (or preoperative) H&P that is linked to this encounter, and examined the patient. There are no significant changes    Clinical Conditions Present on Arrival:  Clinically Significant Risk Factors Present on Admission                    # Overweight: Estimated body mass index is 29.29 kg/m  as calculated from the following:    Height as of this encounter: 1.549 m (5' 1\").    Weight as of this encounter: 70.3 kg (155 lb).       "

## 2023-01-13 ENCOUNTER — PATIENT OUTREACH (OUTPATIENT)
Dept: ONCOLOGY | Facility: CLINIC | Age: 83
End: 2023-01-13

## 2023-01-13 NOTE — PROGRESS NOTES
Post op call and follow up to triage call attempted    Patient unavailable. Voicemail with call back number left    Alena Tolliver RN

## 2023-01-17 LAB
PATH REPORT.COMMENTS IMP SPEC: ABNORMAL
PATH REPORT.COMMENTS IMP SPEC: ABNORMAL
PATH REPORT.COMMENTS IMP SPEC: YES
PATH REPORT.FINAL DX SPEC: ABNORMAL
PATH REPORT.GROSS SPEC: ABNORMAL
PATH REPORT.MICROSCOPIC SPEC OTHER STN: ABNORMAL
PATH REPORT.RELEVANT HX SPEC: ABNORMAL
PHOTO IMAGE: ABNORMAL

## 2023-01-17 PROCEDURE — 88309 TISSUE EXAM BY PATHOLOGIST: CPT | Mod: 26 | Performed by: PATHOLOGY

## 2023-01-24 ENCOUNTER — ONCOLOGY VISIT (OUTPATIENT)
Dept: ONCOLOGY | Facility: CLINIC | Age: 83
End: 2023-01-24
Attending: OBSTETRICS & GYNECOLOGY
Payer: COMMERCIAL

## 2023-01-24 VITALS
HEART RATE: 81 BPM | OXYGEN SATURATION: 95 % | DIASTOLIC BLOOD PRESSURE: 72 MMHG | BODY MASS INDEX: 29.59 KG/M2 | RESPIRATION RATE: 16 BRPM | SYSTOLIC BLOOD PRESSURE: 150 MMHG | WEIGHT: 156.6 LBS | TEMPERATURE: 98.3 F

## 2023-01-24 DIAGNOSIS — C51.9 VULVAR CANCER (H): Primary | ICD-10-CM

## 2023-01-24 PROCEDURE — G0463 HOSPITAL OUTPT CLINIC VISIT: HCPCS

## 2023-01-24 PROCEDURE — 99214 OFFICE O/P EST MOD 30 MIN: CPT | Mod: 24 | Performed by: OBSTETRICS & GYNECOLOGY

## 2023-01-24 PROCEDURE — G0463 HOSPITAL OUTPT CLINIC VISIT: HCPCS | Performed by: OBSTETRICS & GYNECOLOGY

## 2023-01-24 ASSESSMENT — PAIN SCALES - GENERAL: PAINLEVEL: MODERATE PAIN (5)

## 2023-01-24 NOTE — PATIENT INSTRUCTIONS
Continue local wound care    Keep area clean and dry     Return in four weeks    Jenni Melton MD  Gynecologic Oncology  [facility],

## 2023-01-24 NOTE — NURSING NOTE
"Oncology Rooming Note    January 24, 2023 12:01 PM   Hemalatha Handy is a 82 year old female who presents for:    Chief Complaint   Patient presents with     Oncology Clinic Visit     RTN for Vulvar Cancer     Initial Vitals: Blood Pressure (Abnormal) 150/72   Pulse 81   Temperature 98.3  F (36.8  C) (Oral)   Respiration 16   Weight 71 kg (156 lb 9.6 oz)   Last Menstrual Period  (LMP Unknown)   Oxygen Saturation 95%   Body Mass Index 29.59 kg/m   Estimated body mass index is 29.59 kg/m  as calculated from the following:    Height as of 1/11/23: 1.549 m (5' 1\").    Weight as of this encounter: 71 kg (156 lb 9.6 oz). Body surface area is 1.75 meters squared.  Moderate Pain (5) Comment: Data Unavailable   No LMP recorded (lmp unknown). Patient is postmenopausal.  Allergies reviewed: Yes  Medications reviewed: Yes    Medications: Medication refills not needed today.  Pharmacy name entered into [facility]:    [facility][id number] [address] AT [facility] & [address] [facility] - [facility] [address]    Clinical concerns: She is still having some pain.       Va JOANNA Sigala            "

## 2023-01-24 NOTE — LETTER
"  1/24/2023     RE: Hemalatha Handy  [address]  [place] [address]    Dear Colleague,    Thank you for referring your patient, Hemalatha Handy, to the [facility]. Please see a copy of my visit note below.                            Consult Notes on Referred Patient    Date: 1/24/2023     Patient returns today for follow-up related to her history of  Vulvar basal cell carcinoma      Treatment History:  Presented to primary ob/gyn 11/9/2022 due to new vulvar lumps  \"She has had a couple of vulvar lumps.  These are on the left side. They started about 3 months ago.  She might have had them longer.  It started getting irritated and increased a little in size.  She used hot packs and they decreased in size a little. She used lotrimin cream and it did not help. She states she had a VD years ago, that her  gave her. She had cervical cancer in the early 1960s and she had radiation therapy. \" Vulvar biopsy was performed.  Pathology:  A.  Vulva, left labia majora, biopsy:  -Basal cell carcinoma, nodular type, present at resection margin.     Hx of cervical cancer, she notes that she was treated with radiation in the 1960s at the [facility]. She did not follow with an oncologist after this, did have pap smears regularly until about 10-15 years ago, doesn't remember any abnormal pap smears since her radiation. Has not had a pelvic exam in some time     11/22/22:  Seen in Gyn Onc.  Recommend WLE    1/11/23: EUA, WLE left vulvar lesion.  Path showed basal cell carcinoma, nodular and superficial types, margins negative >2mm    The patient is seen today for follow-up.  Overall doing well.   Some vulvar discomfort, mild drainage, pain controlled, no odor.    Past Medical History:    Past Medical History:   Diagnosis Date     Actinic keratosis      Anxiety      Basal cell carcinoma pt unsure    abdomen, and elsewhere     Cervical cancer (H) early 1960's    stage 3 " txd.  she did not have hysterectomy     Colorectal polyps 2008    1 Polyp     Gastroesophageal reflux disease without esophagitis      HLD (hyperlipidemia)      Hypertension      Vulvar cancer (H) 2022         Past Surgical History:    Past Surgical History:   Procedure Laterality Date     COLONOSCOPY  2008     ENT SURGERY  2012    Left lower lip lesion     EXCISE VULVA WIDE LOCAL Left 2023    Procedure: left pelvic exam  under anesthesia, wide local excision left vulva;  Surgeon: Jenni Melton MD;  Location: [facility] OR         Health Maintenance:  Health Maintenance Due   Topic Date Due     ZOSTER IMMUNIZATION (2 of 3) 2016     DEXA  2017     COVID-19 Vaccine (4 - Booster for Moderna series) 2022     DTAP/TDAP/TD IMMUNIZATION (3 - Td or Tdap) 2022     PHQ-2 (once per calendar year)  2023     MEDICARE ANNUAL WELLNESS VISIT  2023     FALL RISK ASSESSMENT  2023         Current Medications:     has a current medication list which includes the following prescription(s): alprazolam, chlorhexidine, citalopram, clotrimazole, diphenhydramine, lisinopril, meclizine, motrin, multivitamin w/minerals, and oxycodone.       Allergies:     Amoxicillin, Asa [aspirin], Evista [raloxifene hydrochloride], Prempro, and Raloxifene        Social History:     Social History     Tobacco Use     Smoking status: Former     Packs/day: 1.00     Years: 45.00     Pack years: 45.00     Types: Cigarettes     Quit date: 2005     Years since quittin.1     Smokeless tobacco: Never   Substance Use Topics     Alcohol use: Yes     Comment: socially       History   Drug Use No           Family History:     The patient's family history is notable for :    Family History   Problem Relation Age of Onset     Hypertension Mother      Hypertension Brother      Cancer Son         hodgkins     C.A.D. No family hx of      Cerebrovascular Disease No family hx of      Anesthesia Reaction No  family hx of      Deep Vein Thrombosis (DVT) No family hx of          Physical Exam:     BP (!) 150/72   Pulse 81   Temp 98.3  F (36.8  C) (Oral)   Resp 16   Wt 71 kg (156 lb 9.6 oz)   LMP  (LMP Unknown)   SpO2 95%   BMI 29.59 kg/m    Body mass index is 29.59 kg/m .    General Appearance: healthy and alert, no distress     Musculoskeletal: extremities non tender and without edema    Skin: no lesions or rashes     Neurological: normal gait, no gross defects     Psychiatric: appropriate mood and affect                               Hematological: normal cervical, supraclavicular and inguinal lymph nodes     Gastrointestinal:       abdomen soft, non-tender, non-distended, no organomegaly or masses    Genitourinary: Status post WLE left vulva.  Incision intact, small amount of fibrinous debris, minimal edema, no signs of infection    Assessment:    Hemalatha Handy is a 82 year old woman with a new diagnosis of basal cell carcinoma vulva status post WLE.       30 minutes spent on the date of the encounter doing chart review, history and exam, documentation and further activities as noted above      Plan:     1.)    Stage IB basal cell carcinoma:  Pathology reveiwed in detail.  Margins negative.  No need for any additional treatment.    Healing well from surgery. Continue close follow-up with regular surveillance exams of vulva. I would like to see her in four weeks to ensure she continues to heal appropriately.    Questions answered, she expressed understanding of plan of care.    Jenni Melton MD  Gynecologic Oncology  [facility]    CC  Patient Care Team:  Javier Jones MD as PCP - General  Javier Jones MD as Assigned PCP  Otoniel Perez MD as Assigned Musculoskeletal Provider  Rachel Mcdonald PA-C as Physician Assistant (Dermatology)  Jenni Melton MD as MD (Gynecologic Oncology)  Steve Martin MD as Assigned OBGYN Provider  Jenni Melton MD as  Assigned Cancer Care Provider  RICKI KAM

## 2023-02-24 NOTE — PROGRESS NOTES
"                        Consult Notes on Referred Patient    Date: 1/24/2023     Patient returns today for follow-up related to her history of  Vulvar basal cell carcinoma      Treatment History:  Presented to primary ob/gyn 11/9/2022 due to new vulvar lumps  \"She has had a couple of vulvar lumps.  These are on the left side. They started about 3 months ago.  She might have had them longer.  It started getting irritated and increased a little in size.  She used hot packs and they decreased in size a little. She used lotrimin cream and it did not help. She states she had a VD years ago, that her  gave her. She had cervical cancer in the early 1960s and she had radiation therapy. \" Vulvar biopsy was performed.  Pathology:  A.  Vulva, left labia majora, biopsy:  -Basal cell carcinoma, nodular type, present at resection margin.     Hx of cervical cancer, she notes that she was treated with radiation in the 1960s at the [facility]. She did not follow with an oncologist after this, did have pap smears regularly until about 10-15 years ago, doesn't remember any abnormal pap smears since her radiation. Has not had a pelvic exam in some time     11/22/22:  Seen in Gyn Onc.  Recommend WLE    1/11/23: EUA, WLE left vulvar lesion.  Path showed basal cell carcinoma, nodular and superficial types, margins negative >2mm    The patient is seen today for follow-up.  Overall doing well.   Some vulvar discomfort, mild drainage, pain controlled, no odor.    Past Medical History:    Past Medical History:   Diagnosis Date     Actinic keratosis      Anxiety      Basal cell carcinoma pt unsure    abdomen, and elsewhere     Cervical cancer (H) early 1960's    stage 3 txd.  she did not have hysterectomy     Colorectal polyps 01/01/2008    1 Polyp     Gastroesophageal reflux disease without esophagitis      HLD (hyperlipidemia)      Hypertension      Vulvar cancer (H) 11/2022         Past Surgical History:    Past Surgical " History:   Procedure Laterality Date     COLONOSCOPY  2008     ENT SURGERY  2012    Left lower lip lesion     EXCISE VULVA WIDE LOCAL Left 2023    Procedure: left pelvic exam  under anesthesia, wide local excision left vulva;  Surgeon: Jenni Melton MD;  Location: [facility] OR         Health Maintenance:  Health Maintenance Due   Topic Date Due     ZOSTER IMMUNIZATION (2 of 3) 2016     DEXA  2017     COVID-19 Vaccine (4 - Booster for Moderna series) 2022     DTAP/TDAP/TD IMMUNIZATION (3 - Td or Tdap) 2022     PHQ-2 (once per calendar year)  2023     MEDICARE ANNUAL WELLNESS VISIT  2023     FALL RISK ASSESSMENT  2023         Current Medications:     has a current medication list which includes the following prescription(s): alprazolam, chlorhexidine, citalopram, clotrimazole, diphenhydramine, lisinopril, meclizine, motrin, multivitamin w/minerals, and oxycodone.       Allergies:     Amoxicillin, Asa [aspirin], Evista [raloxifene hydrochloride], Prempro, and Raloxifene        Social History:     Social History     Tobacco Use     Smoking status: Former     Packs/day: 1.00     Years: 45.00     Pack years: 45.00     Types: Cigarettes     Quit date: 2005     Years since quittin.1     Smokeless tobacco: Never   Substance Use Topics     Alcohol use: Yes     Comment: socially       History   Drug Use No           Family History:     The patient's family history is notable for :    Family History   Problem Relation Age of Onset     Hypertension Mother      Hypertension Brother      Cancer Son         hodgkins     C.A.D. No family hx of      Cerebrovascular Disease No family hx of      Anesthesia Reaction No family hx of      Deep Vein Thrombosis (DVT) No family hx of          Physical Exam:     BP (!) 150/72   Pulse 81   Temp 98.3  F (36.8  C) (Oral)   Resp 16   Wt 71 kg (156 lb 9.6 oz)   LMP  (LMP Unknown)   SpO2 95%   BMI 29.59 kg/m    Body mass index  is 29.59 kg/m .    General Appearance: healthy and alert, no distress     Musculoskeletal: extremities non tender and without edema    Skin: no lesions or rashes     Neurological: normal gait, no gross defects     Psychiatric: appropriate mood and affect                               Hematological: normal cervical, supraclavicular and inguinal lymph nodes     Gastrointestinal:       abdomen soft, non-tender, non-distended, no organomegaly or masses    Genitourinary: Status post WLE left vulva.  Incision intact, small amount of fibrinous debris, minimal edema, no signs of infection    Assessment:    Hemalatha Handy is a 82 year old woman with a new diagnosis of basal cell carcinoma vulva status post WLE.       30 minutes spent on the date of the encounter doing chart review, history and exam, documentation and further activities as noted above      Plan:     1.)    Stage IB basal cell carcinoma:  Pathology reveiwed in detail.  Margins negative.  No need for any additional treatment.    Healing well from surgery. Continue close follow-up with regular surveillance exams of vulva. I would like to see her in four weeks to ensure she continues to heal appropriately.    Questions answered, she expressed understanding of plan of care.    Jenni Melton MD  Gynecologic Oncology  [facility]      CC  Patient Care Team:  Javier Jones MD as PCP - General  Javier Jones MD as Assigned PCP  Otoniel Perez MD as Assigned Musculoskeletal Provider  Rachel Mcdonald PA-C as Physician Assistant (Dermatology)  Jenni Melton MD as MD (Gynecologic Oncology)  Steve Martin MD as Assigned OBGYN Provider  Jenni Melton MD as Assigned Cancer Care Provider  JENNI MELTON

## 2023-02-28 ENCOUNTER — ONCOLOGY VISIT (OUTPATIENT)
Dept: ONCOLOGY | Facility: CLINIC | Age: 83
End: 2023-02-28
Attending: OBSTETRICS & GYNECOLOGY
Payer: COMMERCIAL

## 2023-02-28 VITALS
RESPIRATION RATE: 16 BRPM | WEIGHT: 155 LBS | BODY MASS INDEX: 29.29 KG/M2 | HEART RATE: 73 BPM | OXYGEN SATURATION: 99 % | DIASTOLIC BLOOD PRESSURE: 74 MMHG | SYSTOLIC BLOOD PRESSURE: 127 MMHG | TEMPERATURE: 97.5 F

## 2023-02-28 DIAGNOSIS — C51.9 VULVAR CANCER (H): Primary | ICD-10-CM

## 2023-02-28 PROCEDURE — 99214 OFFICE O/P EST MOD 30 MIN: CPT | Performed by: OBSTETRICS & GYNECOLOGY

## 2023-02-28 PROCEDURE — G0463 HOSPITAL OUTPT CLINIC VISIT: HCPCS | Performed by: OBSTETRICS & GYNECOLOGY

## 2023-02-28 ASSESSMENT — PAIN SCALES - GENERAL: PAINLEVEL: NO PAIN (0)

## 2023-02-28 NOTE — PROGRESS NOTES
"                        Consult Notes on Referred Patient    Date: 2/28/2023     Patient returns today for follow-up related to her history of  Vulvar basal cell carcinoma      Treatment History:  Presented to primary ob/gyn 11/9/2022 due to new vulvar lumps  \"She has had a couple of vulvar lumps.  These are on the left side. They started about 3 months ago.  She might have had them longer.  It started getting irritated and increased a little in size.  She used hot packs and they decreased in size a little. She used lotrimin cream and it did not help. She states she had a VD years ago, that her  gave her. She had cervical cancer in the early 1960s and she had radiation therapy. \" Vulvar biopsy was performed.  Pathology:  A.  Vulva, left labia majora, biopsy:  -Basal cell carcinoma, nodular type, present at resection margin.     Hx of cervical cancer, she notes that she was treated with radiation in the 1960s at the [facility]. She did not follow with an oncologist after this, did have pap smears regularly until about 10-15 years ago, doesn't remember any abnormal pap smears since her radiation. Has not had a pelvic exam in some time      11/22/22:  Seen in Gyn Onc.  Recommend WLE     1/11/23: EUA, WLE left vulvar lesion.  Path showed basal cell carcinoma, nodular and superficial types, margins negative >2mm       Patient presents today for evaluation.  No further drainage, healed well.  Some firmness along incision line, no pain.           Past Medical History:    Past Medical History:   Diagnosis Date     Actinic keratosis      Anxiety      Basal cell carcinoma pt unsure    abdomen, and elsewhere     Cervical cancer (H) early 1960's    stage 3 txd.  she did not have hysterectomy     Colorectal polyps 01/01/2008    1 Polyp     Gastroesophageal reflux disease without esophagitis      HLD (hyperlipidemia)      Hypertension      Vulvar cancer (H) 11/2022         Past Surgical History:    Past Surgical " History:   Procedure Laterality Date     COLONOSCOPY  2008     ENT SURGERY  2012    Left lower lip lesion     EXCISE VULVA WIDE LOCAL Left 2023    Procedure: left pelvic exam  under anesthesia, wide local excision left vulva;  Surgeon: Jenni Melton MD;  [facility]         Health Maintenance:  Health Maintenance Due   Topic Date Due     ZOSTER IMMUNIZATION (2 of 3) 2016     DEXA  2017     COVID-19 Vaccine (4 - Booster for Moderna series) 2022     DTAP/TDAP/TD IMMUNIZATION (3 - Td or Tdap) 2022     PHQ-2 (once per calendar year)  2023     MEDICARE ANNUAL WELLNESS VISIT  2023     FALL RISK ASSESSMENT  2023         Current Medications:     has a current medication list which includes the following prescription(s): alprazolam, chlorhexidine, citalopram, clotrimazole, diphenhydramine, lisinopril, meclizine, motrin, multivitamin w/minerals, and oxycodone.       Allergies:     Amoxicillin, Asa [aspirin], Evista [raloxifene hydrochloride], Prempro, and Raloxifene        Social History:     Social History     Tobacco Use     Smoking status: Former     Packs/day: 1.00     Years: 45.00     Pack years: 45.00     Types: Cigarettes     Quit date: 2005     Years since quittin.1     Smokeless tobacco: Never   Substance Use Topics     Alcohol use: Yes     Comment: socially       History   Drug Use No           Family History:     The patient's family history is notable for:    Family History   Problem Relation Age of Onset     Hypertension Mother      Hypertension Brother      Cancer Son         hodgkins     C.A.D. No family hx of      Cerebrovascular Disease No family hx of      Anesthesia Reaction No family hx of      Deep Vein Thrombosis (DVT) No family hx of          Physical Exam:     /74   Pulse 73   Temp 97.5  F (36.4  C) (Oral)   Resp 16   Wt 70.3 kg (155 lb)   LMP  (LMP Unknown)   SpO2 99%   BMI 29.29 kg/m    Body mass index is 29.29  kg/m .    General Appearance: healthy and alert, no distress     Musculoskeletal: extremities non tender and without edema    Skin: no lesions or rashes     Neurological: normal gait, no gross defects     Psychiatric: appropriate mood and affect                               Hematological: normal cervical, supraclavicular and inguinal lymph nodes     Gastrointestinal:       abdomen soft, non-tender, non-distended, no organomegaly or masses    Genitourinary: Status post WLE left vulva.  Incision intact, well healed, expected postoperative induration along incision line, no evidence for infection or seroma.     Assessment:     Hemalatha Handy is a 82 year old woman with a new diagnosis of basal cell carcinoma vulva status post WLE.         30 minutes spent on the date of the encounter doing chart review, history and exam, documentation and further activities as noted above       Plan:      1.)        Stage IB basal cell carcinoma:  Pathology reveiwed in detail.  Margins negative.  No need for any additional treatment.     Healing well from surgery. Continue close follow-up with regular surveillance exams of vulva.  Should be seen every three months x 2 years, every six months for years 3-5 and then annually.    Can see NP.  Signs and symptoms of recurrence reviewed, precautions given.     Questions answered, she expressed understanding of plan of care.     Jenni Melton MD  Gynecologic Oncology  [facility]    CC  Patient Care Team:  Javier Jones MD as PCP - General  Javier Jones MD as Assigned PCP  Otoniel Perez MD as Assigned Musculoskeletal Provider  Rachel Mcdonald PA-C as Physician Assistant (Dermatology)  Jenni Melton MD as MD (Gynecologic Oncology)  Steve Martin MD as Assigned OBGYN Provider  Jenni Melton MD as Assigned Cancer Care Provider  SELF, REFERRED

## 2023-02-28 NOTE — NURSING NOTE
"Oncology Rooming Note    February 28, 2023 6:54 AM   Hemalatha Handy is a 82 year old female who presents for:    Chief Complaint   Patient presents with     Oncology Clinic Visit     Vulvar cancer     Initial Vitals: /74   Pulse 73   Temp 97.5  F (36.4  C) (Oral)   Resp 16   Wt 70.3 kg (155 lb)   LMP  (LMP Unknown)   SpO2 99%   BMI 29.29 kg/m   Estimated body mass index is 29.29 kg/m  as calculated from the following:    Height as of 1/11/23: 1.549 m (5' 1\").    Weight as of this encounter: 70.3 kg (155 lb). Body surface area is 1.74 meters squared.  No Pain (0) Comment: Data Unavailable   No LMP recorded (lmp unknown). Patient is postmenopausal.  Allergies reviewed: Yes  Medications reviewed: Yes    Medications: Medication refills not needed today.  Pharmacy name entered into [facility]:    [facility] #[id number] - [address] AT [facility] & [address] [facility]IL DELIVERY - [facility] [address]    Clinical concerns: none       Zainab Herbert CMA            "

## 2023-02-28 NOTE — PATIENT INSTRUCTIONS
Return in three months to see NP    Should be seen every three months x 2 years, every six months for years 3-5 and then annually.      Jenni Melton MD  Gynecologic Oncology  [facility]

## 2023-02-28 NOTE — LETTER
"  2/28/2023     RE: Hemalatha Handy  [address]  [place] [address]    Dear Colleague,    Thank you for referring your patient, Hemalatha Handy, to the [facility]. Please see a copy of my visit note below.                            Consult Notes on Referred Patient    Date: 2/28/2023     Patient returns today for follow-up related to her history of  Vulvar basal cell carcinoma      Treatment History:  Presented to primary ob/gyn 11/9/2022 due to new vulvar lumps  \"She has had a couple of vulvar lumps.  These are on the left side. They started about 3 months ago.  She might have had them longer.  It started getting irritated and increased a little in size.  She used hot packs and they decreased in size a little. She used lotrimin cream and it did not help. She states she had a VD years ago, that her  gave her. She had cervical cancer in the early 1960s and she had radiation therapy. \" Vulvar biopsy was performed.  Pathology:  A.  Vulva, left labia majora, biopsy:  -Basal cell carcinoma, nodular type, present at resection margin.     Hx of cervical cancer, she notes that she was treated with radiation in the 1960s at the [facility]. She did not follow with an oncologist after this, did have pap smears regularly until about 10-15 years ago, doesn't remember any abnormal pap smears since her radiation. Has not had a pelvic exam in some time      11/22/22:  Seen in Gyn Onc.  Recommend WLE     1/11/23: EUA, WLE left vulvar lesion.  Path showed basal cell carcinoma, nodular and superficial types, margins negative >2mm     Patient presents today for evaluation.  No further drainage, healed well.  Some firmness along incision line, no pain.           Past Medical History:  Past Medical History:   Diagnosis Date     Actinic keratosis      Anxiety      Basal cell carcinoma pt unsure    abdomen, and elsewhere     Cervical cancer (H) early 1960's    stage 3 " txd.  she did not have hysterectomy     Colorectal polyps 2008    1 Polyp     Gastroesophageal reflux disease without esophagitis      HLD (hyperlipidemia)      Hypertension      Vulvar cancer (H) 2022       Past Surgical History:  Past Surgical History:   Procedure Laterality Date     COLONOSCOPY  2008     ENT SURGERY  2012    Left lower lip lesion     EXCISE VULVA WIDE LOCAL Left 2023    Procedure: left pelvic exam  under anesthesia, wide local excision left vulva;  Surgeon: Jenni Melton MD;  Location: [facility] OR       Health Maintenance:  Health Maintenance Due   Topic Date Due     ZOSTER IMMUNIZATION (2 of 3) 2016     DEXA  2017     COVID-19 Vaccine (4 - Booster for Moderna series) 2022     DTAP/TDAP/TD IMMUNIZATION (3 - Td or Tdap) 2022     PHQ-2 (once per calendar year)  2023     MEDICARE ANNUAL WELLNESS VISIT  2023     FALL RISK ASSESSMENT  2023     Current Medications:     has a current medication list which includes the following prescription(s): alprazolam, chlorhexidine, citalopram, clotrimazole, diphenhydramine, lisinopril, meclizine, motrin, multivitamin w/minerals, and oxycodone.       Allergies:   Amoxicillin, Asa [aspirin], Evista [raloxifene hydrochloride], Prempro, and Raloxifene        Social History:   Social History     Tobacco Use     Smoking status: Former     Packs/day: 1.00     Years: 45.00     Pack years: 45.00     Types: Cigarettes     Quit date: 2005     Years since quittin.1     Smokeless tobacco: Never   Substance Use Topics     Alcohol use: Yes     Comment: socially       History   Drug Use No       Family History:     The patient's family history is notable for:    Family History   Problem Relation Age of Onset     Hypertension Mother      Hypertension Brother      Cancer Son         hodgkins     C.A.D. No family hx of      Cerebrovascular Disease No family hx of      Anesthesia Reaction No family hx of       Deep Vein Thrombosis (DVT) No family hx of        Physical Exam:   /74   Pulse 73   Temp 97.5  F (36.4  C) (Oral)   Resp 16   Wt 70.3 kg (155 lb)   LMP  (LMP Unknown)   SpO2 99%   BMI 29.29 kg/m    Body mass index is 29.29 kg/m .    General Appearance: healthy and alert, no distress     Musculoskeletal: extremities non tender and without edema    Skin: no lesions or rashes     Neurological: normal gait, no gross defects     Psychiatric: appropriate mood and affect                               Hematological: normal cervical, supraclavicular and inguinal lymph nodes     Gastrointestinal:       abdomen soft, non-tender, non-distended, no organomegaly or masses    Genitourinary: Status post WLE left vulva.  Incision intact, well healed, expected postoperative induration along incision line, no evidence for infection or seroma.     Assessment:     Hemalatha Handy is a 82 year old woman with a new diagnosis of basal cell carcinoma vulva status post WLE.       30 minutes spent on the date of the encounter doing chart review, history and exam, documentation and further activities as noted above       Plan:    1.)        Stage IB basal cell carcinoma:  Pathology reveiwed in detail.  Margins negative.  No need for any additional treatment.     Healing well from surgery. Continue close follow-up with regular surveillance exams of vulva.  Should be seen every three months x 2 years, every six months for years 3-5 and then annually.    Can see NP.  Signs and symptoms of recurrence reviewed, precautions given.     Questions answered, she expressed understanding of plan of care.     Jenni Melton MD  Gynecologic Oncology  [facility]    CC  Patient Care Team:  Javier Jones MD as PCP - General  Javier Jones MD as Assigned PCP  Otoniel Perez MD as Assigned Musculoskeletal Provider  Rachel Mcdonald PA-C as Physician Assistant (Dermatology)  Jenni Melton MD as MD  (Gynecologic Oncology)  Steve Martin MD as Assigned OBGYN Provider  Jenni Melton MD as Assigned Cancer Care Provider  SELF, REFERRED

## 2023-03-14 ENCOUNTER — PATIENT OUTREACH (OUTPATIENT)
Dept: ONCOLOGY | Facility: CLINIC | Age: 83
End: 2023-03-14
Payer: COMMERCIAL

## 2023-03-14 NOTE — PROGRESS NOTES
Scheduling reached out to assist patient with scheduling with Gyn/Onc NP in 6 months.     Patient declined scheduling at this    Patient prefers a call in April/May     Scheduling will reach back out during the requested time period     Alena Tolliver RN     Signed, please scanned into chart.    Jyoti ARREDONDOC

## 2023-03-31 ENCOUNTER — ANCILLARY PROCEDURE (OUTPATIENT)
Dept: CT IMAGING | Facility: CLINIC | Age: 83
End: 2023-03-31
Attending: PHYSICIAN ASSISTANT
Payer: COMMERCIAL

## 2023-03-31 ENCOUNTER — OFFICE VISIT (OUTPATIENT)
Dept: FAMILY MEDICINE | Facility: CLINIC | Age: 83
End: 2023-03-31
Payer: COMMERCIAL

## 2023-03-31 VITALS
OXYGEN SATURATION: 94 % | RESPIRATION RATE: 20 BRPM | HEIGHT: 62 IN | SYSTOLIC BLOOD PRESSURE: 142 MMHG | DIASTOLIC BLOOD PRESSURE: 70 MMHG | TEMPERATURE: 96.8 F | HEART RATE: 79 BPM | WEIGHT: 154.2 LBS | BODY MASS INDEX: 28.37 KG/M2

## 2023-03-31 DIAGNOSIS — R10.30 LOWER ABDOMINAL PAIN: ICD-10-CM

## 2023-03-31 DIAGNOSIS — R10.30 LOWER ABDOMINAL PAIN: Primary | ICD-10-CM

## 2023-03-31 LAB
ALBUMIN SERPL-MCNC: 3.7 G/DL (ref 3.4–5)
ALBUMIN UR-MCNC: NEGATIVE MG/DL
ALP SERPL-CCNC: 78 U/L (ref 40–150)
ALT SERPL W P-5'-P-CCNC: 17 U/L (ref 0–50)
ANION GAP SERPL CALCULATED.3IONS-SCNC: 3 MMOL/L (ref 3–14)
APPEARANCE UR: CLEAR
AST SERPL W P-5'-P-CCNC: 16 U/L (ref 0–45)
BILIRUB SERPL-MCNC: 0.5 MG/DL (ref 0.2–1.3)
BILIRUB UR QL STRIP: NEGATIVE
BUN SERPL-MCNC: 19 MG/DL (ref 7–30)
CALCIUM SERPL-MCNC: 8.9 MG/DL (ref 8.5–10.1)
CAOX CRY #/AREA URNS HPF: ABNORMAL /HPF
CHLORIDE BLD-SCNC: 111 MMOL/L (ref 94–109)
CO2 SERPL-SCNC: 28 MMOL/L (ref 20–32)
COLOR UR AUTO: YELLOW
CREAT SERPL-MCNC: 0.64 MG/DL (ref 0.52–1.04)
ERYTHROCYTE [DISTWIDTH] IN BLOOD BY AUTOMATED COUNT: 13.1 % (ref 10–15)
GFR SERPL CREATININE-BSD FRML MDRD: 88 ML/MIN/1.73M2
GLUCOSE BLD-MCNC: 106 MG/DL (ref 70–99)
GLUCOSE UR STRIP-MCNC: NEGATIVE MG/DL
HCT VFR BLD AUTO: 42.7 % (ref 35–47)
HGB BLD-MCNC: 14 G/DL (ref 11.7–15.7)
HGB UR QL STRIP: ABNORMAL
KETONES UR STRIP-MCNC: NEGATIVE MG/DL
LEUKOCYTE ESTERASE UR QL STRIP: NEGATIVE
LIPASE SERPL-CCNC: 129 U/L (ref 73–393)
MCH RBC QN AUTO: 31.9 PG (ref 26.5–33)
MCHC RBC AUTO-ENTMCNC: 32.8 G/DL (ref 31.5–36.5)
MCV RBC AUTO: 97 FL (ref 78–100)
MUCOUS THREADS #/AREA URNS LPF: PRESENT /LPF
NITRATE UR QL: NEGATIVE
PH UR STRIP: 5.5 [PH] (ref 5–7)
PLATELET # BLD AUTO: 188 10E3/UL (ref 150–450)
POTASSIUM BLD-SCNC: 3.9 MMOL/L (ref 3.4–5.3)
PROT SERPL-MCNC: 6.8 G/DL (ref 6.8–8.8)
RBC # BLD AUTO: 4.39 10E6/UL (ref 3.8–5.2)
RBC #/AREA URNS AUTO: ABNORMAL /HPF
SODIUM SERPL-SCNC: 142 MMOL/L (ref 133–144)
SP GR UR STRIP: >=1.03 (ref 1–1.03)
SQUAMOUS #/AREA URNS AUTO: ABNORMAL /LPF
UROBILINOGEN UR STRIP-ACNC: 0.2 E.U./DL
WBC # BLD AUTO: 4.2 10E3/UL (ref 4–11)
WBC #/AREA URNS AUTO: ABNORMAL /HPF

## 2023-03-31 PROCEDURE — 81001 URINALYSIS AUTO W/SCOPE: CPT | Performed by: PHYSICIAN ASSISTANT

## 2023-03-31 PROCEDURE — 83690 ASSAY OF LIPASE: CPT | Performed by: PHYSICIAN ASSISTANT

## 2023-03-31 PROCEDURE — 74177 CT ABD & PELVIS W/CONTRAST: CPT | Mod: TC | Performed by: RADIOLOGY

## 2023-03-31 PROCEDURE — 85027 COMPLETE CBC AUTOMATED: CPT | Performed by: PHYSICIAN ASSISTANT

## 2023-03-31 PROCEDURE — 36415 COLL VENOUS BLD VENIPUNCTURE: CPT | Performed by: PHYSICIAN ASSISTANT

## 2023-03-31 PROCEDURE — 80053 COMPREHEN METABOLIC PANEL: CPT | Performed by: PHYSICIAN ASSISTANT

## 2023-03-31 PROCEDURE — 99214 OFFICE O/P EST MOD 30 MIN: CPT | Performed by: PHYSICIAN ASSISTANT

## 2023-03-31 RX ORDER — IOPAMIDOL 755 MG/ML
86 INJECTION, SOLUTION INTRAVASCULAR ONCE
Status: COMPLETED | OUTPATIENT
Start: 2023-03-31 | End: 2023-03-31

## 2023-03-31 RX ADMIN — IOPAMIDOL 86 ML: 755 INJECTION, SOLUTION INTRAVASCULAR at 08:58

## 2023-03-31 ASSESSMENT — PAIN SCALES - GENERAL: PAINLEVEL: MILD PAIN (2)

## 2023-03-31 NOTE — PATIENT INSTRUCTIONS
Genna Penny,    Thank you for allowing [facility] to manage your care.    I am unsure of the cause of your symptoms, but your exam is reassuring. We will see what our workup shows.     If you develop worsening/changing symptoms at any time, please call 911 or go to the emergency department for evaluation.    I ordered some lab work, please go to the laboratory to get your studies.    I ordered a CT, please go to the  to check in for the study. You may leave after the CT is done and I will call you with results.    Please allow 1-2 business days for our office to contact you in regards to your laboratory/radiological studies.  If not done so, I encourage you to login into [employer] [email or web address] to review your results as well.     If you have any questions or concerns, please feel free to call us at [phone]    Sincerely,    Tray Singletary PA-C    Did you know?      You can schedule a video visit for follow-up appointments as well as future appointments for certain conditions.  Please see the below link.     [email or web address]    If you have not already done so,  I encourage you to sign up for [employer] ([email or web address]).  This will allow you to review your results, securely communicate with a provider, and schedule virtual visits as well.

## 2023-03-31 NOTE — RESULT ENCOUNTER NOTE
Team - please call patient with results. CT shows no cause for her current symptoms. Very good news. There are several incidental findings including a right lower lung pulmonary nodule and a right sided adrenal gland nodule.  These are very likely to be benign, however I would like her to have an appoint with her primary care provider in the next 3 months to discuss whether this needs follow-up or not.  Please facilitate follow-up appointment.  Please reassure her that her recent vulvar cancer was a basal cell carcinoma which very rarely metastasizes.

## 2023-03-31 NOTE — PROGRESS NOTES
Assessment & Plan   Problem List Items Addressed This Visit    None  Visit Diagnoses     Lower abdominal pain    -  Primary    Relevant Orders    CBC with platelets (Completed)    Comprehensive metabolic panel (BMP + Alb, Alk Phos, ALT, AST, Total. Bili, TP) (Completed)    Lipase (Completed)    UA Macro with Reflex to Micro and Culture - lab collect (Completed)    CT Abdomen Pelvis w Contrast (Completed)    UA Microscopic with Reflex to Culture (Completed)         Very pleasant 82-year-old female here for evaluation of mild lower abdominal discomfort with more frequent and loose stools.  CT of the abdomen shows no acute process such as bowel obstruction, diverticulitis, or others.  There are several incidental findings.  I have low suspicion for the pulmonary or adrenal nodule being metastatic disease from her vulvar cancer given this was due to a BCC, but I do feel discussing this with her primary care provider is prudent.  Remainder of labs show no clear urinary tract infection, hepatobiliary disease, pancreatitis or other worrisome process.  Plan to discharge with an increase in fluids, fiber supplement, and monitoring.  Follow-up in the next 1-2 weeks as needed.    Complete history and physical exam as below. Afebrile with normal vital signs except for elevated bp, which they will monitor at home and contact us if >140/90mmHg greater than 50% of the time.    DDx and Dx discussed with and explained to the pt to their satisfaction.  All questions were answered at this time. Pt expressed understanding of and agreement with this dx, tx, and plan. No further workup warranted and standard medication warnings given. I have given the patient a list of pertinent indications for re-evaluation. Will go to the Emergency Department if symptoms worsen or new concerning symptoms arise. Patient left in no apparent distress.     Ordering of each unique test     BMI:   Estimated body mass index is 28.3 kg/m  as calculated from  "the following:    Height as of this encounter: 1.572 m (5' 1.89\").    Weight as of this encounter: 69.9 kg (154 lb 3.2 oz).     See Patient Instructions    [facility] CORTEZ Penny is a 82 year old, presenting for the following health issues:  No chief complaint on file.    Additional Questions 3/31/2023   Roomed by Mellissa RAMIREZ CMA   Accompanied by no one     Patient Reported Additional Medications 3/31/2023   Patient reports taking the following new medications None     HPI     Change in bowel habits. Has IBS. Lately has discomfort in abdomen that began a few days ago in the low belly. 2/10 and aching. ibu helps. No full bowel movement. Took Imodium twice but didn't seem to help. No bloody/black stools. No diarrhea. No n/v, f/c. 2 weeks of symptoms and urgency to have BM. No fevers, urinary symptoms. Often times will need to poop 30 min after a meal. No history of abd surgery.     Review of Systems   Constitutional, HEENT, cardiovascular, pulmonary, gi and gu systems are negative, except as otherwise noted.      Objective    BP (!) 142/70   Pulse 79   Temp 96.8  F (36  C) (Tympanic)   Resp 20   Ht 1.572 m (5' 1.89\")   Wt 69.9 kg (154 lb 3.2 oz)   LMP  (LMP Unknown)   SpO2 94%   Breastfeeding No   BMI 28.30 kg/m    Body mass index is 28.3 kg/m .  Physical Exam  Vitals and nursing note reviewed.   Constitutional:       General: She is not in acute distress.     Appearance: She is not ill-appearing or diaphoretic.   HENT:      Head: Normocephalic and atraumatic.      Mouth/Throat:      Mouth: Mucous membranes are moist.   Eyes:      Conjunctiva/sclera: Conjunctivae normal.   Cardiovascular:      Rate and Rhythm: Normal rate and regular rhythm.      Heart sounds: Normal heart sounds. No murmur heard.    No friction rub. No gallop.   Pulmonary:      Effort: Pulmonary effort is normal. No respiratory distress.      Breath sounds: Normal breath sounds. No stridor. " No wheezing, rhonchi or rales.   Abdominal:      General: Bowel sounds are normal. There is no distension.      Palpations: Abdomen is soft. There is no mass.      Tenderness: There is no abdominal tenderness. There is no right CVA tenderness, left CVA tenderness, guarding or rebound.      Hernia: No hernia is present.   Skin:     General: Skin is warm and dry.   Neurological:      General: No focal deficit present.      Mental Status: She is alert. Mental status is at baseline.   Psychiatric:         Mood and Affect: Mood normal.         Behavior: Behavior normal.          Results for orders placed or performed in visit on 03/31/23   CT Abdomen Pelvis w Contrast     Status: None    Narrative    CT ABDOMEN PELVIS W CONTRAST 3/31/2023 9:12 AM    CLINICAL HISTORY: lower abd pain with fecal urgency; Lower abdominal  pain    TECHNIQUE: CT scan of the abdomen and pelvis was performed following  injection of IV contrast. Multiplanar reformats were obtained. Dose  reduction techniques were used.  CONTRAST: 86 mL Isovue-370    COMPARISON: None.    FINDINGS:   LOWER CHEST: Moderate right coronary artery calcifications. Small  bilateral fat-containing Bochdalek hernias. Right lower lobe 5 mm  subpleural nodule image 4 series 5.    HEPATOBILIARY: Normal.    SPLEEN: Normal.    PANCREAS: Normal.    ADRENAL GLANDS: Indeterminate 1.1 cm right adrenal nodule. Normal left  adrenal gland.     KIDNEYS/BLADDER: Bilateral renal cysts including a dominant exophytic  7.5 cm left-sided cyst. Multiple left renal stones including a  dominant 1.6 cm lower pole staghorn calculus. Additional 1 to 3 mm  left mid to lower renal calculi. Mild cystocele.    BOWEL: Normal caliber small bowel without inflammatory changes. Normal  appendix. Mild colonic diverticulosis without evidence of acute  diverticulitis. Mild colonic stool burden. Tiny fat-containing  umbilical hernia. No free air or free fluid.    LYMPH NODES: Normal.    VASCULATURE: Moderate  to severe aortoiliac atherosclerosis. Ectatic  infrarenal abdominal aorta measuring 2.5 cm. Patent central SMA and  SMV.    PELVIC ORGANS: Small amount of pelvic free fluid. Surgical changes in  the left vulvar region consistent with the history of previous  resection. Expected postoperative stranding/edema. No discrete mass is  identified. Medial right groin 1.8 cm subcutaneous simple cystic  lesion.    MUSCULOSKELETAL: Severe central canal narrowing at L5-S1 secondary to  a diffuse disc bulge and ligamentum flavum hypertrophy. Associated  mild bilateral foraminal narrowing. Small sclerotic focus in the left  iliac bone, likely a bone island. Spinal and pelvic degenerative  changes. Osseous mineralization.      Impression    IMPRESSION:   1.  No acute findings to explain the patient's symptoms. No  inflammatory process, bowel obstruction or hydronephrosis.  2.  Severe central spinal canal stenosis at L5-S1 secondary to a  diffuse disc bulge and ligamentum flavum hypertrophy.  3.  Multiple nonobstructing left renal calculi including a dominant  lower pole 1.6 cm staghorn calculus.  4.  Mild colonic diverticulosis without evidence of acute  diverticulitis. Mild colonic stool burden.  5.  Left vulvar post surgical changes. No convincing CT evidence of  residual or recurrent tumor. Follow-up with pelvic MRI may be helpful  as clinically warranted.  6.  Incidental small medial right groin subcutaneous cyst, likely an  epidermal inclusion cyst.  7.  Right lower lobe 5 mm pulmonary nodule. Given the patient's  history of vulvar cancer, recommend 3 month chest CT follow-up.  8.  Indeterminant right adrenal gland nodule. This is favored to  represent an adenoma. However, given the patient's cancer history  recommend correlation with any previous outside facility imaging or  nonemergent follow-up with adrenal CT.    KATHRYN JACOBS MD         SYSTEM ID:  [id number]   Results for orders placed or performed in visit on  03/31/23   CBC with platelets     Status: Normal   Result Value Ref Range    WBC Count 4.2 4.0 - 11.0 10e3/uL    RBC Count 4.39 3.80 - 5.20 10e6/uL    Hemoglobin 14.0 11.7 - 15.7 g/dL    Hematocrit 42.7 35.0 - 47.0 %    MCV 97 78 - 100 fL    MCH 31.9 26.5 - 33.0 pg    MCHC 32.8 31.5 - 36.5 g/dL    RDW 13.1 10.0 - 15.0 %    Platelet Count 188 150 - 450 10e3/uL   Comprehensive metabolic panel (BMP + Alb, Alk Phos, ALT, AST, Total. Bili, TP)     Status: Abnormal   Result Value Ref Range    Sodium 142 133 - 144 mmol/L    Potassium 3.9 3.4 - 5.3 mmol/L    Chloride 111 (H) 94 - 109 mmol/L    Carbon Dioxide (CO2) 28 20 - 32 mmol/L    Anion Gap 3 3 - 14 mmol/L    Urea Nitrogen 19 7 - 30 mg/dL    Creatinine 0.64 0.52 - 1.04 mg/dL    Calcium 8.9 8.5 - 10.1 mg/dL    Glucose 106 (H) 70 - 99 mg/dL    Alkaline Phosphatase 78 40 - 150 U/L    AST 16 0 - 45 U/L    ALT 17 0 - 50 U/L    Protein Total 6.8 6.8 - 8.8 g/dL    Albumin 3.7 3.4 - 5.0 g/dL    Bilirubin Total 0.5 0.2 - 1.3 mg/dL    GFR Estimate 88 >60 mL/min/1.73m2   Lipase     Status: Normal   Result Value Ref Range    Lipase 129 73 - 393 U/L   UA Macro with Reflex to Micro and Culture - lab collect     Status: Abnormal    Specimen: Urine, Clean Catch   Result Value Ref Range    Color Urine Yellow Colorless, Straw, Light Yellow, Yellow    Appearance Urine Clear Clear    Glucose Urine Negative Negative mg/dL    Bilirubin Urine Negative Negative    Ketones Urine Negative Negative mg/dL    Specific Gravity Urine >=1.030 1.003 - 1.035    Blood Urine Moderate (A) Negative    pH Urine 5.5 5.0 - 7.0    Protein Albumin Urine Negative Negative mg/dL    Urobilinogen Urine 0.2 0.2, 1.0 E.U./dL    Nitrite Urine Negative Negative    Leukocyte Esterase Urine Negative Negative   UA Microscopic with Reflex to Culture     Status: Abnormal   Result Value Ref Range    RBC Urine 5-10 (A) 0-2 /HPF /HPF    WBC Urine 0-5 0-5 /HPF /HPF    Squamous Epithelials Urine Few (A) None Seen /LPF    Mucus  Urine Present (A) None Seen /LPF    Calcium Oxalate Crystals Urine Moderate (A) None Seen /HPF    Narrative    Urine Culture not indicated

## 2023-04-02 DIAGNOSIS — R31.29 OTHER MICROSCOPIC HEMATURIA: Primary | ICD-10-CM

## 2023-04-03 LAB
CREAT BLD-MCNC: 0.7 MG/DL (ref 0.5–1)
GFR SERPL CREATININE-BSD FRML MDRD: >60 ML/MIN/1.73M2

## 2023-04-03 PROCEDURE — 82565 ASSAY OF CREATININE: CPT

## 2023-04-07 DIAGNOSIS — F41.9 ANXIETY: ICD-10-CM

## 2023-04-07 RX ORDER — ALPRAZOLAM 0.25 MG
TABLET ORAL
Qty: 30 TABLET | Refills: 0 | Status: SHIPPED | OUTPATIENT
Start: 2023-04-07 | End: 2023-05-02

## 2023-04-10 ENCOUNTER — OFFICE VISIT (OUTPATIENT)
Dept: FAMILY MEDICINE | Facility: CLINIC | Age: 83
End: 2023-04-10
Payer: COMMERCIAL

## 2023-04-10 VITALS
BODY MASS INDEX: 28.38 KG/M2 | TEMPERATURE: 98.4 F | OXYGEN SATURATION: 98 % | WEIGHT: 154.6 LBS | HEART RATE: 76 BPM | DIASTOLIC BLOOD PRESSURE: 78 MMHG | SYSTOLIC BLOOD PRESSURE: 160 MMHG

## 2023-04-10 DIAGNOSIS — R91.1 PULMONARY NODULE: ICD-10-CM

## 2023-04-10 DIAGNOSIS — N20.0 KIDNEY STONE: ICD-10-CM

## 2023-04-10 DIAGNOSIS — K59.00 CONSTIPATION, UNSPECIFIED CONSTIPATION TYPE: ICD-10-CM

## 2023-04-10 DIAGNOSIS — R31.9 HEMATURIA, UNSPECIFIED TYPE: Primary | ICD-10-CM

## 2023-04-10 DIAGNOSIS — R31.29 OTHER MICROSCOPIC HEMATURIA: ICD-10-CM

## 2023-04-10 DIAGNOSIS — K57.30 DIVERTICULOSIS OF LARGE INTESTINE WITHOUT HEMORRHAGE: ICD-10-CM

## 2023-04-10 DIAGNOSIS — R31.29 MICROSCOPIC HEMATURIA: ICD-10-CM

## 2023-04-10 DIAGNOSIS — F41.9 ANXIETY: ICD-10-CM

## 2023-04-10 LAB
ALBUMIN UR-MCNC: NEGATIVE MG/DL
APPEARANCE UR: ABNORMAL
BILIRUB UR QL STRIP: NEGATIVE
COLOR UR AUTO: YELLOW
GLUCOSE UR STRIP-MCNC: NEGATIVE MG/DL
HGB UR QL STRIP: ABNORMAL
KETONES UR STRIP-MCNC: NEGATIVE MG/DL
LEUKOCYTE ESTERASE UR QL STRIP: NEGATIVE
NITRATE UR QL: NEGATIVE
PH UR STRIP: 6 [PH] (ref 5–7)
RBC #/AREA URNS AUTO: ABNORMAL /HPF
SP GR UR STRIP: 1.01 (ref 1–1.03)
SQUAMOUS #/AREA URNS AUTO: ABNORMAL /LPF
UROBILINOGEN UR STRIP-ACNC: 0.2 E.U./DL
WBC #/AREA URNS AUTO: ABNORMAL /HPF

## 2023-04-10 PROCEDURE — 99214 OFFICE O/P EST MOD 30 MIN: CPT | Performed by: FAMILY MEDICINE

## 2023-04-10 PROCEDURE — 81001 URINALYSIS AUTO W/SCOPE: CPT | Performed by: FAMILY MEDICINE

## 2023-04-10 ASSESSMENT — PAIN SCALES - GENERAL: PAINLEVEL: MILD PAIN (2)

## 2023-04-10 NOTE — PROGRESS NOTES
Subjective   Hemalatha is a 82 year old, presenting for the following health issues:  Abdominal Pain and Health Maintenance        4/10/2023     9:17 AM   Additional Questions   Roomed by cady zepeda         4/10/2023     9:17 AM   Patient Reported Additional Medications   Patient reports taking the following new medications omeprazole     HPI     Pain History:  When did you first notice your pain? 3 weeks   Have you seen anyone else for your pain? No  How has your pain affected your ability to work? Not applicable  Where in your body do you have pain? Abdominal/Flank Pain  Onset/Duration: x3 weeks  Description:   Character: Dull ache  Location: left upper quadrant  Radiation: None  Intensity: currently 2/10 at its worse 2/10  Progression of Symptoms:  same and constant  Accompanying Signs & Symptoms:  Fever/Chills: No  Gas/Bloating: YES  Nausea: No  Vomitting: No  Diarrhea: No  Constipation: No  Dysuria or Hematuria: No  History:   Trauma: No  Previous similar pain: YES  Previous tests done: CT  Precipitating factors:   Does the pain change with:     Food: No    Bowel Movement: No    Urination: No   Other factors:  No  Therapies tried and outcome: OTC omeprazole   No LMP recorded (lmp unknown). Patient is postmenopausal.                Review of Systems    bms different    Some soft bms, not diarrhea    Wondering about blockage?    Was seen for this    Omeprazole helps some    Trying to drink fluids    Overall feeling about the same    No pain after eating    Some gas, more than usual      Objective    LMP  (LMP Unknown)   There is no height or weight on file to calculate BMI.  Physical Exam  Constitutional:       Appearance: She is well-developed.   HENT:      Head: Normocephalic and atraumatic.   Eyes:      Conjunctiva/sclera: Conjunctivae normal.   Neck:      Vascular: No carotid bruit.   Cardiovascular:      Rate and Rhythm: Normal rate and regular rhythm.      Heart sounds: Normal heart sounds.   Pulmonary:       Effort: Pulmonary effort is normal. No respiratory distress.      Breath sounds: Normal breath sounds.   Abdominal:      General: There is no distension.      Palpations: Abdomen is soft.      Tenderness: There is abdominal tenderness (mild discomfort on left side and centrally, but no peritoneal signs). There is no right CVA tenderness or left CVA tenderness.   Neurological:      Mental Status: She is alert and oriented to person, place, and time.              ASSESSMENT / PLAN:  (R31.9) Hematuria, unspecified type  (primary encounter diagnosis)  Comment: patient still had some persistent microscopic hematuria here in clnic  Plan: UA with Microscopic reflex to Culture - Clinic         Collect             (N20.0) Kidney stone  Comment: this is a possible source for hematuria but can't rule out bladder etiology for hematuria  Plan: patient to see urology     (F41.9) Anxiety  Comment: on chronic med for this, stable   Plan: no change     (R31.29) Other microscopic hematuria  Comment: as above   Plan: UA Microscopic with Reflex to Culture        Patient to schedule with urology     (R31.29) Microscopic hematuria  Comment: as above   Plan: Adult Urology  Referral             (K59.00) Constipation, unspecified constipation type  Comment: patient just started fiber yesteday  Plan: continue this.  Try not to strain.  Monitor symptoms.     (K57.30) Diverticulosis of large intestine without hemorrhage  Comment: no diverticulitis on CT   Plan:      (R91.1) Pulmonary nodule  Comment: could recheck this and adrenal lesion ( likely adenoma ) in a few months  Plan: as above       I reviewed the patient's medications, allergies, medical history, family history, and social history.    Javier Jones MD

## 2023-04-10 NOTE — PATIENT INSTRUCTIONS
Stay well hydrated    Schedule consult with urology    Stay on metamucil    Increase walking/ activity    Try not to strain too much for bowels

## 2023-04-19 ENCOUNTER — OFFICE VISIT (OUTPATIENT)
Dept: DERMATOLOGY | Facility: CLINIC | Age: 83
End: 2023-04-19
Payer: COMMERCIAL

## 2023-04-19 ENCOUNTER — OFFICE VISIT (OUTPATIENT)
Dept: UROLOGY | Facility: CLINIC | Age: 83
End: 2023-04-19
Attending: UROLOGY
Payer: COMMERCIAL

## 2023-04-19 VITALS — HEART RATE: 90 BPM | SYSTOLIC BLOOD PRESSURE: 158 MMHG | OXYGEN SATURATION: 94 % | DIASTOLIC BLOOD PRESSURE: 70 MMHG

## 2023-04-19 DIAGNOSIS — Z85.828 HISTORY OF SKIN CANCER: ICD-10-CM

## 2023-04-19 DIAGNOSIS — D18.01 CHERRY ANGIOMA: ICD-10-CM

## 2023-04-19 DIAGNOSIS — L81.4 LENTIGO: ICD-10-CM

## 2023-04-19 DIAGNOSIS — R31.29 MICROSCOPIC HEMATURIA: ICD-10-CM

## 2023-04-19 DIAGNOSIS — D22.9 MULTIPLE BENIGN NEVI: ICD-10-CM

## 2023-04-19 DIAGNOSIS — L82.1 SEBORRHEIC KERATOSIS: Primary | ICD-10-CM

## 2023-04-19 DIAGNOSIS — D48.5 NEOPLASM OF UNCERTAIN BEHAVIOR OF SKIN: ICD-10-CM

## 2023-04-19 LAB
ALBUMIN UR-MCNC: NEGATIVE MG/DL
APPEARANCE UR: CLEAR
BACTERIA #/AREA URNS HPF: ABNORMAL /HPF
BILIRUB UR QL STRIP: NEGATIVE
COLOR UR AUTO: YELLOW
GLUCOSE UR STRIP-MCNC: NEGATIVE MG/DL
HGB UR QL STRIP: ABNORMAL
KETONES UR STRIP-MCNC: NEGATIVE MG/DL
LEUKOCYTE ESTERASE UR QL STRIP: NEGATIVE
NITRATE UR QL: NEGATIVE
PH UR STRIP: 5.5 [PH] (ref 5–7)
RBC #/AREA URNS AUTO: ABNORMAL /HPF
SP GR UR STRIP: 1.02 (ref 1–1.03)
UROBILINOGEN UR STRIP-ACNC: 0.2 E.U./DL
WBC #/AREA URNS AUTO: ABNORMAL /HPF

## 2023-04-19 PROCEDURE — 99203 OFFICE O/P NEW LOW 30 MIN: CPT | Mod: 25 | Performed by: PHYSICIAN ASSISTANT

## 2023-04-19 PROCEDURE — 81001 URINALYSIS AUTO W/SCOPE: CPT | Performed by: UROLOGY

## 2023-04-19 PROCEDURE — 52000 CYSTOURETHROSCOPY: CPT | Performed by: UROLOGY

## 2023-04-19 PROCEDURE — 88305 TISSUE EXAM BY PATHOLOGIST: CPT | Performed by: DERMATOLOGY

## 2023-04-19 PROCEDURE — 17110 DESTRUCTION B9 LES UP TO 14: CPT | Performed by: PHYSICIAN ASSISTANT

## 2023-04-19 PROCEDURE — 99205 OFFICE O/P NEW HI 60 MIN: CPT | Mod: 25 | Performed by: UROLOGY

## 2023-04-19 PROCEDURE — 11102 TANGNTL BX SKIN SINGLE LES: CPT | Mod: 59 | Performed by: PHYSICIAN ASSISTANT

## 2023-04-19 NOTE — PATIENT INSTRUCTIONS
Wound Care Instructions     FOR SUPERFICIAL WOUNDS     [facility], [facility] or    [facility] [phone]          AFTER 24 HOURS YOU SHOULD REMOVE THE BANDAGE AND BEGIN DAILY DRESSING CHANGES AS FOLLOWS:     1) Remove Dressing.     2) Clean and dry the area with tap water using a Q-tip or sterile gauze pad.     3) Apply Vaseline, Aquaphor, Polysporin ointment or Bacitracin ointment over entire wound.  Do NOT use Neosporin ointment.     4) Cover the wound with a band-aid, or a sterile non-stick gauze pad and micropore paper tape      REPEAT THESE INSTRUCTIONS AT LEAST ONCE A DAY UNTIL THE WOUND HAS COMPLETELY HEALED.    It is an old wives tale that a wound heals better when it is exposed to air and allowed to dry out. The wound will heal faster with a better cosmetic result if it is kept moist with ointment and covered with a bandage.    **Do not let the wound dry out.**      Supplies Needed:      *Cotton tipped applicators (Q-tips)    *Polysporin Ointment or Bacitracin Ointment (NOT NEOSPORIN)    *Band-aids or non-stick gauze pads and micropore paper tape.      PATIENT INFORMATION:    During the healing process you will notice a number of changes. All wounds develop a small halo of redness surrounding the wound.  This means healing is occurring. Severe itching with extensive redness usually indicates sensitivity to the ointment or bandage tape used to dress the wound.  You should call our office if this develops.      Swelling  and/or discoloration around your surgical site is common, particularly when performed around the eye.    All wounds normally drain.  The larger the wound the more drainage there will be.  After 7-10 days, you will notice the wound beginning to shrink and new skin will begin to grow.  The wound is healed when you can see skin has formed over the entire area.  A healed wound has a healthy, shiny look to the surface and is red to dark pink in color to normalize.   Wounds may take approximately 4-6 weeks to heal.  Larger wounds may take 6-8 weeks.  After the wound is healed you may discontinue dressing changes.    You may experience a sensation of tightness as your wound heals. This is normal and will gradually subside.    Your healed wound may be sensitive to temperature changes. This sensitivity improves with time, but if you re having a lot of discomfort, try to avoid temperature extremes.    Patients frequently experience itching after their wound appears to have healed because of the continue healing under the skin.  Plain Vaseline will help relieve the itching.        POSSIBLE COMPLICATIONS    BLEEDING:    Leave the bandage in place.  Use tightly rolled up gauze or a cloth to apply direct pressure over the bandage for 30  minutes.  Reapply pressure for an additional 30 minutes if necessary  Use additional gauze and tape to maintain pressure once the bleeding has stopped.         WOUND CARE INSTRUCTIONS  FOR CRYOSURGERY  For questions please call [phone]        This area treated with liquid nitrogen will form a blister. You do not need to bandage the area until after the blister forms and breaks (which may be a few days).  When the blister breaks, begin daily dressing changes as follows:    1) Clean and dry the area with tap water using clean Q-tip or sterile gauze pad.    2) Apply Vaseline or Aquaphor over entire wound. Other options include Polysporin ointment or Bacitracin ointment over entire wound.  Do NOT use Neosporin ointment.    3) Cover the wound with a band-aid or sterile non-stick gauze pad and micropore paper tape.      REPEAT THESE INSTRUCTIONS AT LEAST ONCE A DAY UNTIL THE WOUND HAS COMPLETELY HEALED.        It is an old wives tale that a wound heals better when it is exposed to air and allowed to dry out. The wound will heal faster with a better cosmetic result if it is kept moist with ointment and covered with a bandage.  Do not let the wound dry  out.      Supplies Needed:     *Cotton tipped applicators (Q-tips)   *Polysporin ointment or Bacitracin ointment (NOT NEOSPORIN)   *Band-aids, or non stick gauze pads and micropore paper tape    PATIENT INFORMATION    During the healing process you will notice a number of changes. All wounds develop a small halo of redness surrounding the wound.  This means healing is occurring. Severe itching with extensive redness usually indicates sensitivity to the ointment or bandage tape used to dress the wound.  You should call our office if this develops.      Swelling and/or discoloration around your surgical site is common, particularly when performed around the eye.    All wounds normally drain.  The larger the wound the more drainage there will be.  After 7-10 days, you will notice the wound beginning to shrink and new skin will begin to grow.  The wound is healed when you can see skin has formed over the entire area.  A healed wound has a healthy, shiny look to the surface and is red to dark pink in color to normalize.  Wounds may take approximately 4-6 weeks to heal.  Larger wounds may take 6-8 weeks.  After the wound is healed you may discontinue dressing changes.    You may experience a sensation of tightness as your wound heals. This is normal and will gradually subside.    Your healed wound may be sensitive to temperature changes. This sensitivity improves with time, but if you re having a lot of discomfort, try to avoid temperature extremes.    Patients frequently experience itching after their wound appears to have healed because of the continue healing under the skin.  Plain Vaseline will help relieve the itching.

## 2023-04-19 NOTE — PROGRESS NOTES
Hemalatha Handy is an extremely pleasant 82 year old year old female patient here today for skin check. She has basal cell carcinoma removed left groin. She denies any new or painful skin lesions.  Patient has no other skin complaints today.  Remainder of the HPI, Meds, PMH, Allergies, FH, and SH was reviewed in chart.    Pertinent Hx:   History of BCC  Past Medical History:   Diagnosis Date     Actinic keratosis      Anxiety      Basal cell carcinoma pt unsure    abdomen, and elsewhere     Cervical cancer (H) early     stage 3 txd.  she did not have hysterectomy     Colorectal polyps 2008    1 Polyp     Gastroesophageal reflux disease without esophagitis      HLD (hyperlipidemia)      Hypertension      Vulvar cancer (H) 2022       Past Surgical History:   Procedure Laterality Date     COLONOSCOPY  2008     ENT SURGERY  2012    Left lower lip lesion     EXCISE VULVA WIDE LOCAL Left 2023    Procedure: left pelvic exam  under anesthesia, wide local excision left vulva;  Surgeon: Jenni Melton MD;  Location: [facility] OR        Family History   Problem Relation Age of Onset     Hypertension Mother      Hypertension Brother      Cancer Son         hodgkins     C.A.D. 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 No   Social History Narrative     Not on file     Social Determinants of Health     Financial Resource Strain: Not on file   Food Insecurity: Not on file   Transportation Needs: Not on file   Physical Activity: Not on file   Stress: Not on file   Social Connections: Not on file   Intimate Partner Violence: Not on file   Housing Stability: Not on file       Outpatient Encounter Medications as of 4/19/2023   Medication Sig Dispense Refill     ALPRAZolam (XANAX) 0.25 MG tablet TAKE 1 TABLET(0.25 MG) BY MOUTH THREE TIMES DAILY AS NEEDED FOR ANXIETY 30 tablet 0     chlorhexidine (PERIDEX) 0.12 % solution SWISH AND SPIT 15 ML BY MOUTH TWICE DAILY AS NEEDED 473 mL 1     citalopram (CELEXA) 20 MG tablet TAKE 1 TABLET(20 MG) BY MOUTH DAILY (Patient taking differently: Take 20 mg by mouth every morning) 90 tablet 1     meclizine (ANTIVERT) 25 MG tablet Take 1 tablet (25 mg) by mouth 3 times daily as needed for dizziness 30 tablet 2     omeprazole (PRILOSEC) 20 MG DR capsule Take 20 mg by mouth daily       clotrimazole (LOTRIMIN) 1 % external cream Apply topically 2 times daily as needed (fungal rash) (Patient not taking: Reported on 3/31/2023) 60 g 1     diphenhydrAMINE (BENADRYL) 25 MG tablet Take 25 mg by mouth every 6 hours as needed for itching or allergies (Patient not taking: Reported on 3/31/2023)       lisinopril (ZESTRIL) 10 MG tablet TAKE 1 TABLET(10 MG) BY MOUTH DAILY 90 tablet 0     MOTRIN 800 MG OR TABS Take by mouth every 6 hours as needed       multivitamin w/minerals (THERA-VIT-M) tablet Take 1 tablet by mouth daily (Patient not taking: Reported on 3/31/2023)       oxyCODONE (ROXICODONE) 5 MG tablet Take 1 tablet (5 mg) by mouth every 6 hours as needed for pain (Patient not taking: Reported on  3/31/2023) 3 tablet 0     No facility-administered encounter medications on file as of 4/19/2023.             O:   NAD, WDWN, Alert & Oriented, Mood & Affect wnl, Vitals stable   Here today alone   LMP  (LMP Unknown)    General appearance normal   Vitals stable   Alert, oriented and in no acute distress     0.5 cm pink pearly papule on right medial thigh   Stuck on papules and brown macules on trunk and ext   Red papules on trunk  Brown papules and macules with regular pigment network and borders on torso and extremities   Well healed scar on left groin    The remainder of skin exam is normal     Eyes: Conjunctivae/lids:Normal     ENT: Lips: normal    MSK:Normal    Cardiovascular: peripheral edema none    Pulm: Breathing Normal    Neuro/Psych: Orientation:Alert and Orientedx3 ; Mood/Affect:normal     A/P:  1. R/O BCC on right medial thigh  TANGENTIAL BIOPSY SENT OUT:  After consent, anesthesia with LEC and prep, tangential excision performed and specimen sent out for permanent section histology.  No complications and routine wound care. Patient told to call our office in 1-2 weeks for result.      2. Inflamed seborrheic keratosis on chest x 10  LN2:  Treated with LN2 for 5s for 1-2 cycles. Warned risks of blistering, pain, pigment change, scarring, and incomplete resolution.  Advised patient to return if lesions do not completely resolve.  Wound care sheet given.  3.  Seborrheic keratosis, lentigo, angioma, benign nevi, History of BCC  It was a pleasure speaking to Hemalatha Handy today.  BENIGN LESIONS DISCUSSED WITH PATIENT:  I discussed the specifics of tumor, prognosis, and genetics of benign lesions.  I explained that treatment of these lesions would be purely cosmetic and not medically neccessary.  I discussed with patient different removal options including excision, cautery and /or laser.      Nature and genetics of benign skin lesions dicussed with patient.  Signs and Symptoms of skin cancer discussed  with patient.  ABCDEs of melanoma reviewed with patient.  Patient encouraged to perform monthly skin exams.  UV precautions reviewed with patient.  Risks of non-melanoma skin cancer discussed with patient   Return to clinic pending biopsy results.

## 2023-04-19 NOTE — LETTER
4/19/2023         RE: Hemalatha Handy  [address]  [place] [facility] [address]        Dear Colleague,    Thank you for referring your patient, Hemalatha Handy, to the [facility]. Please see a copy of my visit note below.    Hemalatha Handy is an extremely pleasant 82 year old year old female patient here today for skin check. She has basal cell carcinoma removed left groin. She denies any new or painful skin lesions.  Patient has no other skin complaints today.  Remainder of the HPI, Meds, PMH, Allergies, FH, and SH was reviewed in chart.    Pertinent Hx:   History of BCC  Past Medical History:   Diagnosis Date     Actinic keratosis      Anxiety      Basal cell carcinoma pt unsure    abdomen, and elsewhere     Cervical cancer (H) early 1960's    stage 3 txd.  she did not have hysterectomy     Colorectal polyps 01/01/2008    1 Polyp     Gastroesophageal reflux disease without esophagitis      HLD (hyperlipidemia)      Hypertension      Vulvar cancer (H) 11/2022       Past Surgical History:   Procedure Laterality Date     COLONOSCOPY  09/18/2008     ENT SURGERY  07/12/2012    Left lower lip lesion     EXCISE VULVA WIDE LOCAL Left 1/11/2023    Procedure: left pelvic exam  under anesthesia, wide local excision left vulva;  Surgeon: Jenni Melton MD;  [facility]        Family History   Problem Relation Age of Onset     Hypertension Mother      Hypertension Brother      Cancer Son         hodgkins     C.A.D. 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 No   Social History Narrative     Not on file     Social Determinants of Health     Financial Resource Strain: Not on file   Food Insecurity: Not on file   Transportation Needs: Not on file   Physical Activity: Not on file   Stress: Not on file   Social Connections: Not on file   Intimate Partner Violence: Not on file   Housing Stability: Not on file       Outpatient Encounter Medications as of 2023   Medication Sig Dispense Refill     ALPRAZolam (XANAX) 0.25 MG tablet TAKE 1 TABLET(0.25 MG) BY MOUTH THREE TIMES DAILY AS NEEDED FOR ANXIETY 30 tablet 0     chlorhexidine (PERIDEX) 0.12 % solution SWISH AND SPIT 15 ML BY MOUTH TWICE DAILY AS NEEDED 473 mL 1     citalopram (CELEXA) 20 MG tablet TAKE 1 TABLET(20 MG) BY MOUTH DAILY (Patient taking differently: Take 20 mg by mouth every morning) 90 tablet 1     meclizine (ANTIVERT) 25 MG tablet Take 1 tablet (25 mg) by mouth 3 times daily as needed for dizziness 30 tablet 2     omeprazole (PRILOSEC) 20 MG DR capsule Take 20 mg by mouth daily       clotrimazole (LOTRIMIN) 1 % external cream Apply topically 2 times daily as needed (fungal rash) (Patient not taking: Reported on 3/31/2023) 60 g 1     diphenhydrAMINE (BENADRYL) 25 MG tablet Take 25 mg by mouth every 6 hours as needed for itching or allergies (Patient not taking: Reported on 3/31/2023)       lisinopril (ZESTRIL) 10 MG tablet TAKE 1 TABLET(10 MG) BY MOUTH DAILY 90 tablet 0     MOTRIN 800 MG OR TABS Take by mouth every 6 hours as needed        multivitamin w/minerals (THERA-VIT-M) tablet Take 1 tablet by mouth daily (Patient not taking: Reported on 3/31/2023)       oxyCODONE (ROXICODONE) 5 MG tablet Take 1 tablet (5 mg) by mouth every 6 hours as needed for pain (Patient not taking: Reported on 3/31/2023) 3 tablet 0     No facility-administered encounter medications on file as of 4/19/2023.             O:   NAD, WDWN, Alert & Oriented, Mood & Affect wnl, Vitals stable   Here today alone   LMP  (LMP Unknown)    General appearance normal   Vitals stable   Alert, oriented and in no acute distress     0.5 cm pink pearly papule on right medial thigh   Stuck on papules and brown macules on trunk and ext   Red papules on trunk  Brown papules and macules with regular pigment network and borders on torso and extremities   Well healed scar on left groin    The remainder of skin exam is normal     Eyes: Conjunctivae/lids:Normal     ENT: Lips: normal    MSK:Normal    Cardiovascular: peripheral edema none    Pulm: Breathing Normal    Neuro/Psych: Orientation:Alert and Orientedx3 ; Mood/Affect:normal     A/P:  1. R/O BCC on right medial thigh  TANGENTIAL BIOPSY SENT OUT:  After consent, anesthesia with LEC and prep, tangential excision performed and specimen sent out for permanent section histology.  No complications and routine wound care. Patient told to call our office in 1-2 weeks for result.      2. Inflamed seborrheic keratosis on chest x 10  LN2:  Treated with LN2 for 5s for 1-2 cycles. Warned risks of blistering, pain, pigment change, scarring, and incomplete resolution.  Advised patient to return if lesions do not completely resolve.  Wound care sheet given.  3.  Seborrheic keratosis, lentigo, angioma, benign nevi, History of BCC  It was a pleasure speaking to Hemalatha Handy today.  BENIGN LESIONS DISCUSSED WITH PATIENT:  I discussed the specifics of tumor, prognosis, and genetics of benign lesions.  I explained that treatment of these lesions would be  purely cosmetic and not medically neccessary.  I discussed with patient different removal options including excision, cautery and /or laser.      Nature and genetics of benign skin lesions dicussed with patient.  Signs and Symptoms of skin cancer discussed with patient.  ABCDEs of melanoma reviewed with patient.  Patient encouraged to perform monthly skin exams.  UV precautions reviewed with patient.  Risks of non-melanoma skin cancer discussed with patient   Return to clinic pending biopsy results.         Again, thank you for allowing me to participate in the care of your patient.        Sincerely,        Rachel Ricks PA-C

## 2023-04-19 NOTE — PROGRESS NOTES
Hemalatha Handy is a 82 year old female seen in consultation for microhematuria. Consult from Javier Jones.      Microhematuria found incidentally; CT reveals left lower pole partial stag 1.6 cm    Pt denies other  concerns today. Specifically denies dysuria, gross hematuria, frequency.    Denies prior stones. FH positive for stones in her son. No other  FH.    Hx tobacco from age 18 to about 60; none since.     Drinks 2 caf coffee, 3-4 [place] per day.    Hx vulvar cancer noted; presently SURJIT.      Reviewed PMH in detail including vulvar cancer, HTN, GERD, meniscus tear, anxiety, loer abd pain, pulmonary nodule, diverticulosis, constipation      Past Medical History:   Diagnosis Date     Actinic keratosis      Anxiety      Basal cell carcinoma pt unsure    abdomen, and elsewhere     Cervical cancer (H) early     stage 3 txd.  she did not have hysterectomy     Colorectal polyps 2008    1 Polyp     Gastroesophageal reflux disease without esophagitis      HLD (hyperlipidemia)      Hypertension      Vulvar cancer (H) 2022       Past Surgical History:   Procedure Laterality Date     COLONOSCOPY  2008     ENT SURGERY  2012    Left lower lip lesion     EXCISE VULVA WIDE LOCAL Left 2023    Procedure: left pelvic exam  under anesthesia, wide local excision left vulva;  Surgeon: Jenni Melton MD;  Location: [facility] OR       Social History     Socioeconomic History     Marital status: Single     Spouse name: Not on file     Number of children: Not on file     Years of education: Not on file     Highest education level: Not on file   Occupational History     Employer: [employer]     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 No   Social History Narrative     Not on file     Social Determinants of Health     Financial Resource Strain: Not on file   Food Insecurity: Not on file   Transportation Needs: Not on file   Physical Activity: Not on file   Stress: Not on file   Social Connections: Not on file   Intimate Partner Violence: Not on file   Housing Stability: Not on file       Current Outpatient Medications   Medication Sig Dispense Refill     ALPRAZolam (XANAX) 0.25 MG tablet TAKE 1 TABLET(0.25 MG) BY MOUTH THREE TIMES DAILY AS NEEDED FOR ANXIETY 30 tablet 0     chlorhexidine (PERIDEX) 0.12 % solution SWISH AND SPIT 15 ML BY MOUTH TWICE DAILY AS NEEDED 473 mL 1     citalopram (CELEXA) 20 MG tablet TAKE 1 TABLET(20 MG) BY MOUTH DAILY (Patient taking differently: Take 20 mg by mouth every morning) 90 tablet 1     lisinopril (ZESTRIL) 10 MG tablet TAKE 1 TABLET(10 MG) BY MOUTH DAILY 90 tablet 0     MOTRIN 800 MG OR TABS Take by mouth every 6 hours as needed       multivitamin w/minerals (THERA-VIT-M) tablet Take 1 tablet by mouth daily       omeprazole (PRILOSEC) 20 MG DR capsule Take 20 mg by mouth daily       meclizine (ANTIVERT) 25 MG tablet Take 1 tablet (25 mg) by mouth 3 times daily as needed for dizziness (Patient not taking: Reported on 4/19/2023) 30 tablet 2       Physical Exam:    GENL: NAD.    ABD: Soft, non-tender, no masses.    EG: Poorly-estrogenized, no masses.    VAGINA: Poorly-estrogenized, no masses.    BN HYPERMOBILITY: None.    CYSTOCELE: Minimal.    APICAL PROLAPSE: None.    RECTOCELE: None.    BIMANUAL: No mass or tenderness.    Cysto:    (Informed consent obtained. Pause for cause performed)   Sterile prep.    17 Fr scope inserted through urethra. Systematic examination w 70 degree lens.   PVR: 5  cc   MUCOSA: Normal without lesion   ORIFICES: Normal location and morphology   Scope withdrawn without untoward effect.    (Pt tolerated procedure without difficulty).                      Today:    Results for orders placed or performed in visit on 04/19/23   UA reflex to Microscopic     Status: Abnormal   Result Value Ref Range    Color Urine Yellow Colorless, Straw, Light Yellow, Yellow    Appearance Urine Clear Clear    Glucose Urine Negative Negative mg/dL    Bilirubin Urine Negative Negative    Ketones Urine Negative Negative mg/dL    Specific Gravity Urine 1.020 1.003 - 1.035    Blood Urine Moderate (A) Negative    pH Urine 5.5 5.0 - 7.0    Protein Albumin Urine Negative Negative mg/dL    Urobilinogen Urine 0.2 0.2, 1.0 E.U./dL    Nitrite Urine Negative Negative    Leukocyte Esterase Urine Negative Negative   Urine Microscopic Exam     Status: Abnormal   Result Value Ref Range    Bacteria Urine Few (A) None Seen /HPF    RBC Urine 5-10 (A) 0-2 /HPF /HPF    WBC Urine None Seen 0-5 /HPF /HPF               3/31/23 CT Urogram (reviewed with patient):            IMP:  1. Microhematuria, 1.6 cm left lower pole stone + several small left renal stones  2. Hx cervical ca, presently SURJIT      PLAN:  1. Discussed situation with patient in detail including nature of stone and stone disease. At this point she elects conservative management    2. RTC only PRN    3. Total time spent in reviewing patient records, discussing history, performing exam, discussing diagnosis, outlining treatment plan and documentation: 60 minutes

## 2023-04-23 LAB
PATH REPORT.COMMENTS IMP SPEC: ABNORMAL
PATH REPORT.COMMENTS IMP SPEC: ABNORMAL
PATH REPORT.COMMENTS IMP SPEC: YES
PATH REPORT.FINAL DX SPEC: ABNORMAL
PATH REPORT.GROSS SPEC: ABNORMAL
PATH REPORT.MICROSCOPIC SPEC OTHER STN: ABNORMAL
PATH REPORT.RELEVANT HX SPEC: ABNORMAL

## 2023-04-24 ENCOUNTER — TELEPHONE (OUTPATIENT)
Dept: DERMATOLOGY | Facility: CLINIC | Age: 83
End: 2023-04-24
Payer: COMMERCIAL

## 2023-04-24 NOTE — TELEPHONE ENCOUNTER
----- Message from Rachel Mcdonald PA-C sent at 4/24/2023 10:11 AM CDT -----  Hi Hemalatha,  Your right medial thigh returned as a basal cell carcinoma, recommend further removal with Dr. Mcmillan.

## 2023-04-24 NOTE — TELEPHONE ENCOUNTER
S/w pt and went over Rachel Mcdonadl's message below and scheduled for mohs.  Scheduled Thursday June 29th at 8:15 at .    Mohs letter and information mailed home.    Joanna MURILLO RN  [facility] [place]  [phone]

## 2023-04-24 NOTE — TELEPHONE ENCOUNTER
Left message for pt to call [phone] and ask to speak with derm nurse for Rachel Mcdonald's message below and schedule mohs.    Joanna MURILLO RN  [facility] [place]  [phone]

## 2023-05-26 NOTE — PROGRESS NOTES
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  She is s/p WLE 23 which served as her treatment.  She also has a remote history of cervical cancer treated with radiation in the .  She is here today for a surveillance visit.     Oncology History:  Presented to primary ob/gyn 2022 due to new vulvar lumps  \"She has had a couple of vulvar lumps.  These are on the left side. They started about 3 months ago.  She might have had them longer.  It started getting irritated and increased a little in size.  She used hot packs and they decreased in size a little. She used lotrimin cream and it did not help. She states she had a VD years ago, that her  gave her. She had cervical cancer in the early  and she had radiation therapy. \" Vulvar biopsy was performed.  Pathology:  A.  Vulva, left labia majora, biopsy:  -Basal cell carcinoma, nodular type, present at resection margin.     Hx of cervical cancer, she notes that she was treated with radiation in the  at the [facility]. She did not follow with an oncologist after this, did have pap smears regularly until about 10-15 years ago, doesn't remember any abnormal pap smears since her radiation. Has not had a pelvic exam in some time      22:  Seen in Gyn Onc.  Recommend WLE     23: EUA, WLE left vulvar lesion.  Path showed basal cell carcinoma, nodular and superficial types, margins negative >2mm.            Today she comes to clinic feeling well overall and is without concern.  A couple weeks ago she did have some blood on her pad and underwear.  She is not sure if this is from her incision or her vagina.  No bleeding since.  She does not check her vulva on a regular basis but has not noticed " any new lesions or bumps.  No vulvar irritation or itching.  No changes in bowel or bladder habits.  No nausea or vomiting.  No leg swelling.  No abdominal pain.  No fevers or chills.  No chest pain or shortness of breath.                Review of Systems:    Systemic           no weight changes; no fever; no chills; no night sweats; no appetite changes  Skin           no rashes, or lesions  Eye           no irritation; no changes in vision  Buffy-Laryngeal           no dysphagia; no hoarseness   Pulmonary    no cough; no shortness of breath  Cardiovascular    no chest pain; no palpitations  Gastrointestinal    no diarrhea; no constipation; no abdominal pain; no changes in bowel habits; no blood in stool  Genitourinary   no urinary frequency; no urinary urgency; no dysuria; no pain; +bleeding  Breast   no breast discharge; no breast changes; no breast pain  Musculoskeletal    no myalgias; no arthralgias; no back pain  Psychiatric           no depressed mood; no anxiety    Hematologic   no tender lymph nodes; no noticeable swellings or lumps   Endocrine    no hot flashes; no heat/cold intolerance         Neurological   no tremor; no numbness and tingling; no headaches; no difficulty sleeping      Past Medical History:    Past Medical History:   Diagnosis Date     Actinic keratosis      Anxiety      Basal cell carcinoma pt unsure    abdomen, and elsewhere     Cervical cancer (H) early 1960's    stage 3 txd.  she did not have hysterectomy     Colorectal polyps 01/01/2008    1 Polyp     Gastroesophageal reflux disease without esophagitis      HLD (hyperlipidemia)      Hypertension      Vulvar cancer (H) 11/2022         Past Surgical History:    Past Surgical History:   Procedure Laterality Date     COLONOSCOPY  09/18/2008     ENT SURGERY  07/12/2012    Left lower lip lesion     EXCISE VULVA WIDE LOCAL Left 1/11/2023    Procedure: left pelvic exam  under anesthesia, wide local excision left vulva;  Surgeon: Jenni Melton,  MD;  [place] [facility] [place]         [place] Maintenance Due   Topic Date Due     ZOSTER IMMUNIZATION (2 of 3) 2016     DEXA  2017     COVID-19 Vaccine (4 - Moderna series) 2022     DTAP/TDAP/TD IMMUNIZATION (2 - Td or Tdap) 2022     MEDICARE ANNUAL WELLNESS VISIT  2023       Current Medications:     Current Outpatient Medications   Medication Sig Dispense Refill     ALPRAZolam (XANAX) 0.25 MG tablet TAKE 1 TABLET(0.25 MG) BY MOUTH THREE TIMES DAILY AS NEEDED FOR ANXIETY 30 tablet 0     chlorhexidine (PERIDEX) 0.12 % solution SWISH AND SPIT 15 ML BY MOUTH TWICE DAILY AS NEEDED 473 mL 1     citalopram (CELEXA) 20 MG tablet TAKE 1 TABLET(20 MG) BY MOUTH DAILY 90 tablet 1     lisinopril (ZESTRIL) 10 MG tablet TAKE 1 TABLET(10 MG) BY MOUTH DAILY 90 tablet 0     meclizine (ANTIVERT) 25 MG tablet Take 1 tablet (25 mg) by mouth 3 times daily as needed for dizziness 30 tablet 2     MOTRIN 800 MG OR TABS Take by mouth every 6 hours as needed       multivitamin w/minerals (THERA-VIT-M) tablet Take 1 tablet by mouth daily       omeprazole (PRILOSEC) 20 MG DR capsule Take 20 mg by mouth daily (Patient not taking: Reported on 2023)           Allergies:        Allergies   Allergen Reactions     Amoxicillin Hives     Asa [Aspirin]      Conj Estrog-Medroxyprogest Ace      Evista [Raloxifene Hydrochloride]      Raloxifene Hives and Rash        Social History:     Social History     Tobacco Use     Smoking status: Former     Packs/day: 1.00     Years: 45.00     Pack years: 45.00     Types: Cigarettes     Quit date: 2005     Years since quittin.4     Passive exposure: Past     Smokeless tobacco: Never   Vaping Use     Vaping status: Never Used   Substance Use Topics     Alcohol use: Yes     Comment: socially       History   Drug Use No         Family History:     The patient's family history is notable for:    Family History   Problem Relation Age of Onset     Hypertension Mother      Hypertension  Brother      Cancer Son         hodgkins C.ACAN. No family hx of      Cerebrovascular Disease No family hx of      Anesthesia Reaction No family hx of      Deep Vein Thrombosis (DVT) No family hx of          Physical Exam:     /78 (BP Location: Right arm, Patient Position: Sitting, Cuff Size: Adult Regular)   Pulse 94   Temp 98  F (36.7  C) (Oral)   Resp 16   Wt 68 kg (149 lb 14.4 oz)   LMP  (LMP Unknown)   SpO2 92%   BMI 27.51 kg/m    Body mass index is 27.51 kg/m .    General Appearance: healthy and alert, no distress     HEENT: no palpable nodules or masses        Cardiovascular: regular rate and rhythm, no gallops, rubs or murmurs     Respiratory: lungs clear, no rales, rhonchi or wheezes    Musculoskeletal: extremities non tender and without edema    Skin: no lesions or rashes     Neurological: normal gait, no gross defects     Psychiatric: appropriate mood and affect                               Hematological: normal cervical, supraclavicular and inguinal lymph nodes     Gastrointestinal:       abdomen soft, non-tender, non-distended, no organomegaly or masses    Genitourinary: External genitalia appears consistent with prior surgery and urethral meatus appears normal.  No visible lesion or palpable masses. No aceto-white changes with application of dilute acetic acid.  Vagina is smooth without nodularity or masses.  Radiation changes present with foreshortening to about 5 cm.  Cervix is not easily visualized.  Bimanual exam reveal no masses, nodularity or fullness.  Recto-vaginal exam confirms these findings.        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  She is s/p WLE 1/11/23 which served as her treatment.  She also has a remote history of cervical cancer treated with radiation in the 1960s.  She is here today for a surveillance visit.      20 minutes spent on the date of the encounter doing chart review,  history and exam, documentation, and further activities as noted above.      Plan:     1.) Vulvar cancer:  SURJIT on exam.  No source identified today for her one time episode of bleeding.  RTC in 3 months for next surveillance visit.  Plan for surveillance visit every 3 months for the first 2 years post treatment (1/2025), followed by every 6 months for 3 years thereafter (1/2028), then annually.  Reviewed signs and symptoms for when she should contact the clinic or seek additional care.  Patient to contact the clinic with any questions or concerns in the interim.        Genetic risk factors were assessed and she does not meet qualification for genetic counseling referral.      Labs and/or tests ordered include:  None.     2.) Health maintenance:  Issues addressed today include following up with PCP for annual health maintenance and non-gynecologic issues.       Linda Broussard, DNP, APRN, FNP-C  Nurse Practitioner  Division of Gynecologic Oncology  Pager: [phone]     CC  Patient Care Team:  Javier Jones MD as PCP - General  Javier Jones MD as Assigned PCP  Otoniel Perez MD as Assigned Musculoskeletal Provider  Rachel Mcdonald PA-C as Physician Assistant (Dermatology)  Jenni Melton MD as MD (Gynecologic Oncology)  Steve Martin MD as Assigned OBGYN Provider  Jenni Melton MD as Assigned Cancer Care Provider  Grayson Bonilla MD as MD (Urology)  Grayson Bonilla MD as Assigned Surgical Provider  SELF, REFERRED

## 2023-05-31 ENCOUNTER — ONCOLOGY VISIT (OUTPATIENT)
Dept: ONCOLOGY | Facility: CLINIC | Age: 83
End: 2023-05-31
Attending: OBSTETRICS & GYNECOLOGY
Payer: COMMERCIAL

## 2023-05-31 VITALS
OXYGEN SATURATION: 92 % | DIASTOLIC BLOOD PRESSURE: 78 MMHG | HEART RATE: 94 BPM | TEMPERATURE: 98 F | SYSTOLIC BLOOD PRESSURE: 128 MMHG | BODY MASS INDEX: 27.51 KG/M2 | WEIGHT: 149.9 LBS | RESPIRATION RATE: 16 BRPM

## 2023-05-31 DIAGNOSIS — F41.9 ANXIETY: ICD-10-CM

## 2023-05-31 DIAGNOSIS — C51.9 VULVAR CANCER (H): Primary | ICD-10-CM

## 2023-05-31 DIAGNOSIS — Z08 ENCOUNTER FOR FOLLOW-UP SURVEILLANCE OF VULVAR CANCER: ICD-10-CM

## 2023-05-31 DIAGNOSIS — Z85.44 ENCOUNTER FOR FOLLOW-UP SURVEILLANCE OF VULVAR CANCER: ICD-10-CM

## 2023-05-31 PROCEDURE — 99213 OFFICE O/P EST LOW 20 MIN: CPT | Performed by: NURSE PRACTITIONER

## 2023-05-31 PROCEDURE — G0463 HOSPITAL OUTPT CLINIC VISIT: HCPCS | Performed by: NURSE PRACTITIONER

## 2023-05-31 ASSESSMENT — PAIN SCALES - GENERAL: PAINLEVEL: NO PAIN (0)

## 2023-05-31 NOTE — NURSING NOTE
"Oncology Rooming Note    May 31, 2023 11:37 AM   Hemalatha Handy is a 83 year old female who presents for:    Chief Complaint   Patient presents with     Oncology Clinic Visit     Vulvar CA     Initial Vitals: /78 (BP Location: Right arm, Patient Position: Sitting, Cuff Size: Adult Regular)   Pulse 94   Temp 98  F (36.7  C) (Oral)   Resp 16   Wt 68 kg (149 lb 14.4 oz)   LMP  (LMP Unknown)   SpO2 92%   BMI 27.51 kg/m   Estimated body mass index is 27.51 kg/m  as calculated from the following:    Height as of 3/31/23: 1.572 m (5' 1.89\").    Weight as of this encounter: 68 kg (149 lb 14.4 oz). Body surface area is 1.72 meters squared.  No Pain (0) Comment: Data Unavailable   No LMP recorded (lmp unknown). Patient is postmenopausal.  Allergies reviewed: Yes  Medications reviewed: Yes    Medications: Medication refills not needed today.  Pharmacy name entered into [employer]: [id number] [address] AT [facility] [address]    Clinical concerns: Patient is concerned about a surgical wound.  Linda was NOT notified.      Thiago Broussard              "

## 2023-05-31 NOTE — LETTER
"    2023         RE: Hemalatha Handy  [address]  [facility] [address]        Dear Colleague,    Thank you for referring your patient, Hemalahta Handy, to the [facility]. Please see a copy of my visit note below.    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  She is s/p WLE 23 which served as her treatment.  She also has a remote history of cervical cancer treated with radiation in the .  She is here today for a surveillance visit.     Oncology History:  Presented to primary ob/gyn 2022 due to new vulvar lumps  \"She has had a couple of vulvar lumps.  These are on the left side. They started about 3 months ago.  She might have had them longer.  It started getting irritated and increased a little in size.  She used hot packs and they decreased in size a little. She used lotrimin cream and it did not help. She states she had a VD years ago, that her  gave her. She had cervical cancer in the early  and she had radiation therapy. \" Vulvar biopsy was performed.  Pathology:  A.  Vulva, left labia majora, biopsy:  -Basal cell carcinoma, nodular type, present at resection margin.     Hx of cervical cancer, she notes that she was treated with radiation in the  at the [facility]. She did not follow with an oncologist after this, did have pap smears regularly until about 10-15 years ago, doesn't remember any abnormal pap smears since her radiation. Has not had a pelvic exam in some time      22:  Seen in Gyn Onc.  Recommend WLE     23: EUA, WLE left vulvar lesion.  Path showed basal cell carcinoma, nodular and superficial types, margins negative >2mm.            Today she comes to " clinic feeling well overall and is without concern.  A couple weeks ago she did have some blood on her pad and underwear.  She is not sure if this is from her incision or her vagina.  No bleeding since.  She does not check her vulva on a regular basis but has not noticed any new lesions or bumps.  No vulvar irritation or itching.  No changes in bowel or bladder habits.  No nausea or vomiting.  No leg swelling.  No abdominal pain.  No fevers or chills.  No chest pain or shortness of breath.                Review of Systems:    Systemic           no weight changes; no fever; no chills; no night sweats; no appetite changes  Skin           no rashes, or lesions  Eye           no irritation; no changes in vision  Buffy-Laryngeal           no dysphagia; no hoarseness   Pulmonary    no cough; no shortness of breath  Cardiovascular    no chest pain; no palpitations  Gastrointestinal    no diarrhea; no constipation; no abdominal pain; no changes in bowel habits; no blood in stool  Genitourinary   no urinary frequency; no urinary urgency; no dysuria; no pain; +bleeding  Breast   no breast discharge; no breast changes; no breast pain  Musculoskeletal    no myalgias; no arthralgias; no back pain  Psychiatric           no depressed mood; no anxiety    Hematologic   no tender lymph nodes; no noticeable swellings or lumps   Endocrine    no hot flashes; no heat/cold intolerance         Neurological   no tremor; no numbness and tingling; no headaches; no difficulty sleeping      Past Medical History:    Past Medical History:   Diagnosis Date     Actinic keratosis      Anxiety      Basal cell carcinoma pt unsure    abdomen, and elsewhere     Cervical cancer (H) early 1960's    stage 3 txd.  she did not have hysterectomy     Colorectal polyps 01/01/2008    1 Polyp     Gastroesophageal reflux disease without esophagitis      HLD (hyperlipidemia)      Hypertension      Vulvar cancer (H) 11/2022         Past Surgical History:    Past  Surgical History:   Procedure Laterality Date     COLONOSCOPY  2008     ENT SURGERY  2012    Left lower lip lesion     EXCISE VULVA WIDE LOCAL Left 2023    Procedure: left pelvic exam  under anesthesia, wide local excision left vulva;  Surgeon: Jenni Melton MD;  [place] [facility] [place]         Health Maintenance Due   Topic Date Due     ZOSTER IMMUNIZATION (2 of 3) 2016     DEXA  2017     COVID-19 Vaccine (4 - Moderna series) 2022     DTAP/TDAP/TD IMMUNIZATION (2 - Td or Tdap) 2022     MEDICARE ANNUAL WELLNESS VISIT  2023       Current Medications:     Current Outpatient Medications   Medication Sig Dispense Refill     ALPRAZolam (XANAX) 0.25 MG tablet TAKE 1 TABLET(0.25 MG) BY MOUTH THREE TIMES DAILY AS NEEDED FOR ANXIETY 30 tablet 0     chlorhexidine (PERIDEX) 0.12 % solution SWISH AND SPIT 15 ML BY MOUTH TWICE DAILY AS NEEDED 473 mL 1     citalopram (CELEXA) 20 MG tablet TAKE 1 TABLET(20 MG) BY MOUTH DAILY 90 tablet 1     lisinopril (ZESTRIL) 10 MG tablet TAKE 1 TABLET(10 MG) BY MOUTH DAILY 90 tablet 0     meclizine (ANTIVERT) 25 MG tablet Take 1 tablet (25 mg) by mouth 3 times daily as needed for dizziness 30 tablet 2     MOTRIN 800 MG OR TABS Take by mouth every 6 hours as needed       multivitamin w/minerals (THERA-VIT-M) tablet Take 1 tablet by mouth daily       omeprazole (PRILOSEC) 20 MG DR capsule Take 20 mg by mouth daily (Patient not taking: Reported on 2023)           Allergies:        Allergies   Allergen Reactions     Amoxicillin Hives     Asa [Aspirin]      Conj Estrog-Medroxyprogest Ace      Evista [Raloxifene Hydrochloride]      Raloxifene Hives and Rash        Social History:     Social History     Tobacco Use     Smoking status: Former     Packs/day: 1.00     Years: 45.00     Pack years: 45.00     Types: Cigarettes     Quit date: 2005     Years since quittin.4     Passive exposure: Past     Smokeless tobacco: Never   Vaping Use     Vaping  status: Never Used   Substance Use Topics     Alcohol use: Yes     Comment: socially       History   Drug Use No         Family History:     The patient's family history is notable for:    Family History   Problem Relation Age of Onset     Hypertension Mother      Hypertension Brother      Cancer Son         hodgkins     C.A.D. No family hx of      Cerebrovascular Disease No family hx of      Anesthesia Reaction No family hx of      Deep Vein Thrombosis (DVT) No family hx of          Physical Exam:     /78 (BP Location: Right arm, Patient Position: Sitting, Cuff Size: Adult Regular)   Pulse 94   Temp 98  F (36.7  C) (Oral)   Resp 16   Wt 68 kg (149 lb 14.4 oz)   LMP  (LMP Unknown)   SpO2 92%   BMI 27.51 kg/m    Body mass index is 27.51 kg/m .    General Appearance: healthy and alert, no distress     HEENT: no palpable nodules or masses        Cardiovascular: regular rate and rhythm, no gallops, rubs or murmurs     Respiratory: lungs clear, no rales, rhonchi or wheezes    Musculoskeletal: extremities non tender and without edema    Skin: no lesions or rashes     Neurological: normal gait, no gross defects     Psychiatric: appropriate mood and affect                               Hematological: normal cervical, supraclavicular and inguinal lymph nodes     Gastrointestinal:       abdomen soft, non-tender, non-distended, no organomegaly or masses    Genitourinary: External genitalia appears consistent with prior surgery and urethral meatus appears normal.  No visible lesion or palpable masses. No aceto-white changes with application of dilute acetic acid.  Vagina is smooth without nodularity or masses.  Radiation changes present with foreshortening to about 5 cm.  Cervix is not easily visualized.  Bimanual exam reveal no masses, nodularity or fullness.  Recto-vaginal exam confirms these findings.        Assessment:    Hemalatha Handy is a 83 year old woman with a diagnosis of stage IB basal cell carcinoma  vulvar cancer.  She is s/p WLE 1/11/23 which served as her treatment.  She also has a remote history of cervical cancer treated with radiation in the 1960s.  She is here today for a surveillance visit.      20 minutes spent on the date of the encounter doing chart review, history and exam, documentation, and further activities as noted above.      Plan:     1.) Vulvar cancer:  SURJIT on exam.  No source identified today for her one time episode of bleeding.  RTC in 3 months for next surveillance visit.  Plan for surveillance visit every 3 months for the first 2 years post treatment (1/2025), followed by every 6 months for 3 years thereafter (1/2028), then annually.  Reviewed signs and symptoms for when she should contact the clinic or seek additional care.  Patient to contact the clinic with any questions or concerns in the interim.      Genetic risk factors were assessed and she does not meet qualification for genetic counseling referral.    Labs and/or tests ordered include:  None.     2.) Health maintenance:  Issues addressed today include following up with PCP for annual health maintenance and non-gynecologic issues.       Linda Broussard DNP, APRN, FNP-C  Nurse Practitioner  Division of Gynecologic Oncology  Pager: [phone]     CC  Patient Care Team:  Javier Jones MD as PCP - General  Javier Jones MD as Assigned PCP  Otoniel Perez MD as Assigned Musculoskeletal Provider  Rachel Mcdonald PA-C as Physician Assistant (Dermatology)  Jenni Melton MD as MD (Gynecologic Oncology)  Steve Martin MD as Assigned OBGYN Provider  Jenni Melton MD as Assigned Cancer Care Provider  Grayson Bonilla MD as MD (Urology)  Grayson Bonilla MD as Assigned Surgical Provider  SELF, REFERRED      Again, thank you for allowing me to participate in the care of your patient.        Sincerely,        SATHYA Adams CNP

## 2023-06-01 ENCOUNTER — OFFICE VISIT (OUTPATIENT)
Dept: FAMILY MEDICINE | Facility: CLINIC | Age: 83
End: 2023-06-01
Payer: COMMERCIAL

## 2023-06-01 VITALS
RESPIRATION RATE: 16 BRPM | TEMPERATURE: 97.2 F | HEART RATE: 83 BPM | HEIGHT: 62 IN | WEIGHT: 150.25 LBS | DIASTOLIC BLOOD PRESSURE: 69 MMHG | BODY MASS INDEX: 27.65 KG/M2 | OXYGEN SATURATION: 97 % | SYSTOLIC BLOOD PRESSURE: 126 MMHG

## 2023-06-01 DIAGNOSIS — Z01.818 PREOP GENERAL PHYSICAL EXAM: Primary | ICD-10-CM

## 2023-06-01 DIAGNOSIS — H26.9 CATARACT OF BOTH EYES, UNSPECIFIED CATARACT TYPE: ICD-10-CM

## 2023-06-01 PROCEDURE — 99214 OFFICE O/P EST MOD 30 MIN: CPT | Performed by: FAMILY MEDICINE

## 2023-06-01 ASSESSMENT — PAIN SCALES - GENERAL: PAINLEVEL: NO PAIN (0)

## 2023-06-01 NOTE — PROGRESS NOTES
[facility]  [address] [place] [address]  [place] [address]  Phone: [phone]  Primary Provider: He Kumar  Pre-op Performing Provider: HE KUMAR       PREOPERATIVE EVALUATION:  Today's date: 6/1/2023    Hemalatha Handy is a 83 year old female who presents for a preoperative evaluation.      6/1/2023     9:05 AM   Additional Questions   Roomed by Stephanie Mcfarland     Surgical Information:  Surgery/Procedure: Cataract  Surgery Location: [facility] in [place]  Surgeon: Samson  Surgery Date: 06/20/2023 and July 18th  Time of Surgery: TBD  Where patient plans to recover: At home with family  Fax number for surgical facility: [phone]         Subjective       HPI related to upcoming procedure: 83 year old female with bilateral cataracts.         6/1/2023     8:48 AM   Preop Questions   1. Have you ever had a heart attack or stroke? No   2. Have you ever had surgery on your heart or blood vessels, such as a stent placement, a coronary artery bypass, or surgery on an artery in your head, neck, heart, or legs? No   3. Do you have chest pain with activity? No   4. Do you have a history of  heart failure? No   5. Do you currently have a cold, bronchitis or symptoms of other infection? No   6. Do you have a cough, shortness of breath, or wheezing? No   7. Do you or anyone in your family have previous history of blood clots? No   8. Do you or does anyone in your family have a serious bleeding problem such as prolonged bleeding following surgeries or cuts? No   9. Have you ever had problems with anemia or been told to take iron pills? No   10. Have you had any abnormal blood loss such as black, tarry or bloody stools, or abnormal vaginal bleeding? No   11. Have you ever had a blood transfusion? No   12. Are you willing to have a blood transfusion if it is medically needed before, during, or after your surgery? Yes   13. Have you or any of your relatives ever had problems with  anesthesia? No   14. Do you have sleep apnea, excessive snoring or daytime drowsiness? No   15. Do you have any artifical heart valves or other implanted medical devices like a pacemaker, defibrillator, or continuous glucose monitor? No   16. Do you have artificial joints? No   17. Are you allergic to latex? No       Health Care Directive:  Patient does not have a Health Care Directive or Living Will    Preoperative Review of :  Patient on nightly alprazolam  Chronically            Review of Systems  Constitutional, neuro, ENT, endocrine, pulmonary, cardiac, gastrointestinal, genitourinary, musculoskeletal, integument and psychiatric systems are negative, except as otherwise noted.    No recent illnesses    No history of anesthesia problems    No cardiac history or symptoms    Patient has not needed the motrin/ ibuprofen recently     Patient Active Problem List    Diagnosis Date Noted     Vulvar cancer (H) 12/19/2022     Priority: Medium     Added automatically from request for surgery [id number]       Essential hypertension with goal blood pressure less than 140/90 08/01/2016     Priority: Medium     Lateral meniscus tear, left, initial encounter 07/07/2015     Priority: Medium     Popliteal cyst, left 07/07/2015     Priority: Medium     GERD (gastroesophageal reflux disease) 12/30/2011     Priority: Medium     Advanced directives, counseling/discussion 03/09/2011     Priority: Medium     Advance Care Planning:   ACP Review and Resources Provided:  Reviewed chart for advance care plan.  Hemalatha Handy has no plan or code status on file. Discussed available resources and provided with information. Confirmed code status reflects current choices pending further ACP discussions.  Confirmed/documented designated decision maker(s). See permanent comments section of demographics in clinical tab. Added by Stephanie Mcfarland on 7/16/2013  Patient would like information and told to call to schedule appointment.       Anxiety  2010     Priority: Medium      Past Medical History:   Diagnosis Date     Actinic keratosis      Anxiety      Basal cell carcinoma pt unsure    abdomen, and elsewhere     Cervical cancer (H) early 1960's    stage 3 txd.  she did not have hysterectomy     Colorectal polyps 2008    1 Polyp     Gastroesophageal reflux disease without esophagitis      HLD (hyperlipidemia)      Hypertension      Vulvar cancer (H) 2022     Past Surgical History:   Procedure Laterality Date     COLONOSCOPY  2008     ENT SURGERY  2012    Left lower lip lesion     EXCISE VULVA WIDE LOCAL Left 2023    Procedure: left pelvic exam  under anesthesia, wide local excision left vulva;  Surgeon: Jenni Melton MD;  Location: [facility] OR     Current Outpatient Medications   Medication Sig Dispense Refill     ALPRAZolam (XANAX) 0.25 MG tablet TAKE 1 TABLET(0.25 MG) BY MOUTH THREE TIMES DAILY AS NEEDED FOR ANXIETY 30 tablet 0     chlorhexidine (PERIDEX) 0.12 % solution SWISH AND SPIT 15 ML BY MOUTH TWICE DAILY AS NEEDED 473 mL 1     citalopram (CELEXA) 20 MG tablet TAKE 1 TABLET(20 MG) BY MOUTH DAILY 90 tablet 1     lisinopril (ZESTRIL) 10 MG tablet TAKE 1 TABLET(10 MG) BY MOUTH DAILY 90 tablet 0     meclizine (ANTIVERT) 25 MG tablet Take 1 tablet (25 mg) by mouth 3 times daily as needed for dizziness 30 tablet 2     MOTRIN 800 MG OR TABS Take by mouth every 6 hours as needed       multivitamin w/minerals (THERA-VIT-M) tablet Take 1 tablet by mouth daily       omeprazole (PRILOSEC) 20 MG DR capsule Take 20 mg by mouth daily         Allergies   Allergen Reactions     Amoxicillin Hives     Asa [Aspirin]      Conj Estrog-Medroxyprogest Ace      Evista [Raloxifene Hydrochloride]      Raloxifene Hives and Rash        Social History     Tobacco Use     Smoking status: Former     Packs/day: 1.00     Years: 45.00     Pack years: 45.00     Types: Cigarettes     Quit date: 2005     Years since quittin.4     Passive exposure:  "Past     Smokeless tobacco: Never   Vaping Use     Vaping status: Never Used   Substance Use Topics     Alcohol use: Yes     Comment: socially        History   Drug Use No         Objective     /69 (BP Location: Left arm, Patient Position: Chair, Cuff Size: Adult Regular)   Pulse 83   Temp 97.2  F (36.2  C) (Temporal)   Resp 16   Ht 1.572 m (5' 1.89\")   Wt 68.2 kg (150 lb 4 oz)   LMP  (LMP Unknown)   SpO2 97%   BMI 27.58 kg/m      Physical Exam    GENERAL APPEARANCE: healthy, alert and no distress     EYES: EOMI, PERRL     HENT: ear canals and TM's normal and nose and mouth without ulcers or lesions     NECK: no adenopathy, no asymmetry, masses, or scars and thyroid normal to palpation     RESP: lungs clear to auscultation - no rales, rhonchi or wheezes     CV: regular rates and rhythm, normal S1 S2, no S3 or S4 and no murmur, click or rub     ABDOMEN:  soft, nontender, no HSM or masses and bowel sounds normal     MS: extremities normal- no gross deformities noted, no evidence of inflammation in joints, FROM in all extremities.     SKIN: no suspicious lesions or rashes     NEURO: Normal strength and tone, sensory exam grossly normal, mentation intact and speech normal     PSYCH: mentation appears normal. and affect normal/bright     LYMPHATICS: No cervical adenopathy    Recent Labs   Lab Test 04/03/23  0946 03/31/23  0822 11/15/22  0740 03/24/22  1148   HGB  --  14.0 14.2 13.0   PLT  --  188 211 200   NA  --  142 142 139   POTASSIUM  --  3.9 4.4 3.9   CR 0.7 0.64 0.65 0.67   A1C  --   --  5.7* 5.6        Diagnostics:      No ekg needed for cataract    See labs from March of this year    ASSESSMENT / PLAN:  (Z01.818) Preop general physical exam  (primary encounter diagnosis)  Comment: patient in stable overall health.    Plan: okay for procedure. No cardiac  History/ symptoms.     (H26.9) Cataract of both eyes, unspecified cataract type  Comment: as above   Plan: as above    Patient will hold the " lisinopril the am of procedure    Patient not  Taking ibuprofen currently.       I reviewed the patient's medications, allergies, medical history, family history, and social history.    Javier Jones MD        Revised Cardiac Risk Index (RCRI):  The patient has the following serious cardiovascular risks for perioperative complications:   - No serious cardiac risks = 0 points     RCRI Interpretation: 0 points: Class I (very low risk - 0.4% complication rate)           Signed Electronically by: Javier Jones MD  Copy of this evaluation report is provided to requesting physician.

## 2023-06-01 NOTE — PATIENT INSTRUCTIONS
Hold lisinopril the morning of procedure    No food the am of procedure    Water up until 4 hours prior    No ibuprofen the week prior    Tylenol okay to take

## 2023-06-27 NOTE — PROGRESS NOTES
Surgical Office Location:  [facility]  [address] [place] [id number]

## 2023-06-29 ENCOUNTER — OFFICE VISIT (OUTPATIENT)
Dept: DERMATOLOGY | Facility: CLINIC | Age: 83
End: 2023-06-29
Payer: COMMERCIAL

## 2023-06-29 DIAGNOSIS — C44.712 BASAL CELL CARCINOMA (BCC) OF RIGHT THIGH: Primary | ICD-10-CM

## 2023-06-29 PROCEDURE — 17313 MOHS 1 STAGE T/A/L: CPT | Performed by: DERMATOLOGY

## 2023-06-29 NOTE — LETTER
6/29/2023         RE: Hemalatha Handy  [address]  [facility] [address]        Dear Colleague,    Thank you for referring your patient, Hemalatha Handy, to the [facility]. Please see a copy of my visit note below.    Surgical Office Location:  [facility]  [address] [place] [address]      Hemalatha Handy is an extremely pleasant 83 year old year old female patient here today for evaluation and managment of basal cell carcinoma on right groin.  Patient has no other skin complaints today.  Remainder of the HPI, Meds, PMH, Allergies, FH, and SH was reviewed in chart.      Past Medical History:   Diagnosis Date     Actinic keratosis      Anxiety      Basal cell carcinoma pt unsure    abdomen, and elsewhere     Cervical cancer (H) early 1960's    stage 3 txd.  she did not have hysterectomy     Colorectal polyps 01/01/2008    1 Polyp     Gastroesophageal reflux disease without esophagitis      HLD (hyperlipidemia)      Hypertension      Vulvar cancer (H) 11/2022       Past Surgical History:   Procedure Laterality Date     COLONOSCOPY  09/18/2008     ENT SURGERY  07/12/2012    Left lower lip lesion     EXCISE VULVA WIDE LOCAL Left 1/11/2023    Procedure: left pelvic exam  under anesthesia, wide local excision left vulva;  Surgeon: Jenni Melton MD;  Location: [facility] OR        Family History   Problem Relation Age of Onset     Hypertension Mother      Hypertension Brother      Cancer Son         hodgkins     C.A.D. 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 No   Social History Narrative     Not on file     Social Determinants of Health     Financial Resource Strain: Not on file   Food Insecurity: Not on file   Transportation Needs: Not on file   Physical Activity: Not on file   Stress: Not on file   Social Connections: Not on file   Intimate Partner Violence: Not on file   Housing Stability: Not on file       Outpatient Encounter Medications as of 2023   Medication Sig Dispense Refill     ALPRAZolam (XANAX) 0.25 MG tablet TAKE 1 TABLET(0.25 MG) BY MOUTH THREE TIMES DAILY AS NEEDED FOR ANXIETY 30 tablet 0     chlorhexidine (PERIDEX) 0.12 % solution SWISH AND SPIT 15 ML BY MOUTH TWICE DAILY AS NEEDED 473 mL 1     citalopram (CELEXA) 20 MG tablet TAKE 1 TABLET(20 MG) BY MOUTH DAILY 90 tablet 1     lisinopril (ZESTRIL) 10 MG tablet TAKE 1 TABLET(10 MG) BY MOUTH DAILY 90 tablet 0     meclizine (ANTIVERT) 25 MG tablet Take 1 tablet (25 mg) by mouth 3 times daily as needed for dizziness 30 tablet 2     MOTRIN 800 MG OR TABS Take by mouth every 6 hours as needed       multivitamin w/minerals (THERA-VIT-M) tablet Take 1 tablet by mouth daily       omeprazole (PRILOSEC) 20 MG DR capsule Take 20 mg by mouth daily       No facility-administered encounter medications on file as of 2023.             O:   NAD, WDWN, Alert & Oriented, Mood & Affect wnl, Vitals stable   Here today alone    General appearance normal   Vitals stable   Alert, oriented  and in no acute distress     R groin 5mm pink pearly papule       Eyes: Conjunctivae/lids:Normal     ENT: Lips, buccal mucosa, tongue: normal    MSK:Normal    Cardiovascular: peripheral edema none    Pulm: Breathing Normal    Neuro/Psych: Orientation:Alert and Orientedx3 ; Mood/Affect:normal       A/P:  1. R groin basal cell carcinoma   MOHS:   Location    The rationale for Mohs surgery was discussed with the patient and consent was obtained.  The risks and benefits as well as alternatives to therapy were discussed, in detail.  Specifically, the risks of infection, scarring, bleeding, prolonged wound healing, incomplete removal, allergy to anesthesia, nerve injury and recurrence were addressed.  Indication for Mohs was Location. Prior to the procedure, the treatment site was clearly identified and, if available, confirmed with previous photos and confirmed by the patient   All components of the Universal Protocol/PAUSE rule were completed.  The Mohs surgeon operated in two distinct and integrated capacities as the surgeon and pathologist.      The area was prepped with Betasept.  A rim of normal appearing skin was marked circumferentially around the lesion.  The area was infiltrated with local anesthesia.  The tumor was first debulked to remove all clinically apparent tumor.  An incision following the standard Mohs approach was done and the specimen was oriented,mapped and placed in 1 block(s).  Each specimen was then chromacoded and processed in the Mohs laboratory using standard Mohs technique and submitted for frozen section histology.  Frozen section analysis showed no residual tumor but CLEAR MARGINS.      The tumor was excised using standard Mohs technique in 1 stages(s).  CLEAR MARGINS OBTAINED and Final defect size was 1.1 cm.     We discussed the options for wound management in full with the patient including risks/benefits/ possible outcomes.        REPAIR SECOND INTENT: We discussed the options for wound  management in full with the patient including risks/benefits/possible outcomes. Decision made to allow the wound to heal by second intention. Cautery was used for for hemostasis. EBL minimal; complications none; wound care routine.  The patient was discharged in good condition and will return in one month or prn for wound evaluation.  It was a pleasure speaking to Hemalatha Handy today.  Previous clinic notes and pertinent laboratory tests were reviewed prior to Hemalatha Handy's visit.  Signs and Symptoms of skin cancer discussed with patient.  Patient encouraged to perform monthly skin exams.  UV precautions reviewed with patient.  Risks of non-melanoma skin cancer discussed with patient   Return to clinic 6 months        Again, thank you for allowing me to participate in the care of your patient.        Sincerely,        Shamar Mcmillan MD

## 2023-06-29 NOTE — PATIENT INSTRUCTIONS
Open Wound Care     for ______________        No strenuous activity for 48 hours    Take Tylenol as needed for discomfort.                                                .         Do not drink alcoholic beverages for 48 hours.    Keep the pressure bandage in place for 24 hours. If the bandage becomes blood tinged or loose, reinforce it with gauze and tape.        (Refer to the reverse side of this page for management of bleeding).    Remove bandage in 24 hours and begin wound care as follows:     Clean area with tap water using a Q tip or gauze pad, (shower / bathe normally)  Dry wound with Q tip or gauze pad  Apply Aquaphor, Vaseline, Polysporin or Bacitracin Ointment with a Q tip  Do NOT use Neosporin Ointment *  Cover the wound with a band-aid or nonstick gauze pad and paper tape.  Repeat wound care once a day until wound is completely healed.    It is an old wives tale that a wound heals better when it is exposed to air and allowed to dry out. The wound will heal faster with a better cosmetic result if it is kept moist with ointment and covered with a bandage.  Do not let the wound dry out.      Supplies Needed:                Qtips or gauze pads                Polysporin or Bacitracin Ointment                Bandaids or nonstick gauze pads and paper tape    Wound care kits and brown paper tape are available for purchase at   the pharmacy.    BLEEDING:    Use tightly rolled up gauze or cloth to apply direct pressure over the bandage for 20   minutes.  Reapply pressure for an additional 20 minutes if necessary  Call the office or go to the nearest emergency room if pressure fails to stop the bleeding.  Use additional gauze and tape to maintain pressure once the bleeding has stopped.  Begin wound care 24 hours after surgery as directed.                  WOUND HEALING    One week after surgery a pink / red halo will form around the outside of the wound.   This is new skin.  The center of the wound will appear  yellowish white and produce some drainage.  The pink halo will slowly migrate in toward the center of the wound until the wound is covered with new shiny pink skin.  There will be no more drainage when the wound is completely healed.  It will take six months to one year for the redness to fade.  The scar may be itchy, tight and sensitive to extreme temperatures for a year after the surgery.  Massaging the area several times a day for several minutes after the wound is completely healed will help the scar soften and normalize faster. Begin massage only after healing is complete.      In case of emergency call: Dr Mcmillan: [phone]    [facility]: [phone]    [facility]:[phone] Patient Education       Proper skin care from [facility]:    -Eliminate harsh soaps as they strip the natural oils from the skin, often resulting in dry itchy skin ( i.e. Dial, Zest, French [place])  -Use mild soaps such as Cetaphil or Dove Sensitive Skin in the shower. You do not need to use soap on arms, legs, and trunk every time you shower unless visibly soiled.   -Avoid hot or cold showers.  -After showering, lightly dry off and apply moisturizing within 2-3 minutes. This will help trap moisture in the skin.   -Aggressive use of a moisturizer at least 1-2 times a day to the entire body (including -Vanicream, Cetaphil, Aquaphor or Cerave) and moisturize hands after every washing.  -We recommend using moisturizers that come in a tub that needs to be scooped out, not a pump. This has more of an oil base. It will hold moisture in your skin much better than a water base moisturizer. The above recommended are non-pore clogging.      Wear a sunscreen with at least SPF 30 on your face, ears, neck and V of the chest daily. Wear sunscreen on other areas of the body if those areas are exposed to the sun throughout the day. Sunscreens can contain physical and/or chemical blockers. Physical blockers are less  likely to clog pores, these include zinc oxide and titanium dioxide. Reapply every two hour and after swimming.     Sunscreen examples: https://www.ewg.org/sunscreen/    UV radiation  UVA radiation remains constant throughout the day and throughout the year. It is a longer wavelength than UVB and therefore penetrates deeper into the skin leading to immediate and delayed tanning, photoaging, and skin cancer. 70-80% of UVA and UVB radiation occurs between the hours of 10am-2pm.  UVB radiation  UVB radiation causes the most harmful effects and is more significant during the summer months. However, snow and ice can reflect UVB radiation leading to skin damage during the winter months as well. UVB radiation is responsible for tanning, burning, inflammation, delayed erythema (pinkness), pigmentation (brown spots), and skin cancer.     I recommend self monthly full body exams and yearly full body exams with a dermatology provider. If you develop a new or changing lesion please follow up for examination. Most skin cancers are pink and scaly or pink and pearly. However, we do see blue/brown/black skin cancers.  Consider the ABCDEs of melanoma when giving yourself your monthly full body exam ( don't forget the groin, buttocks, feet, toes, etc). A-asymmetry, B-borders, C-color, D-diameter, E-elevation or evolving. If you see any of these changes please follow up in clinic. If you cannot see your back I recommend purchasing a hand held mirror to use with a larger wall mirror.       Checking for Skin Cancer  You can find cancer early by checking your skin each month. There are 3 kinds of skin cancer. They are melanoma, basal cell carcinoma, and squamous cell carcinoma. Doing monthly skin checks is the best way to find new marks or skin changes. Follow the instructions below for checking your skin.   The ABCDEs of checking moles for melanoma   Check your moles or growths for signs of melanoma using ABCDE:   Asymmetry: the sides  of the mole or growth don t match  Border: the edges are ragged, notched, or blurred  Color: the color within the mole or growth varies  Diameter: the mole or growth is larger than 6 mm (size of a pencil eraser)  Evolving: the size, shape, or color of the mole or growth is changing (evolving is not shown in the images below)    Checking for other types of skin cancer  Basal cell carcinoma or squamous cell carcinoma have symptoms such as:     A spot or mole that looks different from all other marks on your skin  Changes in how an area feels, such as itching, tenderness, or pain  Changes in the skin's surface, such as oozing, bleeding, or scaliness  A sore that does not heal  New swelling or redness beyond the border of a mole    Who s at risk?  Anyone can get skin cancer. But you are at greater risk if you have:   Fair skin, light-colored hair, or light-colored eyes  Many moles or abnormal moles on your skin  A history of sunburns from sunlight or tanning beds  A family history of skin cancer  A history of exposure to radiation or chemicals  A weakened immune system  If you have had skin cancer in the past, you are at risk for recurring skin cancer.   How to check your skin  Do your monthly skin checkups in front of a full-length mirror. Check all parts of your body, including your:   Head (ears, face, neck, and scalp)  Torso (front, back, and sides)  Arms (tops, undersides, upper, and lower armpits)  Hands (palms, backs, and fingers, including under the nails)  Buttocks and genitals  Legs (front, back, and sides)  Feet (tops, soles, toes, including under the nails, and between toes)  If you have a lot of moles, take digital photos of them each month. Make sure to take photos both up close and from a distance. These can help you see if any moles change over time.   Most skin changes are not cancer. But if you see any changes in your skin, call your doctor right away. Only he or she can diagnose a problem. If you have  skin cancer, seeing your doctor can be the first step toward getting the treatment that could save your life.   [employer] last reviewed this educational content on 4/1/2019 2000-2020 [employer]. [address] [place] [address], [place] [address]. All rights reserved. This information is not intended as a substitute for professional medical care. Always follow your healthcare professional's instructions.       When should I call my doctor?  If you are worsening or not improving, please, contact us or seek urgent care as noted below.     Who should I call with questions (adults)?  [facility] (adult and pediatric): [phone]  [facility] (adult): [phone]  [facility] ([facility] and [place]) [phone]  For urgent needs outside of business hours call the [facility] at [phone] and ask for the dermatology resident on call to be paged  If this is a medical emergency and you are unable to reach an ER, Call [phone]      If you need a prescription refill, please contact your pharmacy. Refills are approved or denied by our Physicians during normal business hours, Monday through Fridays  Per office policy, refills will not be granted if you have not been seen within the past year (or sooner depending on your child's condition)

## 2023-06-29 NOTE — PROGRESS NOTES
Hemalatha Handy is an extremely pleasant 83 year old year old female patient here today for evaluation and managment of basal cell carcinoma on right groin.  Patient has no other skin complaints today.  Remainder of the HPI, Meds, PMH, Allergies, FH, and SH was reviewed in chart.      Past Medical History:   Diagnosis Date     Actinic keratosis      Anxiety      Basal cell carcinoma pt unsure    abdomen, and elsewhere     Cervical cancer (H) early 's    stage 3 txd.  she did not have hysterectomy     Colorectal polyps 2008    1 Polyp     Gastroesophageal reflux disease without esophagitis      HLD (hyperlipidemia)      Hypertension      Vulvar cancer (H) 2022       Past Surgical History:   Procedure Laterality Date     COLONOSCOPY  2008     ENT SURGERY  2012    Left lower lip lesion     EXCISE VULVA WIDE LOCAL Left 2023    Procedure: left pelvic exam  under anesthesia, wide local excision left vulva;  Surgeon: Jenni Melton MD;  Location: [facility] OR        Family History   Problem Relation Age of Onset     Hypertension Mother      Hypertension Brother      Cancer Son         hodgkins     C.A.D. 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 No   Social History Narrative     Not on file     Social Determinants of Health     Financial Resource Strain: Not on file   Food Insecurity: Not on file   Transportation Needs: Not on file   Physical Activity: Not on file   Stress: Not on file   Social Connections: Not on file   Intimate Partner Violence: Not on file   Housing Stability: Not on file       Outpatient Encounter Medications as of 6/29/2023   Medication Sig Dispense Refill     ALPRAZolam (XANAX) 0.25 MG tablet TAKE 1 TABLET(0.25 MG) BY MOUTH THREE TIMES DAILY AS NEEDED FOR ANXIETY 30 tablet 0     chlorhexidine (PERIDEX) 0.12 % solution SWISH AND SPIT 15 ML BY MOUTH TWICE DAILY AS NEEDED 473 mL 1     citalopram (CELEXA) 20 MG tablet TAKE 1 TABLET(20 MG) BY MOUTH DAILY 90 tablet 1     lisinopril (ZESTRIL) 10 MG tablet TAKE 1 TABLET(10 MG) BY MOUTH DAILY 90 tablet 0     meclizine (ANTIVERT) 25 MG tablet Take 1 tablet (25 mg) by mouth 3 times daily as needed for dizziness 30 tablet 2     MOTRIN 800 MG OR TABS Take by mouth every 6 hours as needed       multivitamin w/minerals (THERA-VIT-M) tablet Take 1 tablet by mouth daily       omeprazole (PRILOSEC) 20 MG DR capsule Take 20 mg by mouth daily       No facility-administered encounter medications on file as of 6/29/2023.             O:   NAD, WDWN, Alert & Oriented, Mood & Affect wnl, Vitals stable   Here today alone    General appearance normal   Vitals stable   Alert, oriented and in no acute distress     R groin 5mm pink pearly papule       Eyes: Conjunctivae/lids:Normal     ENT: Lips, buccal mucosa, tongue: normal    MSK:Normal    Cardiovascular: peripheral edema none    Pulm: Breathing Normal    Neuro/Psych: Orientation:Alert and Orientedx3 ; Mood/Affect:normal       A/P:  1. R groin basal cell carcinoma   MOHS:   Location    The rationale for  Mohs surgery was discussed with the patient and consent was obtained.  The risks and benefits as well as alternatives to therapy were discussed, in detail.  Specifically, the risks of infection, scarring, bleeding, prolonged wound healing, incomplete removal, allergy to anesthesia, nerve injury and recurrence were addressed.  Indication for Mohs was Location. Prior to the procedure, the treatment site was clearly identified and, if available, confirmed with previous photos and confirmed by the patient   All components of the Universal Protocol/PAUSE rule were completed.  The Mohs surgeon operated in two distinct and integrated capacities as the surgeon and pathologist.      The area was prepped with Betasept.  A rim of normal appearing skin was marked circumferentially around the lesion.  The area was infiltrated with local anesthesia.  The tumor was first debulked to remove all clinically apparent tumor.  An incision following the standard Mohs approach was done and the specimen was oriented,mapped and placed in 1 block(s).  Each specimen was then chromacoded and processed in the Mohs laboratory using standard Mohs technique and submitted for frozen section histology.  Frozen section analysis showed no residual tumor but CLEAR MARGINS.      The tumor was excised using standard Mohs technique in 1 stages(s).  CLEAR MARGINS OBTAINED and Final defect size was 1.1 cm.     We discussed the options for wound management in full with the patient including risks/benefits/ possible outcomes.        REPAIR SECOND INTENT: We discussed the options for wound management in full with the patient including risks/benefits/possible outcomes. Decision made to allow the wound to heal by second intention. Cautery was used for for hemostasis. EBL minimal; complications none; wound care routine.  The patient was discharged in good condition and will return in one month or prn for wound evaluation.  It was a pleasure speaking to Hemalatha MCINTOSH  Rozina today.  Previous clinic notes and pertinent laboratory tests were reviewed prior to Hemalatha Handy's visit.  Signs and Symptoms of skin cancer discussed with patient.  Patient encouraged to perform monthly skin exams.  UV precautions reviewed with patient.  Risks of non-melanoma skin cancer discussed with patient   Return to clinic 6 months

## 2023-07-17 ENCOUNTER — OFFICE VISIT (OUTPATIENT)
Dept: FAMILY MEDICINE | Facility: CLINIC | Age: 83
End: 2023-07-17
Payer: COMMERCIAL

## 2023-07-17 VITALS
HEIGHT: 62 IN | DIASTOLIC BLOOD PRESSURE: 85 MMHG | RESPIRATION RATE: 16 BRPM | BODY MASS INDEX: 27.81 KG/M2 | HEART RATE: 90 BPM | TEMPERATURE: 98.1 F | OXYGEN SATURATION: 97 % | SYSTOLIC BLOOD PRESSURE: 170 MMHG | WEIGHT: 151.13 LBS

## 2023-07-17 DIAGNOSIS — E78.5 HYPERLIPIDEMIA LDL GOAL <100: Primary | ICD-10-CM

## 2023-07-17 DIAGNOSIS — F41.9 ANXIETY: ICD-10-CM

## 2023-07-17 DIAGNOSIS — E55.9 VITAMIN D DEFICIENCY DISEASE: ICD-10-CM

## 2023-07-17 DIAGNOSIS — I10 ESSENTIAL HYPERTENSION WITH GOAL BLOOD PRESSURE LESS THAN 140/90: ICD-10-CM

## 2023-07-17 DIAGNOSIS — R91.1 LUNG NODULE: ICD-10-CM

## 2023-07-17 DIAGNOSIS — R73.01 IMPAIRED FASTING GLUCOSE: ICD-10-CM

## 2023-07-17 DIAGNOSIS — R53.83 FATIGUE, UNSPECIFIED TYPE: ICD-10-CM

## 2023-07-17 LAB
ALBUMIN SERPL BCG-MCNC: 4.5 G/DL (ref 3.5–5.2)
ALP SERPL-CCNC: 87 U/L (ref 35–104)
ALT SERPL W P-5'-P-CCNC: 16 U/L (ref 0–50)
ANION GAP SERPL CALCULATED.3IONS-SCNC: 12 MMOL/L (ref 7–15)
AST SERPL W P-5'-P-CCNC: 26 U/L (ref 0–45)
BASOPHILS # BLD AUTO: 0 10E3/UL (ref 0–0.2)
BASOPHILS NFR BLD AUTO: 0 %
BILIRUB SERPL-MCNC: 0.5 MG/DL
BUN SERPL-MCNC: 15.5 MG/DL (ref 8–23)
CALCIUM SERPL-MCNC: 9.4 MG/DL (ref 8.8–10.2)
CHLORIDE SERPL-SCNC: 106 MMOL/L (ref 98–107)
CHOLEST SERPL-MCNC: 195 MG/DL
CREAT SERPL-MCNC: 0.69 MG/DL (ref 0.51–0.95)
DEPRECATED CALCIDIOL+CALCIFEROL SERPL-MC: 29 UG/L (ref 20–75)
DEPRECATED HCO3 PLAS-SCNC: 24 MMOL/L (ref 22–29)
EOSINOPHIL # BLD AUTO: 0.1 10E3/UL (ref 0–0.7)
EOSINOPHIL NFR BLD AUTO: 3 %
ERYTHROCYTE [DISTWIDTH] IN BLOOD BY AUTOMATED COUNT: 13 % (ref 10–15)
GFR SERPL CREATININE-BSD FRML MDRD: 86 ML/MIN/1.73M2
GLUCOSE SERPL-MCNC: 107 MG/DL (ref 70–99)
HBA1C MFR BLD: 5.9 % (ref 0–5.6)
HCT VFR BLD AUTO: 44.3 % (ref 35–47)
HDLC SERPL-MCNC: 57 MG/DL
HGB BLD-MCNC: 14.6 G/DL (ref 11.7–15.7)
IMM GRANULOCYTES # BLD: 0 10E3/UL
IMM GRANULOCYTES NFR BLD: 0 %
LDLC SERPL CALC-MCNC: 111 MG/DL
LYMPHOCYTES # BLD AUTO: 1.3 10E3/UL (ref 0.8–5.3)
LYMPHOCYTES NFR BLD AUTO: 26 %
MCH RBC QN AUTO: 31.9 PG (ref 26.5–33)
MCHC RBC AUTO-ENTMCNC: 33 G/DL (ref 31.5–36.5)
MCV RBC AUTO: 97 FL (ref 78–100)
MONOCYTES # BLD AUTO: 0.5 10E3/UL (ref 0–1.3)
MONOCYTES NFR BLD AUTO: 10 %
NEUTROPHILS # BLD AUTO: 3 10E3/UL (ref 1.6–8.3)
NEUTROPHILS NFR BLD AUTO: 60 %
NONHDLC SERPL-MCNC: 138 MG/DL
PLATELET # BLD AUTO: 216 10E3/UL (ref 150–450)
POTASSIUM SERPL-SCNC: 4.3 MMOL/L (ref 3.4–5.3)
PROT SERPL-MCNC: 7 G/DL (ref 6.4–8.3)
RBC # BLD AUTO: 4.57 10E6/UL (ref 3.8–5.2)
SODIUM SERPL-SCNC: 142 MMOL/L (ref 136–145)
TRIGL SERPL-MCNC: 136 MG/DL
TSH SERPL DL<=0.005 MIU/L-ACNC: 0.59 UIU/ML (ref 0.3–4.2)
WBC # BLD AUTO: 4.9 10E3/UL (ref 4–11)

## 2023-07-17 PROCEDURE — 80053 COMPREHEN METABOLIC PANEL: CPT | Performed by: FAMILY MEDICINE

## 2023-07-17 PROCEDURE — 85025 COMPLETE CBC W/AUTO DIFF WBC: CPT | Performed by: FAMILY MEDICINE

## 2023-07-17 PROCEDURE — 84443 ASSAY THYROID STIM HORMONE: CPT | Performed by: FAMILY MEDICINE

## 2023-07-17 PROCEDURE — 99214 OFFICE O/P EST MOD 30 MIN: CPT | Performed by: FAMILY MEDICINE

## 2023-07-17 PROCEDURE — 83036 HEMOGLOBIN GLYCOSYLATED A1C: CPT | Performed by: FAMILY MEDICINE

## 2023-07-17 PROCEDURE — 36415 COLL VENOUS BLD VENIPUNCTURE: CPT | Performed by: FAMILY MEDICINE

## 2023-07-17 PROCEDURE — 82306 VITAMIN D 25 HYDROXY: CPT | Performed by: FAMILY MEDICINE

## 2023-07-17 PROCEDURE — 80061 LIPID PANEL: CPT | Performed by: FAMILY MEDICINE

## 2023-07-17 RX ORDER — ALPRAZOLAM 0.25 MG
0.25 TABLET ORAL 3 TIMES DAILY PRN
Qty: 30 TABLET | Refills: 0 | Status: SHIPPED | OUTPATIENT
Start: 2023-07-17 | End: 2023-08-14

## 2023-07-17 RX ORDER — LISINOPRIL 20 MG/1
20 TABLET ORAL DAILY
Qty: 90 TABLET | Refills: 1 | Status: SHIPPED | OUTPATIENT
Start: 2023-07-17 | End: 2023-11-28

## 2023-07-17 ASSESSMENT — PAIN SCALES - GENERAL: PAINLEVEL: NO PAIN (0)

## 2023-07-17 NOTE — LETTER
"July 18, 2023    Hemalatha Handy  [address]   [address]  [facility] [address]          Dear ,    We are writing to inform you of your test results.    The hemoglobin a1c is still in the \"prediabetes\" range.  No medication needed; just keep working on healthy diet/exercise and weight loss as you are able.     Cholesterol better than last time.     Other labs are okay.     Resulted Orders   Comprehensive metabolic panel   Result Value Ref Range    Sodium 142 136 - 145 mmol/L    Potassium 4.3 3.4 - 5.3 mmol/L    Chloride 106 98 - 107 mmol/L    Carbon Dioxide (CO2) 24 22 - 29 mmol/L    Anion Gap 12 7 - 15 mmol/L    Urea Nitrogen 15.5 8.0 - 23.0 mg/dL    Creatinine 0.69 0.51 - 0.95 mg/dL    Calcium 9.4 8.8 - 10.2 mg/dL    Glucose 107 (H) 70 - 99 mg/dL    Alkaline Phosphatase 87 35 - 104 U/L    AST 26 0 - 45 U/L      Comment:      Reference intervals for this test were updated on 6/12/2023 to more accurately reflect our healthy population. There may be differences in the flagging of prior results with similar values performed with this method. Interpretation of those prior results can be made in the context of the updated reference intervals.    ALT 16 0 - 50 U/L      Comment:      Reference intervals for this test were updated on 6/12/2023 to more accurately reflect our healthy population. There may be differences in the flagging of prior results with similar values performed with this method. Interpretation of those prior results can be made in the context of the updated reference intervals.      Protein Total 7.0 6.4 - 8.3 g/dL    Albumin 4.5 3.5 - 5.2 g/dL    Bilirubin Total 0.5 <=1.2 mg/dL    GFR Estimate 86 >60 mL/min/1.73m2   Hemoglobin A1c   Result Value Ref Range    Hemoglobin A1C 5.9 (H) 0.0 - 5.6 %      Comment:      Normal <5.7%   Prediabetes 5.7-6.4%    Diabetes 6.5% or higher     Note: Adopted from ADA consensus guidelines.   TSH with free T4 reflex   Result Value Ref Range    TSH " 0.59 0.30 - 4.20 uIU/mL   Lipid panel reflex to direct LDL Non-fasting   Result Value Ref Range    Cholesterol 195 <200 mg/dL    Triglycerides 136 <150 mg/dL    Direct Measure HDL 57 >=50 mg/dL    LDL Cholesterol Calculated 111 (H) <=100 mg/dL    Non HDL Cholesterol 138 (H) <130 mg/dL    Narrative    Cholesterol  Desirable:  <200 mg/dL    Triglycerides  Normal:  Less than 150 mg/dL  Borderline High:  150-199 mg/dL  High:  200-499 mg/dL  Very High:  Greater than or equal to 500 mg/dL    Direct Measure HDL  Female:  Greater than or equal to 50 mg/dL   Male:  Greater than or equal to 40 mg/dL    LDL Cholesterol  Desirable:  <100mg/dL  Above Desirable:  100-129 mg/dL   Borderline High:  130-159 mg/dL   High:  160-189 mg/dL   Very High:  >= 190 mg/dL    Non HDL Cholesterol  Desirable:  130 mg/dL  Above Desirable:  130-159 mg/dL  Borderline High:  160-189 mg/dL  High:  190-219 mg/dL  Very High:  Greater than or equal to 220 mg/dL   Vitamin D Deficiency   Result Value Ref Range    Vitamin D, Total (25-Hydroxy) 29 20 - 75 ug/L    Narrative    Season, race, dietary intake, and treatment affect the concentration of 25-hydroxy-Vitamin D. Values may decrease during winter months and increase during summer months. Values 20-29 ug/L may indicate Vitamin D insufficiency and values <20 ug/L may indicate Vitamin D deficiency.    Vitamin D determination is routinely performed by an immunoassay specific for 25 hydroxyvitamin D3.  If an individual is on vitamin D2(ergocalciferol) supplementation, please specify 25 OH vitamin D2 and D3 level determination by LCMSMS test VITD23.     CBC with platelets and differential   Result Value Ref Range    WBC Count 4.9 4.0 - 11.0 10e3/uL    RBC Count 4.57 3.80 - 5.20 10e6/uL    Hemoglobin 14.6 11.7 - 15.7 g/dL    Hematocrit 44.3 35.0 - 47.0 %    MCV 97 78 - 100 fL    MCH 31.9 26.5 - 33.0 pg    MCHC 33.0 31.5 - 36.5 g/dL    RDW 13.0 10.0 - 15.0 %    Platelet Count 216 150 - 450 10e3/uL    %  Neutrophils 60 %    % Lymphocytes 26 %    % Monocytes 10 %    % Eosinophils 3 %    % Basophils 0 %    % Immature Granulocytes 0 %    Absolute Neutrophils 3.0 1.6 - 8.3 10e3/uL    Absolute Lymphocytes 1.3 0.8 - 5.3 10e3/uL    Absolute Monocytes 0.5 0.0 - 1.3 10e3/uL    Absolute Eosinophils 0.1 0.0 - 0.7 10e3/uL    Absolute Basophils 0.0 0.0 - 0.2 10e3/uL    Absolute Immature Granulocytes 0.0 <=0.4 10e3/uL       If you have any questions or concerns, please call the clinic at the number listed above.       Sincerely,      Javier Jones MD

## 2023-07-17 NOTE — PROGRESS NOTES
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  Physical Exam  Constitutional:       Appearance: She is well-developed.   HENT:      Head: Normocephalic and atraumatic.   Eyes:      Conjunctiva/sclera: Conjunctivae normal.   Neck:      Vascular: No carotid bruit.   Cardiovascular:      Rate and Rhythm: Normal rate and regular rhythm.      Heart sounds: Normal heart sounds.   Pulmonary:      Effort: Pulmonary effort is normal. No respiratory distress.      Breath sounds: Normal breath sounds.   Neurological:      Mental Status: She is alert and oriented to person, place, and time.         no edema     Radials symmetric     ASSESSMENT / PLAN:  (E78.5) Hyperlipidemia LDL goal <100  (primary encounter diagnosis)  Comment: check labs  Plan: Lipid panel reflex to direct LDL Non-fasting             (R53.83) Fatigue, unspecified type  Comment: check labs   Plan: CBC with Platelets & Differential,         Comprehensive metabolic panel, TSH with free T4        reflex             (R73.01) Impaired fasting glucose  Comment: check   Plan: " Hemoglobin A1c             (F41.9) Anxiety  Comment: refill med.  This is chronic med for patient   Plan: ALPRAZolam (XANAX) 0.25 MG tablet             (I10) Essential hypertension with goal blood pressure less than 140/90  Comment: blood pressure high but did not take blood pressure this am.  Increase dose to 20 mg and see us in a few months   Plan: lisinopril (ZESTRIL) 20 MG tablet             (E55.9) Vitamin D deficiency disease  Comment: check   Plan: Vitamin D Deficiency         On over the counter vit D     (R91.1) Lung nodule  Comment:    Plan: of note, discussed in detail with patient.  Offered CT chest to clarify the nodule seen on ct abd.  However, she declined.  She is quite sure she would not want any new cancer evaluated and treated.      I reviewed the patient's medications, allergies, medical history, family history, and social history.    Javier Jones MD

## 2023-07-17 NOTE — PATIENT INSTRUCTIONS
Increase lisinopril to 20 mg daily    Keep working on healthy diet/exercise     Other meds as is    Monitor sweats and other symptoms    If you decide you want CT scan, let us know

## 2023-08-29 NOTE — PROGRESS NOTES
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  She is s/p WLE 23 which served as her treatment.  She also has a remote history of cervical cancer treated with radiation in the .  She is here today for a surveillance visit.      Oncology History:  Presented to primary ob/gyn 2022 due to new vulvar lumps  \"She has had a couple of vulvar lumps.  These are on the left side. They started about 3 months ago.  She might have had them longer.  It started getting irritated and increased a little in size.  She used hot packs and they decreased in size a little. She used lotrimin cream and it did not help. She states she had a VD years ago, that her  gave her. She had cervical cancer in the early  and she had radiation therapy. \" Vulvar biopsy was performed.  Pathology:  A.  Vulva, left labia majora, biopsy:  -Basal cell carcinoma, nodular type, present at resection margin.     Hx of cervical cancer, she notes that she was treated with radiation in the  at the [facility]. She did not follow with an oncologist after this, did have pap smears regularly until about 10-15 years ago, doesn't remember any abnormal pap smears since her radiation. Has not had a pelvic exam in some time      22:  Seen in Gyn Onc.  Recommend WLE     23: EUA, WLE left vulvar lesion.  Path showed basal cell carcinoma, nodular and superficial types, margins negative >2mm.    3/31/23: CT AP  IMPRESSION:   1.  No acute findings to explain the patient's symptoms. No  inflammatory process, bowel obstruction or hydronephrosis.  2.  Severe central spinal canal stenosis at L5-S1 secondary to a  diffuse disc bulge and ligamentum flavum hypertrophy.  3.  Multiple nonobstructing " left renal calculi including a dominant  lower pole 1.6 cm staghorn calculus.  4.  Mild colonic diverticulosis without evidence of acute  diverticulitis. Mild colonic stool burden.  5.  Left vulvar post surgical changes. No convincing CT evidence of  residual or recurrent tumor. Follow-up with pelvic MRI may be helpful  as clinically warranted.  6.  Incidental small medial right groin subcutaneous cyst, likely an  epidermal inclusion cyst.  7.  Right lower lobe 5 mm pulmonary nodule. Given the patient's  history of vulvar cancer, recommend 3 month chest CT follow-up.  8.  Indeterminant right adrenal gland nodule. This is favored to  represent an adenoma. However, given the patient's cancer history  recommend correlation with any previous outside facility imaging or  nonemergent follow-up with adrenal CT.    Decline follow up on pulmonary nodule.            Today she comes to clinic feeling well overall and is without concern.  She denies any vaginal bleeding, no changes in her bowel or bladder habits, no nausea/emesis, no lower extremity edema, and no difficulties eating or sleeping. She denies any abdominal discomfort/bloating, no fevers or chills, and no chest pain or shortness of breath. She checks her vulva on a regular basis when bathing and hasn't noted any new lesions or bumps.  She denies vulvar irritation or itching.  She is current with her annual physical.  She no longer gets screening mammograms or colonoscopies.  She wonders if she needs to come in every 3 months and if she could just come every 6 months.                Health Maintenance  Colonoscopy: 6/20/14, no more colonoscopies  Mammogram: no more mammograms  Annual physical: 7/17/23  Dexa: 5/8/14      Review of Systems:    Systemic           no weight changes; no fever; no chills; no night sweats; no appetite changes  Skin           no rashes, or lesions  Eye           no irritation; no changes in vision  Buffy-Laryngeal           no dysphagia; no  hoarseness   Pulmonary    no cough; no shortness of breath  Cardiovascular    no chest pain; no palpitations  Gastrointestinal    no diarrhea; no constipation; no abdominal pain; no changes in bowel habits; no blood in stool  Genitourinary   no urinary frequency; no urinary urgency; no dysuria; no pain; no abnormal vaginal discharge; no abnormal vaginal bleeding  Breast   no breast discharge; no breast changes; no breast pain  Musculoskeletal    no myalgias; no arthralgias; no back pain  Psychiatric           no depressed mood; no anxiety    Hematologic   no tender lymph nodes; no noticeable swellings or lumps   Endocrine    no hot flashes; no heat/cold intolerance         Neurological   no tremor; no numbness and tingling; no headaches; no difficulty sleeping      Past Medical History:    Past Medical History:   Diagnosis Date    Actinic keratosis     Anxiety     Basal cell carcinoma pt unsure    abdomen, and elsewhere    Cervical cancer (H) early 1960's    stage 3 txd.  she did not have hysterectomy    Colorectal polyps 01/01/2008    1 Polyp    Gastroesophageal reflux disease without esophagitis     HLD (hyperlipidemia)     Hypertension     Vulvar cancer (H) 11/2022         Past Surgical History:    Past Surgical History:   Procedure Laterality Date    COLONOSCOPY  09/18/2008    ENT SURGERY  07/12/2012    Left lower lip lesion    EXCISE VULVA WIDE LOCAL Left 1/11/2023    Procedure: left pelvic exam  under anesthesia, wide local excision left vulva;  Surgeon: Jenni Melton MD;  Location: [facility] OR         [facility] Due   Topic Date Due    ZOSTER IMMUNIZATION (2 of 3) 05/13/2016    DEXA  05/08/2017    COVID-19 Vaccine (4 - Moderna series) 02/07/2022    DTAP/TDAP/TD IMMUNIZATION (2 - Td or Tdap) 08/01/2022    MEDICARE ANNUAL WELLNESS VISIT  03/08/2023       Current Medications:     Current Outpatient Medications   Medication Sig Dispense Refill    ALPRAZolam (XANAX) 0.25 MG tablet TAKE 1 TABLET(0.25 MG) BY  "MOUTH THREE TIMES DAILY AS NEEDED FOR ANXIETY 30 tablet 0    chlorhexidine (PERIDEX) 0.12 % solution SWISH AND SPIT 15 ML BY MOUTH TWICE DAILY AS NEEDED 473 mL 1    citalopram (CELEXA) 20 MG tablet TAKE 1 TABLET(20 MG) BY MOUTH DAILY 90 tablet 1    lisinopril (ZESTRIL) 20 MG tablet Take 1 tablet (20 mg) by mouth daily 90 tablet 1    meclizine (ANTIVERT) 25 MG tablet Take 1 tablet (25 mg) by mouth 3 times daily as needed for dizziness 30 tablet 2    MOTRIN 800 MG OR TABS Take by mouth every 6 hours as needed      multivitamin w/minerals (THERA-VIT-M) tablet Take 1 tablet by mouth daily      omeprazole (PRILOSEC) 20 MG DR capsule Take 20 mg by mouth daily           Allergies:        Allergies   Allergen Reactions    Amoxicillin Hives    Asa [Aspirin]     Conj Estrog-Medroxyprogest Ace     Evista [Raloxifene Hydrochloride]     Raloxifene Hives and Rash        Social History:     Social History     Tobacco Use    Smoking status: Former     Packs/day: 1.00     Years: 45.00     Pack years: 45.00     Types: Cigarettes     Quit date: 2005     Years since quittin.6     Passive exposure: Past    Smokeless tobacco: Never   Substance Use Topics    Alcohol use: Yes     Comment: socially       History   Drug Use No         Family History:     The patient's family history is notable for:    Family History   Problem Relation Age of Onset    Hypertension Mother     Hypertension Brother     Cancer Son         hodgkins    C.A.D. No family hx of     Cerebrovascular Disease No family hx of     Anesthesia Reaction No family hx of     Deep Vein Thrombosis (DVT) No family hx of          Physical Exam:     /58 (BP Location: Right arm, Patient Position: Chair, Cuff Size: Adult Large)   Pulse 75   Temp 97.9  F (36.6  C)   Resp 16   Ht 1.572 m (5' 1.89\")   Wt 68.4 kg (150 lb 11.2 oz)   LMP  (LMP Unknown)   SpO2 96%   BMI 27.66 kg/m    Body mass index is 27.66 kg/m .    General Appearance: healthy and alert, no distress   "   HEENT: no palpable nodules or masses        Cardiovascular: regular rate and rhythm, no gallops, rubs or murmurs     Respiratory: lungs clear, no rales, rhonchi or wheezes    Musculoskeletal: extremities non tender and without edema    Skin: no lesions or rashes     Neurological: normal gait, no gross defects     Psychiatric: appropriate mood and affect                               Hematological: normal cervical, supraclavicular and inguinal lymph nodes     Gastrointestinal:       abdomen soft, non-tender, non-distended, no organomegaly or masses    Genitourinary: External genitalia appears consistent with prior surgery and urethral meatus appears normal.  No visible lesion or palpable masses. No aceto-white changes with application of dilute acetic acid.      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  She is s/p WLE 1/11/23 which served as her treatment.  She also has a remote history of cervical cancer treated with radiation in the 1960s.  She is here today for a surveillance visit.    20 minutes spent on the date of the encounter doing chart review, history and exam, documentation, and further activities as noted above.      Plan:     1.)        Vulvar cancer:  SURJIT on exam.  RTC in 3 months for next surveillance visit.  Plan for surveillance visit every 3 months for the first 2 years post treatment (1/2025), followed by every 6 months for 3 years thereafter (1/2028), then annually.  Discussed that the recommendation is to be seen every 3 months for 2 years because that is the most likely time for recurrence.  She does endorse that if her cancer came back she would consider surgical resection.  She is amenable to coming for an exam in 3 months for her next visit.  Reviewed signs and symptoms for when she should contact the clinic or seek additional care.  Patient to contact the clinic with any questions or concerns in the interim.       Genetic risk factors were assessed and she does not meet qualification for genetic counseling referral.     Labs and/or tests ordered include:  None.                2.)        Health maintenance:  Issues addressed today include following up with PCP for annual health maintenance and non-gynecologic issues.     3.) Pulmonary nodule: 5 mm pulmonary nodule identified in RLL on CT AP 3/31/23. Discussed recommendations to follow this for 2 years to establish stability and verify that it is a benign lesion.  She declines this recommendation.    Linda Broussard, DNP, APRN, FNP-C  Nurse Practitioner  Division of Gynecologic Oncology  Pager: [phone]     CC  Patient Care Team:  Javier Jones MD as PCP - General  Javier Jones MD as Assigned PCP  Otoniel Perez MD as Assigned Musculoskeletal Provider  Rachel Mcdonald PA-C as Physician Assistant (Dermatology)  Jenni Melton MD as MD (Gynecologic Oncology)  Steve Martin MD as Assigned OBGYN Provider  Jenni Melton MD as Assigned Cancer Care Provider  Grayson Bonilla MD as MD (Urology)  Shamar Mcmillan MD as Assigned Surgical Provider  SELF, REFERRED

## 2023-08-30 ENCOUNTER — ONCOLOGY VISIT (OUTPATIENT)
Dept: ONCOLOGY | Facility: CLINIC | Age: 83
End: 2023-08-30
Attending: NURSE PRACTITIONER
Payer: COMMERCIAL

## 2023-08-30 VITALS
WEIGHT: 150.7 LBS | HEART RATE: 75 BPM | TEMPERATURE: 97.9 F | HEIGHT: 62 IN | SYSTOLIC BLOOD PRESSURE: 134 MMHG | OXYGEN SATURATION: 96 % | BODY MASS INDEX: 27.73 KG/M2 | RESPIRATION RATE: 16 BRPM | DIASTOLIC BLOOD PRESSURE: 58 MMHG

## 2023-08-30 DIAGNOSIS — Z08 ENCOUNTER FOR FOLLOW-UP SURVEILLANCE OF VULVAR CANCER: ICD-10-CM

## 2023-08-30 DIAGNOSIS — Z85.44 ENCOUNTER FOR FOLLOW-UP SURVEILLANCE OF VULVAR CANCER: ICD-10-CM

## 2023-08-30 DIAGNOSIS — C51.9 VULVAR CANCER (H): Primary | ICD-10-CM

## 2023-08-30 PROCEDURE — G0463 HOSPITAL OUTPT CLINIC VISIT: HCPCS | Performed by: NURSE PRACTITIONER

## 2023-08-30 PROCEDURE — 99213 OFFICE O/P EST LOW 20 MIN: CPT | Performed by: NURSE PRACTITIONER

## 2023-08-30 ASSESSMENT — PAIN SCALES - GENERAL: PAINLEVEL: NO PAIN (0)

## 2023-08-30 NOTE — NURSING NOTE
"Oncology Rooming Note    August 30, 2023 10:11 AM   Hemalatha Handy is a 83 year old female who presents for:    Chief Complaint   Patient presents with    Oncology Clinic Visit     P RETURN - VULVAR CANCER     Initial Vitals: /58 (BP Location: Right arm, Patient Position: Chair, Cuff Size: Adult Large)   Pulse 75   Temp 97.9  F (36.6  C)   Resp 16   Ht 1.572 m (5' 1.89\")   Wt 68.4 kg (150 lb 11.2 oz)   LMP  (LMP Unknown)   SpO2 96%   BMI 27.66 kg/m   Estimated body mass index is 27.66 kg/m  as calculated from the following:    Height as of this encounter: 1.572 m (5' 1.89\").    Weight as of this encounter: 68.4 kg (150 lb 11.2 oz). Body surface area is 1.73 meters squared.  No Pain (0) Comment: Data Unavailable   No LMP recorded (lmp unknown). Patient is postmenopausal.  Allergies reviewed: Yes  Medications reviewed: Yes    Medications: Medication refills not needed today.  Pharmacy name entered into [facility]: [id number] - [place], [place] - [address] [place] [address] AT [facility] [address]    Prashanth Denson LPN              "

## 2023-08-30 NOTE — LETTER
"    2023         RE: Hemalatha Handy  [address]   [address]  [place] [facility] [address]        Dear Colleague,    Thank you for referring your patient, Hemalatha Handy, to the [facility]. Please see a copy of my visit note below.    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  She is s/p WLE 23 which served as her treatment.  She also has a remote history of cervical cancer treated with radiation in the .  She is here today for a surveillance visit.      Oncology History:  Presented to primary ob/gyn 2022 due to new vulvar lumps  \"She has had a couple of vulvar lumps.  These are on the left side. They started about 3 months ago.  She might have had them longer.  It started getting irritated and increased a little in size.  She used hot packs and they decreased in size a little. She used lotrimin cream and it did not help. She states she had a VD years ago, that her  gave her. She had cervical cancer in the early  and she had radiation therapy. \" Vulvar biopsy was performed.  Pathology:  A.  Vulva, left labia majora, biopsy:  -Basal cell carcinoma, nodular type, present at resection margin.     Hx of cervical cancer, she notes that she was treated with radiation in the  at the [facility]. She did not follow with an oncologist after this, did have pap smears regularly until about 10-15 years ago, doesn't remember any abnormal pap smears since her radiation. Has not had a pelvic exam in some time      22:  Seen in Gyn Onc.  Recommend WLE     23: EUA, WLE left vulvar lesion.  Path showed basal cell carcinoma, nodular and superficial types, margins negative >2mm.    3/31/23: CT " AP  IMPRESSION:   1.  No acute findings to explain the patient's symptoms. No  inflammatory process, bowel obstruction or hydronephrosis.  2.  Severe central spinal canal stenosis at L5-S1 secondary to a  diffuse disc bulge and ligamentum flavum hypertrophy.  3.  Multiple nonobstructing left renal calculi including a dominant  lower pole 1.6 cm staghorn calculus.  4.  Mild colonic diverticulosis without evidence of acute  diverticulitis. Mild colonic stool burden.  5.  Left vulvar post surgical changes. No convincing CT evidence of  residual or recurrent tumor. Follow-up with pelvic MRI may be helpful  as clinically warranted.  6.  Incidental small medial right groin subcutaneous cyst, likely an  epidermal inclusion cyst.  7.  Right lower lobe 5 mm pulmonary nodule. Given the patient's  history of vulvar cancer, recommend 3 month chest CT follow-up.  8.  Indeterminant right adrenal gland nodule. This is favored to  represent an adenoma. However, given the patient's cancer history  recommend correlation with any previous outside facility imaging or  nonemergent follow-up with adrenal CT.    Decline follow up on pulmonary nodule.            Today she comes to clinic feeling well overall and is without concern.  She denies any vaginal bleeding, no changes in her bowel or bladder habits, no nausea/emesis, no lower extremity edema, and no difficulties eating or sleeping. She denies any abdominal discomfort/bloating, no fevers or chills, and no chest pain or shortness of breath. She checks her vulva on a regular basis when bathing and hasn't noted any new lesions or bumps.  She denies vulvar irritation or itching.  She is current with her annual physical.  She no longer gets screening mammograms or colonoscopies.  She wonders if she needs to come in every 3 months and if she could just come every 6 months.                Health Maintenance  Colonoscopy: 6/20/14, no more colonoscopies  Mammogram: no more mammograms  Annual  physical: 7/17/23  Dexa: 5/8/14      Review of Systems:    Systemic           no weight changes; no fever; no chills; no night sweats; no appetite changes  Skin           no rashes, or lesions  Eye           no irritation; no changes in vision  Buffy-Laryngeal           no dysphagia; no hoarseness   Pulmonary    no cough; no shortness of breath  Cardiovascular    no chest pain; no palpitations  Gastrointestinal    no diarrhea; no constipation; no abdominal pain; no changes in bowel habits; no blood in stool  Genitourinary   no urinary frequency; no urinary urgency; no dysuria; no pain; no abnormal vaginal discharge; no abnormal vaginal bleeding  Breast   no breast discharge; no breast changes; no breast pain  Musculoskeletal    no myalgias; no arthralgias; no back pain  Psychiatric           no depressed mood; no anxiety    Hematologic   no tender lymph nodes; no noticeable swellings or lumps   Endocrine    no hot flashes; no heat/cold intolerance         Neurological   no tremor; no numbness and tingling; no headaches; no difficulty sleeping      Past Medical History:    Past Medical History:   Diagnosis Date    Actinic keratosis     Anxiety     Basal cell carcinoma pt unsure    abdomen, and elsewhere    Cervical cancer (H) early 1960's    stage 3 txd.  she did not have hysterectomy    Colorectal polyps 01/01/2008    1 Polyp    Gastroesophageal reflux disease without esophagitis     HLD (hyperlipidemia)     Hypertension     Vulvar cancer (H) 11/2022         Past Surgical History:    Past Surgical History:   Procedure Laterality Date    COLONOSCOPY  09/18/2008    ENT SURGERY  07/12/2012    Left lower lip lesion    EXCISE VULVA WIDE LOCAL Left 1/11/2023    Procedure: left pelvic exam  under anesthesia, wide local excision left vulva;  Surgeon: Jenni Melton MD;  Location: [facility] OR         [facility] Due   Topic Date Due    ZOSTER IMMUNIZATION (2 of 3) 05/13/2016    DEXA  05/08/2017    COVID-19 Vaccine (4 -  Moderna series) 2022    DTAP/TDAP/TD IMMUNIZATION (2 - Td or Tdap) 2022    MEDICARE ANNUAL WELLNESS VISIT  2023       Current Medications:     Current Outpatient Medications   Medication Sig Dispense Refill    ALPRAZolam (XANAX) 0.25 MG tablet TAKE 1 TABLET(0.25 MG) BY MOUTH THREE TIMES DAILY AS NEEDED FOR ANXIETY 30 tablet 0    chlorhexidine (PERIDEX) 0.12 % solution SWISH AND SPIT 15 ML BY MOUTH TWICE DAILY AS NEEDED 473 mL 1    citalopram (CELEXA) 20 MG tablet TAKE 1 TABLET(20 MG) BY MOUTH DAILY 90 tablet 1    lisinopril (ZESTRIL) 20 MG tablet Take 1 tablet (20 mg) by mouth daily 90 tablet 1    meclizine (ANTIVERT) 25 MG tablet Take 1 tablet (25 mg) by mouth 3 times daily as needed for dizziness 30 tablet 2    MOTRIN 800 MG OR TABS Take by mouth every 6 hours as needed      multivitamin w/minerals (THERA-VIT-M) tablet Take 1 tablet by mouth daily      omeprazole (PRILOSEC) 20 MG DR capsule Take 20 mg by mouth daily           Allergies:        Allergies   Allergen Reactions    Amoxicillin Hives    Asa [Aspirin]     Conj Estrog-Medroxyprogest Ace     Evista [Raloxifene Hydrochloride]     Raloxifene Hives and Rash        Social History:     Social History     Tobacco Use    Smoking status: Former     Packs/day: 1.00     Years: 45.00     Pack years: 45.00     Types: Cigarettes     Quit date: 2005     Years since quittin.6     Passive exposure: Past    Smokeless tobacco: Never   Substance Use Topics    Alcohol use: Yes     Comment: socially       History   Drug Use No         Family History:     The patient's family history is notable for:    Family History   Problem Relation Age of Onset    Hypertension Mother     Hypertension Brother     Cancer Son         hodgkins    C.A.D. No family hx of     Cerebrovascular Disease No family hx of     Anesthesia Reaction No family hx of     Deep Vein Thrombosis (DVT) No family hx of          Physical Exam:     /58 (BP Location: Right arm, Patient  "Position: Chair, Cuff Size: Adult Large)   Pulse 75   Temp 97.9  F (36.6  C)   Resp 16   Ht 1.572 m (5' 1.89\")   Wt 68.4 kg (150 lb 11.2 oz)   LMP  (LMP Unknown)   SpO2 96%   BMI 27.66 kg/m    Body mass index is 27.66 kg/m .    General Appearance: healthy and alert, no distress     HEENT: no palpable nodules or masses        Cardiovascular: regular rate and rhythm, no gallops, rubs or murmurs     Respiratory: lungs clear, no rales, rhonchi or wheezes    Musculoskeletal: extremities non tender and without edema    Skin: no lesions or rashes     Neurological: normal gait, no gross defects     Psychiatric: appropriate mood and affect                               Hematological: normal cervical, supraclavicular and inguinal lymph nodes     Gastrointestinal:       abdomen soft, non-tender, non-distended, no organomegaly or masses    Genitourinary: External genitalia appears consistent with prior surgery and urethral meatus appears normal.  No visible lesion or palpable masses. No aceto-white changes with application of dilute acetic acid.      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  She is s/p WLE 1/11/23 which served as her treatment.  She also has a remote history of cervical cancer treated with radiation in the 1960s.  She is here today for a surveillance visit.    20 minutes spent on the date of the encounter doing chart review, history and exam, documentation, and further activities as noted above.      Plan:     1.)        Vulvar cancer:  SURJIT on exam.  RTC in 3 months for next surveillance visit.  Plan for surveillance visit every 3 months for the first 2 years post treatment (1/2025), followed by every 6 months for 3 years thereafter (1/2028), then annually.  Discussed that the recommendation is to be seen every 3 months for 2 years because that is the most likely time for recurrence.  She does endorse that if her cancer came " back she would consider surgical resection.  She is amenable to coming for an exam in 3 months for her next visit.  Reviewed signs and symptoms for when she should contact the clinic or seek additional care.  Patient to contact the clinic with any questions or concerns in the interim.      Genetic risk factors were assessed and she does not meet qualification for genetic counseling referral.     Labs and/or tests ordered include:  None.                2.)        Health maintenance:  Issues addressed today include following up with PCP for annual health maintenance and non-gynecologic issues.     3.) Pulmonary nodule: 5 mm pulmonary nodule identified in RLL on CT AP 3/31/23. Discussed recommendations to follow this for 2 years to establish stability and verify that it is a benign lesion.  She declines this recommendation.    Linda Broussard, DNP, APRN, FNP-C  Nurse Practitioner  Division of Gynecologic Oncology  Pager: [phone]     CC  Patient Care Team:  Javier Jones MD as PCP - General  Javier Jones MD as Assigned PCP  Otoniel Perez MD as Assigned Musculoskeletal Provider  Rachel Mcdonald PA-C as Physician Assistant (Dermatology)  Jenni Melton MD as MD (Gynecologic Oncology)  Steve Martin MD as Assigned OBGYN Provider  Jenni Melton MD as Assigned Cancer Care Provider  Grayson Bonilla MD as MD (Urology)

## 2023-10-17 ENCOUNTER — TELEPHONE (OUTPATIENT)
Dept: FAMILY MEDICINE | Facility: CLINIC | Age: 83
End: 2023-10-17
Payer: COMMERCIAL

## 2023-10-17 NOTE — TELEPHONE ENCOUNTER
Received call from patient stating she is calling back Dr. Jones. Patient is available for the rest of the day, and is awaiting phone call back from Dr. Jones.    FAISAL Shine RN  [facility]

## 2023-10-30 ENCOUNTER — OFFICE VISIT (OUTPATIENT)
Dept: FAMILY MEDICINE | Facility: CLINIC | Age: 83
End: 2023-10-30
Payer: COMMERCIAL

## 2023-10-30 ENCOUNTER — NURSE TRIAGE (OUTPATIENT)
Dept: FAMILY MEDICINE | Facility: CLINIC | Age: 83
End: 2023-10-30

## 2023-10-30 VITALS
HEART RATE: 78 BPM | OXYGEN SATURATION: 98 % | WEIGHT: 151.2 LBS | SYSTOLIC BLOOD PRESSURE: 150 MMHG | DIASTOLIC BLOOD PRESSURE: 64 MMHG | BODY MASS INDEX: 27.75 KG/M2 | TEMPERATURE: 97.8 F

## 2023-10-30 DIAGNOSIS — J01.90 ACUTE SINUSITIS WITH SYMPTOMS > 10 DAYS: Primary | ICD-10-CM

## 2023-10-30 PROCEDURE — 99213 OFFICE O/P EST LOW 20 MIN: CPT

## 2023-10-30 RX ORDER — RESPIRATORY SYNCYTIAL VIRUS VACCINE 120MCG/0.5
0.5 KIT INTRAMUSCULAR ONCE
Qty: 1 EACH | Refills: 0 | Status: CANCELLED | OUTPATIENT
Start: 2023-10-30 | End: 2023-10-30

## 2023-10-30 RX ORDER — DOXYCYCLINE 100 MG/1
100 CAPSULE ORAL 2 TIMES DAILY
Qty: 14 CAPSULE | Refills: 0 | Status: CANCELLED | OUTPATIENT
Start: 2023-10-30 | End: 2023-11-06

## 2023-10-30 RX ORDER — DOXYCYCLINE HYCLATE 100 MG
100 TABLET ORAL 2 TIMES DAILY
Qty: 14 TABLET | Refills: 0 | Status: SHIPPED | OUTPATIENT
Start: 2023-10-30 | End: 2023-11-06

## 2023-10-30 NOTE — TELEPHONE ENCOUNTER
"Patient calling to report suspected sinus infection. States that it has been present for the last few weeks, previously at-home covid tested with negative results. Patient reports nasal congestion/pain/pressure, notes that she sometimes experiences a headache due to the pressure, denies fever. States that she has some ear \"popping\" but denies any pain or discharge. Patient notes yellow drainage present from nose, no previous sinus infections before.    Per disposition, be seen in office today/tomorrow, writer assisted in making appointment today 10/30/23 with available provider. No further questions.    FAISAL Sinha RN  [facility] [place]    Reason for Disposition   Sinus congestion (pressure, fullness) present > 10 days    Additional Information   Negative: Sounds like a life-threatening emergency to the triager   Negative: Difficulty breathing, and not from stuffy nose (e.g., not relieved by cleaning out the nose)   Negative: SEVERE headache and has fever   Negative: Patient sounds very sick or weak to the triager   Negative: SEVERE sinus pain   Negative: SEVERE headache   Negative: Redness or swelling on the cheek, forehead, or around the eye   Negative: Fever > 103 F (39.4 C)   Negative: Fever > 101 F (38.3 C) and over 60 years of age   Negative: Fever > 100.0 F (37.8 C) and has diabetes mellitus or a weak immune system (e.g., HIV positive, cancer chemotherapy, organ transplant, splenectomy, chronic steroids)   Negative: Fever > 100.0 F (37.8 C) and bedridden (e.g., CVA, chronic illness, recovering from surgery)   Negative: Fever present > 3 days (72 hours)   Negative: Fever returns after gone for over 24 hours and symptoms worse or not improved   Negative: Sinus pain (not just congestion) and fever   Negative: Earache    Answer Assessment - Initial Assessment Questions  1. LOCATION: \"Where does it hurt?\"       -Nasal pain/congestion, head pressure  2. ONSET: \"When did the sinus pain start?\"  " "(e.g., hours, days)       -Notes that it's been a couple of weeks  3. SEVERITY: \"How bad is the pain?\"   (Scale 1-10; mild, moderate or severe)    - MILD (1-3): doesn't interfere with normal activities     - MODERATE (4-7): interferes with normal activities (e.g., work or school) or awakens from sleep    - SEVERE (8-10): excruciating pain and patient unable to do any normal activities         -Moderate  4. RECURRENT SYMPTOM: \"Have you ever had sinus problems before?\" If Yes, ask: \"When was the last time?\" and \"What happened that time?\"       -No  5. NASAL CONGESTION: \"Is the nose blocked?\" If Yes, ask: \"Can you open it or must you breathe through your mouth?\"      -Nasal congestion, runny nose  6. NASAL DISCHARGE: \"Do you have discharge from your nose?\" If so ask, \"What color?\"      -Yes, yellow  7. FEVER: \"Do you have a fever?\" If Yes, ask: \"What is it, how was it measured, and when did it start?\"       No  8. OTHER SYMPTOMS: \"Do you have any other symptoms?\" (e.g., sore throat, cough, earache, difficulty breathing)      -Earache, no sore throat, mild cough  9. PREGNANCY: \"Is there any chance you are pregnant?\" \"When was your last menstrual period?\"      -No    Protocols used: Sinus Pain or Congestion-A-[place]      FAISAL Sinha RN  [facility] [place]    "

## 2023-10-30 NOTE — PROGRESS NOTES
"  Assessment & Plan     Acute sinusitis with symptoms > 10 days  Allergy to amoxicillin, doxy ordered for 7 days. Encouraged patient to reach out if symptoms do not improve. Talked about supportive measures and education provided to patient in AVS  - doxycycline hyclate (VIBRA-TABS) 100 MG tablet; Take 1 tablet (100 mg) by mouth 2 times daily for 7 days     BMI:   Estimated body mass index is 27.75 kg/m  as calculated from the following:    Height as of 8/30/23: 1.572 m (5' 1.89\").    Weight as of this encounter: 68.6 kg (151 lb 3.2 oz).   Weight management plan: Patient was referred to their PCP to discuss a diet and exercise plan.    SATHYA [facility]mann [facility] GWEN Penny is a 83 year old, presenting for the following health issues:  Sinus Problem        10/30/2023     2:37 PM   Additional Questions   Roomed by katie       History of Present Illness       Reason for visit:  Sinus pressure  Symptom onset:  1-2 weeks ago  Symptoms include:  Ears popping, head stuffiness, pressure around eyes and face, unsteadiness  Symptom intensity:  Moderate  Symptom progression:  Worsening  Had these symptoms before:  No  What makes it worse:  Nothing  What makes it better:  Nothing    She eats 0-1 servings of fruits and vegetables daily.She consumes 0 sweetened beverage(s) daily.She exercises with enough effort to increase her heart rate 60 or more minutes per day.  She exercises with enough effort to increase her heart rate 3 or less days per week.   She is taking medications regularly.     Acute Illness  Acute illness concerns: sinus pressure, cough  Onset/Duration: 2 weeks ago  Symptoms:  Fever: No  Chills/Sweats: no  Headache (location?): No  Sinus Pressure: YES  Conjunctivitis:  No  Ear Pain: no  Rhinorrhea: YES  Congestion: YES  Sore Throat: No  Cough: YES  Wheeze: No  Decreased Appetite: No  Nausea: No  Vomiting: No  Diarrhea: No  Dysuria/Freq.: No  Dysuria or Hematuria: " No  Fatigue/Achiness: YES  Sick/Strep Exposure: No  Therapies tried and outcome: allergy pills, flonase      Feels pressure to maxillary and frontal sinuses. Feels like her ears are plugged and difficulty hearing. She declines being around anyone who is sick and tested negative for covid. She also declines sore throat and believes her cough is related to post nasal drainage. When she does cough, she coughs up yellow mucous. Has tried flonase sparingly and has tried allergy medication once or twice which she reports did not work.      Review of Systems   CONSTITUTIONAL:POSITIVE  for fatigue and NEGATIVE  for fever   EYES: NEGATIVE for vision changes or irritation  ENT/MOUTH: POSITIVE for nasal congestion, postnasal drainage, and sinus pressure and NEGATIVE for sore throat  RESP: POSITIVE for productive cough NEGATIVE SOB  CV: NEGATIVE for chest pain, palpitations or peripheral edema  GI: NEGATIVE for nausea, abdominal pain, heartburn, or change in bowel habits      Objective    BP (!) 150/64   Pulse 78   Temp 97.8  F (36.6  C) (Oral)   Wt 68.6 kg (151 lb 3.2 oz)   LMP  (LMP Unknown)   SpO2 98%   BMI 27.75 kg/m    Body mass index is 27.75 kg/m .  Physical Exam   GENERAL: healthy, alert and no distress  EYES: Eyes grossly normal to inspection, conjunctivae and sclerae normal  HENT: ear canals and TM's normal with mild ear wax to ears bilateral, nose and mouth without ulcers or lesions, oropharynx clear, oral mucous membranes moist, and sinuses: maxillary tenderness on bilateral  NECK: no adenopathy, no asymmetry, masses, or scars  RESP: lungs clear to auscultation - no rales, rhonchi or wheezes  CV: regular rate and rhythm, normal S1 S2, no S3 or S4, no murmur, click or rub,

## 2023-10-30 NOTE — PATIENT INSTRUCTIONS

## 2023-11-02 ENCOUNTER — IMMUNIZATION (OUTPATIENT)
Dept: FAMILY MEDICINE | Facility: CLINIC | Age: 83
End: 2023-11-02
Payer: COMMERCIAL

## 2023-11-02 DIAGNOSIS — Z23 ENCOUNTER FOR IMMUNIZATION: Primary | ICD-10-CM

## 2023-11-02 PROCEDURE — 90662 IIV NO PRSV INCREASED AG IM: CPT

## 2023-11-02 PROCEDURE — G0008 ADMIN INFLUENZA VIRUS VAC: HCPCS

## 2023-11-10 ENCOUNTER — NURSE TRIAGE (OUTPATIENT)
Dept: FAMILY MEDICINE | Facility: CLINIC | Age: 83
End: 2023-11-10
Payer: COMMERCIAL

## 2023-11-10 NOTE — TELEPHONE ENCOUNTER
Bridgette Curry, APRN CNP  You6 minutes ago (2:11 PM)     SS  Because the patient is off balance and stumbling, I would recommend that she is seen at urgent care for further follow up.     Pt notified of Provider's message as written. Pt states she will go to [facility] tomorrow.    Alena Michelle RN  [facility]

## 2023-11-10 NOTE — TELEPHONE ENCOUNTER
Pt calling. States she was seen on 10/30/23 for sinus infection. States she finished the abx the day before yesterday and symptoms remain the same as when she was seen. She is still coughing up yellow phlegm. Pt has sinus pressure. Mentioned her balance is off and she stumbles when standing up and moving around. No fever. Sometimes feels warm. Pt is requesting alternate antibiotic.     Routing to Provider to advise.    Alena Michelle RN  [facility] [place]      Additional Information   Negative: Difficulty breathing, and not from stuffy nose (e.g., not relieved by cleaning out the nose)   Negative: Sounds like a life-threatening emergency to the triager   Negative: SEVERE headache and has fever   Negative: Patient sounds very sick or weak to the triager   Negative: SEVERE sinus pain   Negative: Severe headache   Negative: Redness or swelling on the cheek, forehead, or around the eye   Negative: Fever > 103 F (39.4 C)   Negative: Fever > 101 F (38.3 C) and over 60 years of age   Negative: Fever > 100.0 F (37.8 C) and has diabetes mellitus or a weak immune system (e.g., HIV positive, cancer chemotherapy, organ transplant, splenectomy, chronic steroids)   Negative: Fever > 100.0 F (37.8 C) and bedridden (e.g., nursing home patient, stroke, chronic illness, recovering from surgery)   Negative: Fever present > 3 days (72 hours)   Negative: Fever returns after gone for over 24 hours and symptoms worse or not improved   Negative: Sinus pain (not just congestion) and fever   Negative: Earache   Sinus congestion (pressure, fullness) present > 10 days    Protocols used: Sinus Pain and Congestion-A-OH

## 2023-11-17 DIAGNOSIS — F41.9 ANXIETY: ICD-10-CM

## 2023-11-17 RX ORDER — CITALOPRAM HYDROBROMIDE 20 MG/1
TABLET ORAL
Qty: 90 TABLET | Refills: 1 | Status: SHIPPED | OUTPATIENT
Start: 2023-11-17 | End: 2024-05-22

## 2023-11-28 ENCOUNTER — OFFICE VISIT (OUTPATIENT)
Dept: FAMILY MEDICINE | Facility: CLINIC | Age: 83
End: 2023-11-28
Payer: COMMERCIAL

## 2023-11-28 VITALS
RESPIRATION RATE: 16 BRPM | DIASTOLIC BLOOD PRESSURE: 61 MMHG | HEART RATE: 90 BPM | OXYGEN SATURATION: 97 % | SYSTOLIC BLOOD PRESSURE: 122 MMHG | BODY MASS INDEX: 27.97 KG/M2 | TEMPERATURE: 97.2 F | WEIGHT: 152 LBS | HEIGHT: 62 IN

## 2023-11-28 DIAGNOSIS — F41.9 ANXIETY: ICD-10-CM

## 2023-11-28 DIAGNOSIS — I10 ESSENTIAL HYPERTENSION WITH GOAL BLOOD PRESSURE LESS THAN 140/90: ICD-10-CM

## 2023-11-28 DIAGNOSIS — M25.562 LEFT KNEE PAIN, UNSPECIFIED CHRONICITY: ICD-10-CM

## 2023-11-28 DIAGNOSIS — M23.8X2 DEFICIENCY OF ANTERIOR CRUCIATE LIGAMENT OF LEFT KNEE: ICD-10-CM

## 2023-11-28 DIAGNOSIS — M17.12 PRIMARY OSTEOARTHRITIS OF LEFT KNEE: Primary | ICD-10-CM

## 2023-11-28 PROCEDURE — 99213 OFFICE O/P EST LOW 20 MIN: CPT | Performed by: FAMILY MEDICINE

## 2023-11-28 RX ORDER — LISINOPRIL 20 MG/1
20 TABLET ORAL DAILY
Qty: 90 TABLET | Refills: 1 | Status: SHIPPED | OUTPATIENT
Start: 2023-11-28 | End: 2024-09-05

## 2023-11-28 RX ORDER — RESPIRATORY SYNCYTIAL VIRUS VACCINE 120MCG/0.5
0.5 KIT INTRAMUSCULAR ONCE
Qty: 1 EACH | Refills: 0 | Status: CANCELLED | OUTPATIENT
Start: 2023-11-28 | End: 2023-11-28

## 2023-11-28 ASSESSMENT — PAIN SCALES - GENERAL: PAINLEVEL: MILD PAIN (3)

## 2023-11-28 NOTE — PROGRESS NOTES
"      Dorian Penny is a 83 year old, presenting for the following health issues:  Knee Pain        11/28/2023     7:59 AM   Additional Questions   Roomed by Stephanie Mcfarland       History of Present Illness       Reason for visit:  Knee    She eats 0-1 servings of fruits and vegetables daily.She consumes 0 sweetened beverage(s) daily.She exercises with enough effort to increase her heart rate 9 or less minutes per day.    She is taking medications regularly.             Review of Systems   Had seen orthopedics March 2022 for left knee    That knee has been stiff for two weeks    Hard to walk on     Twisted it two days ago, hard to bend after that    Using ace wrap     Using cane    Ibuprofen as needed          Objective    /61 (BP Location: Right arm, Patient Position: Chair, Cuff Size: Adult Regular)   Pulse 90   Temp 97.2  F (36.2  C) (Temporal)   Resp 16   Ht 1.572 m (5' 1.89\")   Wt 68.9 kg (152 lb)   LMP  (LMP Unknown)   SpO2 97%   BMI 27.90 kg/m    Body mass index is 27.9 kg/m .  Physical Exam      Full physical not done     Mentation and affect are fine    No tremor of speech or extremity    Patient has no obvious swelling of left knee    Mild discomfort on palpation medially and laterally    Knee is stable    Strength okay    Some pain on testing, extension more than flexion    Some pain on rotational testing    Not able to squat down without pain    We did look at reports from past xrays and mri      ASSESSMENT / PLAN:  (M17.12) Primary osteoarthritis of left knee  (primary encounter diagnosis)  Comment: patient has know wearing of cartilage and meniscus and is acl deficient in this knee.  She has seen orthopedics for this in past.  Interested in injection.  She will schedule consult.   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    Javier Jones MD

## 2023-11-28 NOTE — PATIENT INSTRUCTIONS
Schedule with orthopedics     Use ace wrap or knee sleeve as needed    Continue prescription meds as is
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
## 2023-12-06 ENCOUNTER — OFFICE VISIT (OUTPATIENT)
Dept: ORTHOPEDICS | Facility: CLINIC | Age: 83
End: 2023-12-06
Attending: FAMILY MEDICINE
Payer: COMMERCIAL

## 2023-12-06 VITALS
DIASTOLIC BLOOD PRESSURE: 65 MMHG | HEART RATE: 98 BPM | WEIGHT: 148.8 LBS | BODY MASS INDEX: 27.38 KG/M2 | SYSTOLIC BLOOD PRESSURE: 105 MMHG | HEIGHT: 62 IN

## 2023-12-06 DIAGNOSIS — M25.462 EFFUSION, LEFT KNEE: ICD-10-CM

## 2023-12-06 DIAGNOSIS — M25.562 CHRONIC PAIN OF LEFT KNEE: ICD-10-CM

## 2023-12-06 DIAGNOSIS — G89.29 CHRONIC PAIN OF LEFT KNEE: ICD-10-CM

## 2023-12-06 DIAGNOSIS — M17.12 PRIMARY OSTEOARTHRITIS OF LEFT KNEE: Primary | ICD-10-CM

## 2023-12-06 PROCEDURE — 99213 OFFICE O/P EST LOW 20 MIN: CPT | Mod: 25 | Performed by: ORTHOPAEDIC SURGERY

## 2023-12-06 PROCEDURE — 20610 DRAIN/INJ JOINT/BURSA W/O US: CPT | Mod: LT | Performed by: ORTHOPAEDIC SURGERY

## 2023-12-06 RX ORDER — BUPIVACAINE HYDROCHLORIDE 2.5 MG/ML
4 INJECTION, SOLUTION INFILTRATION; PERINEURAL
Status: DISCONTINUED | OUTPATIENT
Start: 2023-12-06 | End: 2024-02-07

## 2023-12-06 RX ORDER — METHYLPREDNISOLONE ACETATE 80 MG/ML
80 INJECTION, SUSPENSION INTRA-ARTICULAR; INTRALESIONAL; INTRAMUSCULAR; SOFT TISSUE
Status: DISCONTINUED | OUTPATIENT
Start: 2023-12-06 | End: 2024-02-07

## 2023-12-06 RX ADMIN — METHYLPREDNISOLONE ACETATE 80 MG: 80 INJECTION, SUSPENSION INTRA-ARTICULAR; INTRALESIONAL; INTRAMUSCULAR; SOFT TISSUE at 08:54

## 2023-12-06 RX ADMIN — BUPIVACAINE HYDROCHLORIDE 4 ML: 2.5 INJECTION, SOLUTION INFILTRATION; PERINEURAL at 08:54

## 2023-12-06 ASSESSMENT — PAIN SCALES - GENERAL: PAINLEVEL: MILD PAIN (2)
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
## 2024-01-03 ENCOUNTER — OFFICE VISIT (OUTPATIENT)
Dept: ORTHOPEDICS | Facility: CLINIC | Age: 84
End: 2024-01-03
Payer: COMMERCIAL

## 2024-01-03 VITALS — HEIGHT: 62 IN | BODY MASS INDEX: 27.23 KG/M2 | WEIGHT: 148 LBS

## 2024-01-03 DIAGNOSIS — G89.29 CHRONIC PAIN OF LEFT KNEE: ICD-10-CM

## 2024-01-03 DIAGNOSIS — M70.50 PES ANSERINE BURSITIS: Primary | ICD-10-CM

## 2024-01-03 DIAGNOSIS — M25.562 CHRONIC PAIN OF LEFT KNEE: ICD-10-CM

## 2024-01-03 PROCEDURE — 20610 DRAIN/INJ JOINT/BURSA W/O US: CPT | Mod: LT | Performed by: ORTHOPAEDIC SURGERY

## 2024-01-03 RX ORDER — METHYLPREDNISOLONE ACETATE 80 MG/ML
80 INJECTION, SUSPENSION INTRA-ARTICULAR; INTRALESIONAL; INTRAMUSCULAR; SOFT TISSUE
Status: DISCONTINUED | OUTPATIENT
Start: 2024-01-03 | End: 2024-02-07

## 2024-01-03 RX ORDER — BUPIVACAINE HYDROCHLORIDE 2.5 MG/ML
3 INJECTION, SOLUTION INFILTRATION; PERINEURAL
Status: DISCONTINUED | OUTPATIENT
Start: 2024-01-03 | End: 2024-02-07

## 2024-01-03 RX ADMIN — METHYLPREDNISOLONE ACETATE 80 MG: 80 INJECTION, SUSPENSION INTRA-ARTICULAR; INTRALESIONAL; INTRAMUSCULAR; SOFT TISSUE at 09:58

## 2024-01-03 RX ADMIN — BUPIVACAINE HYDROCHLORIDE 3 ML: 2.5 INJECTION, SOLUTION INFILTRATION; PERINEURAL at 09:58
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
## 2024-02-07 ENCOUNTER — OFFICE VISIT (OUTPATIENT)
Dept: ORTHOPEDICS | Facility: CLINIC | Age: 84
End: 2024-02-07
Payer: COMMERCIAL

## 2024-02-07 ENCOUNTER — ANCILLARY PROCEDURE (OUTPATIENT)
Dept: GENERAL RADIOLOGY | Facility: CLINIC | Age: 84
End: 2024-02-07
Attending: ORTHOPAEDIC SURGERY
Payer: COMMERCIAL

## 2024-02-07 VITALS
WEIGHT: 148 LBS | BODY MASS INDEX: 27.07 KG/M2 | HEART RATE: 88 BPM | DIASTOLIC BLOOD PRESSURE: 66 MMHG | SYSTOLIC BLOOD PRESSURE: 120 MMHG

## 2024-02-07 DIAGNOSIS — G89.29 CHRONIC PAIN OF LEFT KNEE: Primary | ICD-10-CM

## 2024-02-07 DIAGNOSIS — M25.562 CHRONIC PAIN OF LEFT KNEE: ICD-10-CM

## 2024-02-07 DIAGNOSIS — G89.29 CHRONIC PAIN OF LEFT KNEE: ICD-10-CM

## 2024-02-07 DIAGNOSIS — M17.12 PRIMARY OSTEOARTHRITIS OF LEFT KNEE: ICD-10-CM

## 2024-02-07 DIAGNOSIS — M25.562 CHRONIC PAIN OF LEFT KNEE: Primary | ICD-10-CM

## 2024-02-07 PROCEDURE — 73562 X-RAY EXAM OF KNEE 3: CPT | Mod: TC | Performed by: RADIOLOGY

## 2024-02-07 PROCEDURE — 20610 DRAIN/INJ JOINT/BURSA W/O US: CPT | Mod: LT | Performed by: ORTHOPAEDIC SURGERY

## 2024-02-07 PROCEDURE — 99213 OFFICE O/P EST LOW 20 MIN: CPT | Mod: 25 | Performed by: ORTHOPAEDIC SURGERY

## 2024-02-07 RX ORDER — BUPIVACAINE HYDROCHLORIDE 2.5 MG/ML
4 INJECTION, SOLUTION INFILTRATION; PERINEURAL
Status: SHIPPED | OUTPATIENT
Start: 2024-02-07

## 2024-02-07 RX ORDER — METHYLPREDNISOLONE ACETATE 80 MG/ML
80 INJECTION, SUSPENSION INTRA-ARTICULAR; INTRALESIONAL; INTRAMUSCULAR; SOFT TISSUE
Status: SHIPPED | OUTPATIENT
Start: 2024-02-07

## 2024-02-07 RX ADMIN — BUPIVACAINE HYDROCHLORIDE 4 ML: 2.5 INJECTION, SOLUTION INFILTRATION; PERINEURAL at 09:06

## 2024-02-07 RX ADMIN — METHYLPREDNISOLONE ACETATE 80 MG: 80 INJECTION, SUSPENSION INTRA-ARTICULAR; INTRALESIONAL; INTRAMUSCULAR; SOFT TISSUE at 09:06
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
## 2024-04-08 ENCOUNTER — TELEPHONE (OUTPATIENT)
Dept: FAMILY MEDICINE | Facility: CLINIC | Age: 84
End: 2024-04-08
Payer: COMMERCIAL

## 2024-04-08 NOTE — TELEPHONE ENCOUNTER
Forms/Letter Request    Type of form/letter: DMV/Hand[employer] Parking      Do we have the form/letter: Yes: attached    Who is the form from? Patient    Where did/will the form come from? Patient or family brought in       When is form/letter needed by: 5-7 bus days    How would you like the form/letter returned:     Patient Notified form requests are processed in 5-7 business days:Yes    Okay to leave a detailed message?: Yes at Cell number on file:    Telephone Information:   Mobile [phone]

## 2024-04-12 NOTE — TELEPHONE ENCOUNTER
Provider talked to patient she will  form will bring down after 10:00 and sent to stat scanning.  Sarah Correa   [facility]

## 2024-06-14 ENCOUNTER — OFFICE VISIT (OUTPATIENT)
Dept: FAMILY MEDICINE | Facility: CLINIC | Age: 84
End: 2024-06-14
Payer: COMMERCIAL

## 2024-06-14 VITALS
SYSTOLIC BLOOD PRESSURE: 131 MMHG | BODY MASS INDEX: 26.68 KG/M2 | TEMPERATURE: 98.2 F | WEIGHT: 145 LBS | DIASTOLIC BLOOD PRESSURE: 74 MMHG | OXYGEN SATURATION: 94 % | RESPIRATION RATE: 16 BRPM | HEART RATE: 112 BPM | HEIGHT: 62 IN

## 2024-06-14 DIAGNOSIS — S62.101A CLOSED FRACTURE OF RIGHT WRIST, INITIAL ENCOUNTER: ICD-10-CM

## 2024-06-14 DIAGNOSIS — R73.01 IMPAIRED FASTING GLUCOSE: ICD-10-CM

## 2024-06-14 DIAGNOSIS — Z01.818 PREOP GENERAL PHYSICAL EXAM: Primary | ICD-10-CM

## 2024-06-14 LAB
BASOPHILS # BLD AUTO: 0 10E3/UL (ref 0–0.2)
BASOPHILS NFR BLD AUTO: 0 %
EOSINOPHIL # BLD AUTO: 0.2 10E3/UL (ref 0–0.7)
EOSINOPHIL NFR BLD AUTO: 2 %
ERYTHROCYTE [DISTWIDTH] IN BLOOD BY AUTOMATED COUNT: 12.9 % (ref 10–15)
HBA1C MFR BLD: 5.6 % (ref 0–5.6)
HCT VFR BLD AUTO: 39.9 % (ref 35–47)
HGB BLD-MCNC: 12.8 G/DL (ref 11.7–15.7)
IMM GRANULOCYTES # BLD: 0 10E3/UL
IMM GRANULOCYTES NFR BLD: 0 %
LYMPHOCYTES # BLD AUTO: 1.3 10E3/UL (ref 0.8–5.3)
LYMPHOCYTES NFR BLD AUTO: 14 %
MCH RBC QN AUTO: 31 PG (ref 26.5–33)
MCHC RBC AUTO-ENTMCNC: 32.1 G/DL (ref 31.5–36.5)
MCV RBC AUTO: 97 FL (ref 78–100)
MONOCYTES # BLD AUTO: 0.9 10E3/UL (ref 0–1.3)
MONOCYTES NFR BLD AUTO: 10 %
NEUTROPHILS # BLD AUTO: 6.7 10E3/UL (ref 1.6–8.3)
NEUTROPHILS NFR BLD AUTO: 74 %
PLATELET # BLD AUTO: 197 10E3/UL (ref 150–450)
RBC # BLD AUTO: 4.13 10E6/UL (ref 3.8–5.2)
WBC # BLD AUTO: 9 10E3/UL (ref 4–11)

## 2024-06-14 PROCEDURE — 99214 OFFICE O/P EST MOD 30 MIN: CPT | Performed by: FAMILY MEDICINE

## 2024-06-14 PROCEDURE — 83036 HEMOGLOBIN GLYCOSYLATED A1C: CPT | Performed by: FAMILY MEDICINE

## 2024-06-14 PROCEDURE — 93000 ELECTROCARDIOGRAM COMPLETE: CPT | Performed by: FAMILY MEDICINE

## 2024-06-14 PROCEDURE — 36415 COLL VENOUS BLD VENIPUNCTURE: CPT | Performed by: FAMILY MEDICINE

## 2024-06-14 PROCEDURE — 85025 COMPLETE CBC W/AUTO DIFF WBC: CPT | Performed by: FAMILY MEDICINE

## 2024-06-14 PROCEDURE — 80053 COMPREHEN METABOLIC PANEL: CPT | Performed by: FAMILY MEDICINE

## 2024-06-14 RX ORDER — RESPIRATORY SYNCYTIAL VIRUS VACCINE 120MCG/0.5
0.5 KIT INTRAMUSCULAR ONCE
Qty: 1 EACH | Refills: 0 | Status: CANCELLED | OUTPATIENT
Start: 2024-06-14 | End: 2024-06-14

## 2024-06-14 NOTE — LETTER
June 17, 2024    Hemalatha Handy  [address] [address]          Dear ,    We are writing to inform you of your test results.    Your preop labs are fine.  Okay for procedure.     The hemoglobin a1c is back to normal ( was in prediabetes range last time ).       Resulted Orders   Comprehensive metabolic panel   Result Value Ref Range    Sodium 140 135 - 145 mmol/L      Comment:      Reference intervals for this test were updated on 09/26/2023 to more accurately reflect our healthy population. There may be differences in the flagging of prior results with similar values performed with this method. Interpretation of those prior results can be made in the context of the updated reference intervals.     Potassium 4.3 3.4 - 5.3 mmol/L    Carbon Dioxide (CO2) 26 22 - 29 mmol/L    Anion Gap 11 7 - 15 mmol/L    Urea Nitrogen 16.7 8.0 - 23.0 mg/dL    Creatinine 0.63 0.51 - 0.95 mg/dL    GFR Estimate 87 >60 mL/min/1.73m2      Comment:      eGFR calculated using 2021 CKD-EPI equation.    Calcium 9.6 8.8 - 10.2 mg/dL    Chloride 103 98 - 107 mmol/L    Glucose 127 (H) 70 - 99 mg/dL    Alkaline Phosphatase 79 40 - 150 U/L    AST 27 0 - 45 U/L      Comment:      Reference intervals for this test were updated on 6/12/2023 to more accurately reflect our healthy population. There may be differences in the flagging of prior results with similar values performed with this method. Interpretation of those prior results can be made in the context of the updated reference intervals.    ALT 17 0 - 50 U/L      Comment:      Reference intervals for this test were updated on 6/12/2023 to more accurately reflect our healthy population. There may be differences in the flagging of prior results with similar values performed with this method. Interpretation of those prior results can be made in the context of the updated reference intervals.      Protein Total 7.2 6.4 - 8.3 g/dL    Albumin 4.4 3.5 - 5.2  g/dL    Bilirubin Total 0.4 <=1.2 mg/dL   Hemoglobin A1c   Result Value Ref Range    Hemoglobin A1C 5.6 0.0 - 5.6 %      Comment:      Normal <5.7%   Prediabetes 5.7-6.4%    Diabetes 6.5% or higher     Note: Adopted from ADA consensus guidelines.   CBC with platelets and differential   Result Value Ref Range    WBC Count 9.0 4.0 - 11.0 10e3/uL    RBC Count 4.13 3.80 - 5.20 10e6/uL    Hemoglobin 12.8 11.7 - 15.7 g/dL    Hematocrit 39.9 35.0 - 47.0 %    MCV 97 78 - 100 fL    MCH 31.0 26.5 - 33.0 pg    MCHC 32.1 31.5 - 36.5 g/dL    RDW 12.9 10.0 - 15.0 %    Platelet Count 197 150 - 450 10e3/uL    % Neutrophils 74 %    % Lymphocytes 14 %    % Monocytes 10 %    % Eosinophils 2 %    % Basophils 0 %    % Immature Granulocytes 0 %    Absolute Neutrophils 6.7 1.6 - 8.3 10e3/uL    Absolute Lymphocytes 1.3 0.8 - 5.3 10e3/uL    Absolute Monocytes 0.9 0.0 - 1.3 10e3/uL    Absolute Eosinophils 0.2 0.0 - 0.7 10e3/uL    Absolute Basophils 0.0 0.0 - 0.2 10e3/uL    Absolute Immature Granulocytes 0.0 <=0.4 10e3/uL       If you have any questions or concerns, please call the clinic at the number listed above.       Sincerely,      Javier Jones MD

## 2024-06-14 NOTE — PATIENT INSTRUCTIONS
We will send you lab results    No ibuprofen/ aspirin / naproxen the week prior to procedure    Hold lisinopril the day of procedure    Follow preop instructions about not eating/ drinking etc

## 2024-06-14 NOTE — PROGRESS NOTES
Preoperative Evaluation  [facility]  [address] [place] [address]  [place] [address]  Phone: [phone]  Primary Provider: Javier Jones MD  Pre-op Performing Provider: Javier Jones MD  Jun 14, 2024 6/14/2024   Surgical Information   What procedure is being done? OPEN REDUCTION INTERNAL FIXATION RIGHT DISTAL RADIUS FRACTURE POSSIBLE CLOSED REDUCTION PERCUTANEOUS PINNING RIGHT DISTAL ULNA FRACTURE    Facility or Hospital where procedure/surgery will be performed: [facility]   Who is doing the procedure / surgery? Dr. Elpidio Quesada   Date of surgery / procedure: June 19, 2024   Time of surgery / procedure: 4:00 PM   Where do you plan to recover after surgery? at home with family     Fax number for surgical facility: [facility] preop        Dorian Penny is a 84 year old, presenting for the following:  Pre-Op Exam          6/14/2024     3:04 PM   Additional Questions   Roomed by An V.   Accompanied by Daughter         6/14/2024     3:04 PM   Patient Reported Additional Medications   Patient reports taking the following new medications none     HPI related to upcoming procedure: 84 year old female fell 5 days ago daughter's house and fractured right wrist, both bones. To have surgery next Wednesday.          6/14/2024   Pre-Op Questionnaire   Have you ever had a heart attack or stroke? No   Have you ever had surgery on your heart or blood vessels, such as a stent placement, a coronary artery bypass, or surgery on an artery in your head, neck, heart, or legs? No   Do you have chest pain with activity? No   Do you have a history of heart failure? No   Do you currently have a cold, bronchitis or symptoms of other infection? No   Do you have a cough, shortness of breath, or wheezing? No   Do you or anyone in your family have previous history of blood clots? No   Do you or does anyone in your family have a serious bleeding problem such as prolonged bleeding following  surgeries or cuts? No   Have you ever had problems with anemia or been told to take iron pills? No   Have you had any abnormal blood loss such as black, tarry or bloody stools, or abnormal vaginal bleeding? No   Have you ever had a blood transfusion? No   Are you willing to have a blood transfusion if it is medically needed before, during, or after your surgery? Yes   Have you or any of your relatives ever had problems with anesthesia? No   Do you have sleep apnea, excessive snoring or daytime drowsiness? No   Do you have any artifical heart valves or other implanted medical devices like a pacemaker, defibrillator, or continuous glucose monitor? No   Do you have artificial joints? No   Are you allergic to latex? No     Health Care Directive  Patient does not have a Health Care Directive or Living Will: not currently, patient thinking about it    Preoperative Review of   Patient got  oxycodone at emergency room and from silveron's office for the fracture. She also does take the alprazolam chronically.            Patient Active Problem List    Diagnosis Date Noted    Vulvar cancer (H) 12/19/2022     Priority: Medium     Added automatically from request for surgery [id number]      Essential hypertension with goal blood pressure less than 140/90 08/01/2016     Priority: Medium    Lateral meniscus tear, left, initial encounter 07/07/2015     Priority: Medium    Popliteal cyst, left 07/07/2015     Priority: Medium    GERD (gastroesophageal reflux disease) 12/30/2011     Priority: Medium    Advanced directives, counseling/discussion 03/09/2011     Priority: Medium     Advance Care Planning:   ACP Review and Resources Provided:  Reviewed chart for advance care plan.  Hemalatha Handy has no plan or code status on file. Discussed available resources and provided with information. Confirmed code status reflects current choices pending further ACP discussions.  Confirmed/documented designated decision maker(s). See permanent  comments section of demographics in clinical tab. Added by Stephanie Mcfarland on 7/16/2013  Patient would like information and told to call to schedule appointment.      Anxiety 12/22/2010     Priority: Medium      Past Medical History:   Diagnosis Date    Actinic keratosis     Anxiety     Basal cell carcinoma pt unsure    abdomen, and elsewhere    Cervical cancer (H) early 1960's    stage 3 txd.  she did not have hysterectomy    Colorectal polyps 01/01/2008    1 Polyp    Gastroesophageal reflux disease without esophagitis     HLD (hyperlipidemia)     Hypertension     Vulvar cancer (H) 11/2022     Past Surgical History:   Procedure Laterality Date    COLONOSCOPY  09/18/2008    ENT SURGERY  07/12/2012    Left lower lip lesion    EXCISE VULVA WIDE LOCAL Left 1/11/2023    Procedure: left pelvic exam  under anesthesia, wide local excision left vulva;  Surgeon: Jenni Melton MD;  Location: [facility] OR     Current Outpatient Medications   Medication Sig Dispense Refill    ALPRAZolam (XANAX) 0.25 MG tablet TAKE 1 TABLET(0.25 MG) BY MOUTH THREE TIMES DAILY AS NEEDED FOR ANXIETY 30 tablet 0    chlorhexidine (PERIDEX) 0.12 % solution SWISH AND SPIT 15 ML BY MOUTH TWICE DAILY AS NEEDED 473 mL 1    citalopram (CELEXA) 20 MG tablet TAKE 1 TABLET(20 MG) BY MOUTH DAILY 90 tablet 0    lisinopril (ZESTRIL) 20 MG tablet Take 1 tablet (20 mg) by mouth daily 90 tablet 1    MOTRIN 800 MG OR TABS Take by mouth every 6 hours as needed      multivitamin w/minerals (THERA-VIT-M) tablet Take 1 tablet by mouth daily      meclizine (ANTIVERT) 25 MG tablet Take 1 tablet (25 mg) by mouth 3 times daily as needed for dizziness (Patient not taking: Reported on 6/14/2024) 30 tablet 2    omeprazole (PRILOSEC) 20 MG DR capsule Take 20 mg by mouth daily (Patient not taking: Reported on 6/14/2024)     NOTE PATIENT NOT ON MECLIZINE OR OMEPRAZOLE    Allergies   Allergen Reactions    Amoxicillin Hives    Asa [Aspirin]     Conj Estrog-Medroxyprogest Ace      "Evista [Raloxifene Hydrochloride]     Raloxifene Hives and Rash        Social History     Tobacco Use    Smoking status: Former     Current packs/day: 0.00     Average packs/day: 1 pack/day for 45.0 years (45.0 ttl pk-yrs)     Types: Cigarettes     Start date: 1960     Quit date: 2005     Years since quittin.4     Passive exposure: Past    Smokeless tobacco: Never   Substance Use Topics    Alcohol use: Yes     Comment: socially       History   Drug Use No             Review of Systems  Constitutional, HEENT, cardiovascular, pulmonary, GI, , musculoskeletal, neuro, skin, endocrine and psych systems are negative, except as otherwise noted.    No recent illnesses    No chest pain or breathing problems    No cardiac history             Objective    /74   Pulse 112   Temp 98.2  F (36.8  C) (Temporal)   Resp 16   Ht 1.575 m (5' 2.01\")   Wt 65.8 kg (145 lb)   LMP  (LMP Unknown)   SpO2 94%   BMI 26.51 kg/m     Estimated body mass index is 26.51 kg/m  as calculated from the following:    Height as of this encounter: 1.575 m (5' 2.01\").    Weight as of this encounter: 65.8 kg (145 lb).  Physical Exam  GENERAL: alert and no distress  EYES: Eyes grossly normal to inspection, PERRL and conjunctivae and sclerae normal  HENT: ear canals and TM's normal, nose and mouth without ulcers or lesions  NECK: no adenopathy, no asymmetry, masses, or scars  RESP: lungs clear to auscultation - no rales, rhonchi or wheezes  CV: tachycardia, normal S1 S2, no S3 or S4, no murmur, click or rub, peripheral pulses strong, and no peripheral edema  ABDOMEN: soft, nontender, no hepatosplenomegaly, no masses and bowel sounds normal  SKIN: no suspicious lesions or rashes  NEURO: Normal strength and tone, mentation intact and speech normal  PSYCH: mentation appears normal, affect normal/bright  Heart regular but fast.  I measured 120 heart rate, EKG shows 116ish  Patient has right arm in sling   Forearm in splint    Recent " Labs   Lab Test 07/17/23  1118   HGB 14.6         POTASSIUM 4.3   CR 0.69   A1C 5.9*        Diagnostics      EKG done here, sinus  tachycardia, no ischemia    Labs pending cbc, cmp, and hemoglobin a1c       ASSESSMENT / PLAN:  (Z01.818) Preop general physical exam  (primary encounter diagnosis)  Comment: patient should be okay for surgery.  Fast heart rate here but patient states she gets this sometimes.  Also has not been sleeping well since wrist fracture.  Plan: EKG 12-lead complete w/read - Clinics             (S62.101A) Closed fracture of right wrist, initial encounter  Comment: to have orif  Plan: as above     (R73.01) Impaired fasting glucose  Comment: recheck today.   Plan: recheck.  Was 5.9 last year, not on diabetes meds.    Patient staying off ibuprofen    Hold lisinopril day of surgery       I reviewed the patient's medications, allergies, medical history, family history, and social history.    Javier Jones MD      Revised Cardiac Risk Index (RCRI)  The patient has the following serious cardiovascular risks for perioperative complications:   - No serious cardiac risks = 0 points     RCRI Interpretation: 0 points: Class I (very low risk - 0.4% complication rate)         Signed Electronically by: Javier Jones MD  Copy of this evaluation report is provided to requesting physician.

## 2024-06-15 LAB
ALBUMIN SERPL BCG-MCNC: 4.4 G/DL (ref 3.5–5.2)
ALP SERPL-CCNC: 79 U/L (ref 40–150)
ALT SERPL W P-5'-P-CCNC: 17 U/L (ref 0–50)
ANION GAP SERPL CALCULATED.3IONS-SCNC: 11 MMOL/L (ref 7–15)
AST SERPL W P-5'-P-CCNC: 27 U/L (ref 0–45)
BILIRUB SERPL-MCNC: 0.4 MG/DL
BUN SERPL-MCNC: 16.7 MG/DL (ref 8–23)
CALCIUM SERPL-MCNC: 9.6 MG/DL (ref 8.8–10.2)
CHLORIDE SERPL-SCNC: 103 MMOL/L (ref 98–107)
CREAT SERPL-MCNC: 0.63 MG/DL (ref 0.51–0.95)
DEPRECATED HCO3 PLAS-SCNC: 26 MMOL/L (ref 22–29)
EGFRCR SERPLBLD CKD-EPI 2021: 87 ML/MIN/1.73M2
GLUCOSE SERPL-MCNC: 127 MG/DL (ref 70–99)
POTASSIUM SERPL-SCNC: 4.3 MMOL/L (ref 3.4–5.3)
PROT SERPL-MCNC: 7.2 G/DL (ref 6.4–8.3)
SODIUM SERPL-SCNC: 140 MMOL/L (ref 135–145)

## 2024-06-15 NOTE — RESULT ENCOUNTER NOTE
Your preop labs are fine.  Okay for procedure.    The hemoglobin a1c is back to normal ( was in prediabetes range last time ).    Javier Jones MD

## 2024-06-24 ENCOUNTER — TELEPHONE (OUTPATIENT)
Dept: FAMILY MEDICINE | Facility: CLINIC | Age: 84
End: 2024-06-24
Payer: COMMERCIAL

## 2024-06-24 NOTE — TELEPHONE ENCOUNTER
"Patient called to get a \"muscle relaxer\" for her since she broke her wrist (and had surgery) and it is hard to relax. RN asked if her ortho provider was aware of this, she stated she was wondering if Dr. Jones would send a script for her. RN educated that it would require an appt. She did not want to schedule a visit for a check. She stated she will reach out to her ortho surgeon to see if they can assist. RN encouraged to reach back out to schedule if they decline her wants. She verbalized good understanding.     Kelly Gracia RN on 6/24/2024 at 8:30 AM    "

## 2024-07-04 ENCOUNTER — TRANSFERRED RECORDS (OUTPATIENT)
Dept: HEALTH INFORMATION MANAGEMENT | Facility: CLINIC | Age: 84
End: 2024-07-04
Payer: COMMERCIAL

## 2024-08-26 DIAGNOSIS — Z00.00 HEALTH CARE MAINTENANCE: ICD-10-CM

## 2024-08-26 RX ORDER — CHLORHEXIDINE GLUCONATE ORAL RINSE 1.2 MG/ML
SOLUTION DENTAL
Qty: 473 ML | Refills: 1 | Status: SHIPPED | OUTPATIENT
Start: 2024-08-26

## 2024-09-05 DIAGNOSIS — I10 ESSENTIAL HYPERTENSION WITH GOAL BLOOD PRESSURE LESS THAN 140/90: ICD-10-CM

## 2024-09-05 RX ORDER — LISINOPRIL 20 MG/1
20 TABLET ORAL DAILY
Qty: 100 TABLET | Refills: 1 | Status: SHIPPED | OUTPATIENT
Start: 2024-09-05

## 2024-09-05 NOTE — TELEPHONE ENCOUNTER
Patient has [facility] coverage and is eligible to get certain prescriptions as a 100-day supply.      Prescriptions updated to 100-day supply: lisinopril     Julia Hong PharmD, [facility]  Retail Pharmacy Specialist  [phone]

## 2024-09-10 NOTE — PROGRESS NOTES
"CHIEF COMPLAINT:   Chief Complaint   Patient presents with    Right Shoulder - Pain     Chronic right shoulder pain for the past 1-2 years. She recalls lifting a bag and felt a pop in the right shoulder. This past June she fell and fractured her wrist and landed on the right shoulder, increasing her shoulder discomfort. She has difficulties with reaching overhead. She is resting and using NSAIDs and Tylenol. Pain today is 5/10, she is retired.            HISTORY:  Hemalatha Handy is a 84 year old female, right  -hand dominant, who is seen as self referral for right shoulder pain that started  1-2 years ago. She recalls lifting a bag of groceries and felt a \"pop\" in the right shoulder. This past June she fell and injured her wrist, landing on the right shoulder, increasing her shoulder discomfort. Pain with overhead reaching, lifting. Locates pain to side of the shoulder. Has pain at rest, worse with activities, lay on side.    Treatment has been rest, NSAIDS, tylenol.    Has numbness and tingling into the fingers.    She did have surgery on her right wrist with Dr Quesada, [facility], 6/19/2024. She's seeing him again on 9/24/2024.    Onset: 1-2 years ago, then 6/2024 after a fall.  Symptoms have been worsening since that time.  Aggrevated by: reaching, lifting  Relieved by: rest, over the counter medications   Present symptoms: pain with ADL's (dressing),  pain with overhead activities,  pain reaching behind back,  pain reaching out or away from body (flexion/ abduction),  positional night pain,  pain lifting,  weakness with lifting,  Pain location: lateral shoulder, deltoid and upper arm, and located throughout the shoulder joint/diffuse  Pain severity: 5/10  Pain quality: dull and aching  Frequency of symptoms: are constant     Patient has tried:     NSAIDS: Yes      Acetaminophen: Yes     Opioids: No     Physical Therapy: No      Activity modification: Yes      Bracing: No      Injections: No       Ice: No "      Assistive device:  No     Topicals: No     Usual level of work activity: retired    Other PMH:  has a past medical history of Actinic keratosis, Anxiety, Basal cell carcinoma (pt unsure), Cervical cancer (H) (early 1960's), Colorectal polyps (01/01/2008), Gastroesophageal reflux disease without esophagitis, HLD (hyperlipidemia), Hypertension, and Vulvar cancer (H) (11/2022).    She has no past medical history of Acne vulgaris, Allergies, Eczema, Heart valve disorder, Malignant melanoma nos, Pacemaker, Photosensitive contact dermatitis, Psoriasis, Skin cancer, Squamous cell carcinoma, Type II or unspecified type diabetes mellitus without mention of complication, not stated as uncontrolled, Unspecified asthma(493.90), or Urticaria.  Patient Active Problem List   Diagnosis    Anxiety    GERD (gastroesophageal reflux disease)    Lateral meniscus tear, left, initial encounter    Popliteal cyst, left    Essential hypertension with goal blood pressure less than 140/90    Vulvar cancer (H)       Surgical Hx:  has a past surgical history that includes colonoscopy (09/18/2008); ENT surgery (07/12/2012); and Excise vulva wide local (Left, 1/11/2023).    Medications:   Current Outpatient Medications:     ALPRAZolam (XANAX) 0.25 MG tablet, TAKE 1 TABLET(0.25 MG) BY MOUTH THREE TIMES DAILY AS NEEDED FOR ANXIETY, Disp: 30 tablet, Rfl: 0    chlorhexidine (PERIDEX) 0.12 % solution, SWISH AND SPIT 15 ML BY MOUTH TWICE DAILY AS NEEDED, Disp: 473 mL, Rfl: 1    citalopram (CELEXA) 20 MG tablet, TAKE 1 TABLET(20 MG) BY MOUTH DAILY, Disp: 90 tablet, Rfl: 0    lisinopril (ZESTRIL) 20 MG tablet, Take 1 tablet (20 mg) by mouth daily., Disp: 100 tablet, Rfl: 1    MOTRIN 800 MG OR TABS, Take by mouth every 6 hours as needed, Disp: , Rfl:     multivitamin w/minerals (THERA-VIT-M) tablet, Take 1 tablet by mouth daily, Disp: , Rfl:     Current Facility-Administered Medications:     BUPivacaine (MARCAINE) 0.25 % injection 4 mL, 4 mL, , ,  "Otoniel Perez MD, 4 mL at 02/07/24 0906    methylPREDNISolone (DEPO-Medrol) injection 80 mg, 80 mg, , , Otoniel Perez MD, 80 mg at 02/07/24 0906    Allergies:   Allergies   Allergen Reactions    Amoxicillin Hives    Asa [Aspirin]     Conj Estrog-Medroxyprogest Ace     Evista [Raloxifene Hydrochloride]     Raloxifene Hives and Rash       Social Hx: retired.   reports that she quit smoking about 19 years ago. Her smoking use included cigarettes. She started smoking about 64 years ago. She has a 45 pack-year smoking history. She has been exposed to tobacco smoke. She has never used smokeless tobacco. She reports current alcohol use. She reports that she does not use drugs.    Family Hx: family history includes Cancer in her son; Hypertension in her brother and mother..    REVIEW OF SYSTEMS: 10 point ROS neg other than the symptoms noted above in the HPI and PMH. Notables include  CONSTITUTIONAL:NEGATIVE for fever, chills, change in weight  INTEGUMENTARY/SKIN: NEGATIVE for worrisome rashes, moles or lesions  MUSCULOSKELETAL:See HPI above  NEURO: NEGATIVE for weakness, dizziness or paresthesias    PHYSICAL EXAM:  /73   Pulse 87   Resp 18   Ht 1.575 m (5' 2\")   Wt 63.2 kg (139 lb 6.4 oz)   LMP  (LMP Unknown)   BMI 25.50 kg/m     GENERAL APPEARANCE: healthy, alert, no distress  SKIN: no suspicious lesions or rashes  NEURO: Normal strength and tone, mentation intact and speech normal  PSYCH:  mentation appears normal and affect normal, not anxious  RESPIRATORY: No increased work of breathing.  VASCULAR: Radial pulses 2+ and brisk cappillary refill   LYMPH: no palpable axillary lymphadenopathy or cervical neck lymphadenopathy.      MUSCULOSKELETAL:         RIGHT UPPER EXTREMITY:  Sensation intact to light touch in median, radial, ulnar and axillary nerve distributions  Palpable 2+ radial pulse, brisk capillary refill to all fingers, wwp  Intact epl fpl fdp edc wrist flexion/extension biceps triceps " deltoid    RIGHT SHOULDER:  Shoulder Inspection: no swelling, bruising, discoloration, or obvious deformity or asymmetry  Tender: anterior capsule, proximal bicep tendon, greater tuberosity, and proximal humerus  Non-tender: AC joint  Range of Motion:   Active:forward flexion 80 degrees, external rotation  45 degrees, internal rotation  hip pocket   Passive: forward flexion 140 degrees, external rotation  60 degrees  Strength: forward flexion 4/5, External rotation 4/5    Impingement: all grade 2 positive  Special tests: Empty Can: Positive    LEFT UPPER EXTREMITY:  Sensation intact to light touch in median, radial, ulnar and axillary nerve distributions  Palpable 2+ radial pulse, brisk capillary refill to all fingers, wwp  Intact epl fpl fdp edc wrist flexion/extension biceps triceps deltoid    LEFT SHOULDER:  Shoulder Inspection: no swelling, bruising, discoloration, or obvious deformity or asymmetry  Tender: greater tuberosity  Non-tender: AC joint  Range of Motion:   Active:forward flexion 160 degrees, external rotation  60 degrees  with audible crepitus.  Strength: forward flexion 4+/5, External rotation 4+/5    Impingement: equivocal.  Special tests: Empty Can: Negative      X-RAY INTERPRETATION: 3 views right  shoulder obtained 9/13/2024 were reviewed personally in clinic today with the patient. On my review, mild acromio-clavicular and gleno-humeral degenerative changes. Calcifications adjacent to the greater tuberosity. Sclerotic and cystic changes of the greater tuberosity. Slight humeral elevation under the acromion.           ASSESSMENT: Hemalatha Handy is a 84 year old female, right  -hand dominant with chronic right shoulder pain, rotator cuff tendinosis, suspect chronic right rotator cuff tear.      PLAN:   * Reviewed imaging studies with patient. Also, clinical exam findings. Consistent with some arthritis, likely chronic rotator cuff tendinosis with tear.  Discussed nonsurgical versus surgical  management, with surgery likely reverse total shoulder arthroplasty. Given no treatments, will hold off on any further surgery discussion until nonsurgical management has failed..    Treatment:    * Rest  * Activity modification - avoid activities that aggravate symptoms or started symptoms at onset.  * NSAIDS - regular use for inflammation, with food, as long as no contra-indications. Please discuss with pcp if needed.  * Ice twice daily to three times daily, 15-20 minutes at a time  * heat may be beneficial prior to exercising  * Physical Therapy for strengthening, stretching and range of motion exercises of rotator cuff and periscapular stabilization.  * Tylenol as needed for pain  * Injections: cortisone injections may be beneficial to help decrease swelling and inflammation within the shoulder or bursa, and decrease pain. With decreased pain, Physical Therapy and exercises will be more effective and efficient. Patient elected  to proceed.  * Return to clinic as needed.     Otoniel Perez M.D., M.S.  Dept. of Orthopaedic Surgery  [facility]     Large Joint Injection/Arthocentesis: R subacromial bursa    Date/Time: 9/13/2024 9:12 AM    Performed by: Otoniel Perez MD  Authorized by: Otoniel Perez MD    Indications:  Pain  Needle Size:  22 G  Guidance: landmark guided    Approach:  Posterolateral  Location:  Shoulder      Site:  R subacromial bursa  Medications:  80 mg triamcinolone 40 MG/ML; 4 mL BUPivacaine 0.25 %  Outcome:  Tolerated well, no immediate complications  Procedure discussed: discussed risks, benefits, and alternatives    Consent Given by:  Patient  Timeout: timeout called immediately prior to procedure    Prep: patient was prepped and draped in usual sterile fashion

## 2024-09-11 ENCOUNTER — TELEPHONE (OUTPATIENT)
Dept: FAMILY MEDICINE | Facility: CLINIC | Age: 84
End: 2024-09-11
Payer: COMMERCIAL

## 2024-09-11 NOTE — LETTER
September 11, 2024    To  Hemalatha Handy  [address] [place] [address]    Your team at [facility] cares about your health. We have reviewed your chart and based on our findings; we are making the following recommendations to better manage your health.     You are in particular need of attention regarding the following:     PREVENTATIVE VISIT: Annual Medicare Wellness:Schedule an Annual Medicare Wellness Exam. Please call your [facility] clinic to set up your appointment.    If you have already completed these items, please contact the clinic via phone or   MyChart so your care team can review and update your records. Thank you for   choosing [facility] for your healthcare needs. For any questions,   concerns, or to schedule an appointment please contact our clinic.    Healthy Regards,      Your [facility] Care Team

## 2024-09-11 NOTE — TELEPHONE ENCOUNTER
Patient Quality Outreach    Patient is due for the following:   Physical Annual Wellness Visit    Next Steps:   Schedule a Annual Wellness Visit    Type of outreach:    Sent letter.    Next Steps:  Reach out within 90 days via Phone.    Max number of attempts reached: Yes. Will try again in 90 days if patient still on fail list.    Questions for provider review:    None           Stephanie Mcfarland, [facility]  Chart routed to chart closed.

## 2024-09-13 ENCOUNTER — ANCILLARY PROCEDURE (OUTPATIENT)
Dept: GENERAL RADIOLOGY | Facility: CLINIC | Age: 84
End: 2024-09-13
Attending: ORTHOPAEDIC SURGERY
Payer: COMMERCIAL

## 2024-09-13 ENCOUNTER — OFFICE VISIT (OUTPATIENT)
Dept: ORTHOPEDICS | Facility: CLINIC | Age: 84
End: 2024-09-13
Payer: COMMERCIAL

## 2024-09-13 VITALS
WEIGHT: 139.4 LBS | RESPIRATION RATE: 18 BRPM | HEART RATE: 87 BPM | DIASTOLIC BLOOD PRESSURE: 73 MMHG | SYSTOLIC BLOOD PRESSURE: 120 MMHG | HEIGHT: 62 IN | BODY MASS INDEX: 25.65 KG/M2

## 2024-09-13 DIAGNOSIS — M25.511 CHRONIC RIGHT SHOULDER PAIN: Primary | ICD-10-CM

## 2024-09-13 DIAGNOSIS — M25.511 CHRONIC RIGHT SHOULDER PAIN: ICD-10-CM

## 2024-09-13 DIAGNOSIS — M75.101 ROTATOR CUFF SYNDROME OF RIGHT SHOULDER: ICD-10-CM

## 2024-09-13 DIAGNOSIS — G89.29 CHRONIC RIGHT SHOULDER PAIN: ICD-10-CM

## 2024-09-13 DIAGNOSIS — G89.29 CHRONIC RIGHT SHOULDER PAIN: Primary | ICD-10-CM

## 2024-09-13 PROCEDURE — 73030 X-RAY EXAM OF SHOULDER: CPT | Mod: TC | Performed by: RADIOLOGY

## 2024-09-13 PROCEDURE — 99213 OFFICE O/P EST LOW 20 MIN: CPT | Mod: 25 | Performed by: ORTHOPAEDIC SURGERY

## 2024-09-13 PROCEDURE — 20610 DRAIN/INJ JOINT/BURSA W/O US: CPT | Mod: RT | Performed by: ORTHOPAEDIC SURGERY

## 2024-09-13 RX ORDER — TRIAMCINOLONE ACETONIDE 40 MG/ML
80 INJECTION, SUSPENSION INTRA-ARTICULAR; INTRAMUSCULAR
Status: SHIPPED | OUTPATIENT
Start: 2024-09-13

## 2024-09-13 RX ORDER — BUPIVACAINE HYDROCHLORIDE 2.5 MG/ML
4 INJECTION, SOLUTION INFILTRATION; PERINEURAL
Status: SHIPPED | OUTPATIENT
Start: 2024-09-13

## 2024-09-13 RX ADMIN — BUPIVACAINE HYDROCHLORIDE 4 ML: 2.5 INJECTION, SOLUTION INFILTRATION; PERINEURAL at 09:12

## 2024-09-13 RX ADMIN — TRIAMCINOLONE ACETONIDE 80 MG: 40 INJECTION, SUSPENSION INTRA-ARTICULAR; INTRAMUSCULAR at 09:12

## 2024-09-13 ASSESSMENT — PAIN SCALES - GENERAL: PAINLEVEL: MODERATE PAIN (5)

## 2024-09-13 NOTE — LETTER
"9/13/2024      Hemalatha Handy  [address]  [address]  [place] [phone]      Dear Colleague,    Thank you for referring your patient, Hemalatha Handy, to the [facility]. Please see a copy of my visit note below.    CHIEF COMPLAINT:   Chief Complaint   Patient presents with     Right Shoulder - Pain     Chronic right shoulder pain for the past 1-2 years. She recalls lifting a bag and felt a pop in the right shoulder. This past June she fell and fractured her wrist and landed on the right shoulder, increasing her shoulder discomfort. She has difficulties with reaching overhead. She is resting and using NSAIDs and Tylenol. Pain today is 5/10, she is retired.            HISTORY:  Hemalatha Handy is a 84 year old female, right  -hand dominant, who is seen as self referral for right shoulder pain that started  1-2 years ago. She recalls lifting a bag of groceries and felt a \"pop\" in the right shoulder. This past June she fell and injured her wrist, landing on the right shoulder, increasing her shoulder discomfort. Pain with overhead reaching, lifting. Locates pain to side of the shoulder. Has pain at rest, worse with activities, lay on side.    Treatment has been rest, NSAIDS, tylenol.    Has numbness and tingling into the fingers.    She did have surgery on her right wrist with Dr Quesada, [facility], 6/19/2024. She's seeing him again on 9/24/2024.    Onset: 1-2 years ago, then 6/2024 after a fall.  Symptoms have been worsening since that time.  Aggrevated by: reaching, lifting  Relieved by: rest, over the counter medications   Present symptoms: pain with ADL's (dressing),  pain with overhead activities,  pain reaching behind back,  pain reaching out or away from body (flexion/ abduction),  positional night pain,  pain lifting,  weakness with lifting,  Pain location: lateral shoulder, deltoid and upper arm, and located throughout the shoulder joint/diffuse  Pain severity: " 5/10  Pain quality: dull and aching  Frequency of symptoms: are constant     Patient has tried:     NSAIDS: Yes      Acetaminophen: Yes     Opioids: No     Physical Therapy: No      Activity modification: Yes      Bracing: No      Injections: No       Ice: No      Assistive device:  No     Topicals: No     Usual level of work activity: retired    Other PMH:  has a past medical history of Actinic keratosis, Anxiety, Basal cell carcinoma (pt unsure), Cervical cancer (H) (early 1960's), Colorectal polyps (01/01/2008), Gastroesophageal reflux disease without esophagitis, HLD (hyperlipidemia), Hypertension, and Vulvar cancer (H) (11/2022).    She has no past medical history of Acne vulgaris, Allergies, Eczema, Heart valve disorder, Malignant melanoma nos, Pacemaker, Photosensitive contact dermatitis, Psoriasis, Skin cancer, Squamous cell carcinoma, Type II or unspecified type diabetes mellitus without mention of complication, not stated as uncontrolled, Unspecified asthma(493.90), or Urticaria.  Patient Active Problem List   Diagnosis     Anxiety     GERD (gastroesophageal reflux disease)     Lateral meniscus tear, left, initial encounter     Popliteal cyst, left     Essential hypertension with goal blood pressure less than 140/90     Vulvar cancer (H)       Surgical Hx:  has a past surgical history that includes colonoscopy (09/18/2008); ENT surgery (07/12/2012); and [employer]ise vulva wide local (Left, 1/11/2023).    Medications:   Current Outpatient Medications:      ALPRAZolam (XANAX) 0.25 MG tablet, TAKE 1 TABLET(0.25 MG) BY MOUTH THREE TIMES DAILY AS NEEDED FOR ANXIETY, Disp: 30 tablet, Rfl: 0     chlorhexidine (PERIDEX) 0.12 % solution, SWISH AND SPIT 15 ML BY MOUTH TWICE DAILY AS NEEDED, Disp: 473 mL, Rfl: 1     citalopram (CELEXA) 20 MG tablet, TAKE 1 TABLET(20 MG) BY MOUTH DAILY, Disp: 90 tablet, Rfl: 0     lisinopril (ZESTRIL) 20 MG tablet, Take 1 tablet (20 mg) by mouth daily., Disp: 100 tablet, Rfl: 1     MOTRIN  "800 MG OR TABS, Take by mouth every 6 hours as needed, Disp: , Rfl:      multivitamin w/minerals (THERA-VIT-M) tablet, Take 1 tablet by mouth daily, Disp: , Rfl:     Current Facility-Administered Medications:      BUPivacaine (MARCAINE) 0.25 % injection 4 mL, 4 mL, , , Otoniel Perez MD, 4 mL at 02/07/24 0906     methylPREDNISolone (DEPO-Medrol) injection 80 mg, 80 mg, , , Otoniel Perez MD, 80 mg at 02/07/24 0906    Allergies:   Allergies   Allergen Reactions     Amoxicillin Hives     Asa [Aspirin]      Conj Estrog-Medroxyprogest Ace      Evista [Raloxifene Hydrochloride]      Raloxifene Hives and Rash       Social Hx: retired.   reports that she quit smoking about 19 years ago. Her smoking use included cigarettes. She started smoking about 64 years ago. She has a 45 pack-year smoking history. She has been exposed to tobacco smoke. She has never used smokeless tobacco. She reports current alcohol use. She reports that she does not use drugs.    Family Hx: family history includes Cancer in her son; Hypertension in her brother and mother..    REVIEW OF SYSTEMS: 10 point ROS neg other than the symptoms noted above in the HPI and PMH. Notables include  CONSTITUTIONAL:NEGATIVE for fever, chills, change in weight  INTEGUMENTARY/SKIN: NEGATIVE for worrisome rashes, moles or lesions  MUSCULOSKELETAL:See HPI above  NEURO: NEGATIVE for weakness, dizziness or paresthesias    PHYSICAL EXAM:  /73   Pulse 87   Resp 18   Ht 1.575 m (5' 2\")   Wt 63.2 kg (139 lb 6.4 oz)   LMP  (LMP Unknown)   BMI 25.50 kg/m     GENERAL APPEARANCE: healthy, alert, no distress  SKIN: no suspicious lesions or rashes  NEURO: Normal strength and tone, mentation intact and speech normal  PSYCH:  mentation appears normal and affect normal, not anxious  RESPIRATORY: No increased work of breathing.  VASCULAR: Radial pulses 2+ and brisk cappillary refill   LYMPH: no palpable axillary lymphadenopathy or cervical neck " lymphadenopathy.      MUSCULOSKELETAL:         RIGHT UPPER EXTREMITY:  Sensation intact to light touch in median, radial, ulnar and axillary nerve distributions  Palpable 2+ radial pulse, brisk capillary refill to all fingers, wwp  Intact epl fpl fdp edc wrist flexion/extension biceps triceps deltoid    RIGHT SHOULDER:  Shoulder Inspection: no swelling, bruising, discoloration, or obvious deformity or asymmetry  Tender: anterior capsule, proximal bicep tendon, greater tuberosity, and proximal humerus  Non-tender: AC joint  Range of Motion:   Active:forward flexion 80 degrees, external rotation  45 degrees, internal rotation  hip pocket   Passive: forward flexion 140 degrees, external rotation  60 degrees  Strength: forward flexion 4/5, External rotation 4/5    Impingement: all grade 2 positive  Special tests: Empty Can: Positive    LEFT UPPER EXTREMITY:  Sensation intact to light touch in median, radial, ulnar and axillary nerve distributions  Palpable 2+ radial pulse, brisk capillary refill to all fingers, wwp  Intact epl fpl fdp edc wrist flexion/extension biceps triceps deltoid    LEFT SHOULDER:  Shoulder Inspection: no swelling, bruising, discoloration, or obvious deformity or asymmetry  Tender: greater tuberosity  Non-tender: AC joint  Range of Motion:   Active:forward flexion 160 degrees, external rotation  60 degrees  with audible crepitus.  Strength: forward flexion 4+/5, External rotation 4+/5    Impingement: equivocal.  Special tests: Empty Can: Negative      X-RAY INTERPRETATION: 3 views right  shoulder obtained 9/13/2024 were reviewed personally in clinic today with the patient. On my review, mild acromio-clavicular and gleno-humeral degenerative changes. Calcifications adjacent to the greater tuberosity. Sclerotic and cystic changes of the greater tuberosity. Slight humeral elevation under the acromion.           ASSESSMENT: Hemalatha Handy is a 84 year old female, right  -hand dominant with chronic  right shoulder pain, rotator cuff tendinosis, suspect chronic right rotator cuff tear.      PLAN:   * Reviewed imaging studies with patient. Also, clinical exam findings. Consistent with some arthritis, likely chronic rotator cuff tendinosis with tear.  Discussed nonsurgical versus surgical management, with surgery likely reverse total shoulder arthroplasty. Given no treatments, will hold off on any further surgery discussion until nonsurgical management has failed..    Treatment:    * Rest  * Activity modification - avoid activities that aggravate symptoms or started symptoms at onset.  * NSAIDS - regular use for inflammation, with food, as long as no contra-indications. Please discuss with pcp if needed.  * Ice twice daily to three times daily, 15-20 minutes at a time  * heat may be beneficial prior to exercising  * Physical Therapy for strengthening, stretching and range of motion exercises of rotator cuff and periscapular stabilization.  * Tylenol as needed for pain  * Injections: cortisone injections may be beneficial to help decrease swelling and inflammation within the shoulder or bursa, and decrease pain. With decreased pain, Physical Therapy and exercises will be more effective and efficient. Patient elected  to proceed.  * Return to clinic as needed.     Otoniel Perez M.D., M.S.  Dept. of Orthopaedic Surgery  [facility]     Large Joint Injection/Arthocentesis: R subacromial bursa    Date/Time: 9/13/2024 9:12 AM    Performed by: Otoniel Perez MD  Authorized by: Otoniel Perez MD    Indications:  Pain  Needle Size:  22 G  Guidance: landmark guided    Approach:  Posterolateral  Location:  Shoulder      Site:  R subacromial bursa  Medications:  80 mg triamcinolone 40 MG/ML; 4 mL BUPivacaine 0.25 %  Outcome:  Tolerated well, no immediate complications  Procedure discussed: discussed risks, benefits, and alternatives    Consent Given by:  Patient  Timeout: timeout called immediately  prior to procedure    Prep: patient was prepped and draped in usual sterile fashion          Again, thank you for allowing me to participate in the care of your patient.        Sincerely,        Otoniel Perez MD

## 2024-09-17 ENCOUNTER — THERAPY VISIT (OUTPATIENT)
Dept: PHYSICAL THERAPY | Facility: CLINIC | Age: 84
End: 2024-09-17
Attending: ORTHOPAEDIC SURGERY
Payer: COMMERCIAL

## 2024-09-17 DIAGNOSIS — M25.511 CHRONIC RIGHT SHOULDER PAIN: ICD-10-CM

## 2024-09-17 DIAGNOSIS — M75.101 ROTATOR CUFF SYNDROME OF RIGHT SHOULDER: ICD-10-CM

## 2024-09-17 DIAGNOSIS — G89.29 CHRONIC RIGHT SHOULDER PAIN: ICD-10-CM

## 2024-09-17 PROCEDURE — 97110 THERAPEUTIC EXERCISES: CPT | Mod: GP | Performed by: PHYSICAL THERAPIST

## 2024-09-17 PROCEDURE — 97161 PT EVAL LOW COMPLEX 20 MIN: CPT | Mod: GP | Performed by: PHYSICAL THERAPIST

## 2024-09-17 ASSESSMENT — ACTIVITIES OF DAILY LIVING (ADL)
PUTTING_ON_YOUR_PANTS: 8
REACHING_FOR_SOMETHING_ON_A_HIGH_SHELF: 8
WHEN_LYING_ON_THE_INVOLVED_SIDE: 4
PLEASE_INDICATE_YOR_PRIMARY_REASON_FOR_REFERRAL_TO_THERAPY:: SHOULDER
AT_ITS_WORST?: 4
TOUCHING_THE_BACK_OF_YOUR_NECK: 8
PUSHING_WITH_THE_INVOLVED_ARM: 8
WASHING_YOUR_HAIR?: 8
PUTTING_ON_AN_UNDERSHIRT_OR_A_PULLOVER_SWEATER: 8
REMOVING_SOMETHING_FROM_YOUR_BACK_POCKET: 8
PLACING_AN_OBJECT_ON_A_HIGH_SHELF: 8
CARRYING_A_HEAVY_OBJECT_OF_10_POUNDS: 8
PUTTING_ON_A_SHIRT_THAT_BUTTONS_DOWN_THE_FRONT: 8

## 2024-09-17 NOTE — PROGRESS NOTES
PHYSICAL THERAPY EVALUATION  Type of Visit: Evaluation        Fall Risk Screen:  Fall screen completed by: PT  Have you fallen 2 or more times in the past year?: Yes  Have you fallen and had an injury in the past year?: No  Timed Up and Go score (seconds): 10  Is patient a fall risk?: No    Subjective Was lifting a bag about 1.5 years ago and felt a pop. In June then fell on the right shoulder. Gets pain in the side and the back of the shoulder and the back of the upper arm. Reaching overhead or behind the back is painful. Getting dressed is also painful. Had a cortisone injection which hasn't helped. Heat helps a little bit.      Presenting condition or subjective complaint:    Date of onset: 03/17/23    Relevant medical history:     Dates & types of surgery:      Prior diagnostic imaging/testing results: Other     Prior therapy history for the same diagnosis, illness or injury: No      Prior Level of Function  Transfers: Independent  Ambulation: Independent  ADL: Independent    Living Environment  Social support: Alone   Type of home: Apartment/condo   Stairs to enter the home: Yes 4 Is there a railing: Yes     Ramp: No   Stairs inside the home: No       Help at home: None  Equipment owned:       Employment: No    Hobbies/Interests: reading    Patient goals for therapy:       Objective   SHOULDER EVALUATION  PAIN: Pain Level at Rest: 2/10  Pain Level with Use: 8/10  POSTURE: Sitting Posture: Rounded shoulders, Forward head  ROM:   (Degrees) Left AROM Left PROM Right AROM  Right PROM   Shoulder Flexion 150  110    Shoulder Extension       Shoulder Abduction 150  75    Shoulder Adduction       Shoulder Internal Rotation T10  L4    Shoulder External Rotation  90  90 with pain   Shoulder Horizontal Abduction       Shoulder Horizontal Adduction       Shoulder Flexion ER       Shoulder Flexion IR       Elbow Extension       Elbow Flexion         STRENGTH:   Pain: - none + mild ++ moderate +++ severe  Strength Scale:  0-5/5 Left Right   Shoulder Flexion 5 4   Shoulder Extension     Shoulder Abduction 5 4-   Shoulder Adduction     Shoulder Internal Rotation 5 4+   Shoulder External Rotation 5 4   Shoulder Horizontal Abduction     Shoulder Horizontal Adduction     Elbow Flexion     Elbow Extension     Mid Trap     Lower Trap     Rhomboid     Serratus Anterior       SPECIAL TESTS:    Left Right   Impingement     Neer's  Positive   Hawkin's-Shilo  Positive   Coracoid Impingement     Internal impingement     Labral     Anterior Slide     [place]'s  Negative    Crank     Instability     Apprehension (anterior)     Relocation (anterior)     Anterior Load & Shift     Posterior Load & Shift     Posterior instability (with 90 degrees flex)     Multi-Directional Instability      Sulcus     Biceps      Speed's     Rotator Cuff Tear     Drop Arm  Negative    Belly Press  Negative    Lift off   Negative      PALPATION:   + Tenderness At Location Left Right   Clavicle  -   Sternoclavicular  -   Acromioclavicular  -   Biceps  -   Triceps  -   Supraspinatus  +   Infraspinatus  +   Teres minor  +   Subscapularis  -   Deltoid     Levator     Rhomboids     Upper trap     Incisional     Bicipital groove  -     JOINT MOBILITY: Hypomobility of glenohumeral joint    Assessment & Plan   CLINICAL IMPRESSIONS  Medical Diagnosis: Chronic right shoulder pain / Rotator cuff syndrome of right shoulder    Treatment Diagnosis: Chronic right shoulder pain   Impression/Assessment: Patient is a 84 year old female with right shoulder complaints.  The following significant findings have been identified: Pain, Decreased ROM/flexibility, Decreased joint mobility, Decreased strength, and Decreased activity tolerance. These impairments interfere with their ability to perform self care tasks, recreational activities, and household chores as compared to previous level of function.     Clinical Decision Making (Complexity):  Clinical Presentation:  Stable/Uncomplicated  Clinical Presentation Rationale: based on medical and personal factors listed in PT evaluation  Clinical Decision Making (Complexity): Low complexity    PLAN OF CARE  Treatment Interventions:  Modalities: Cryotherapy, Hot Pack  Interventions: Manual Therapy, Neuromuscular Re-education, Therapeutic Activity, Therapeutic Exercise, Self-Care/Home Management    Long Term Goals     PT Goal 1  Goal Identifier: Overhead reaching  Goal Description: Pt will be able to reach overhead to grab items from a cupboard with a minimal increase in pain 1-2/10  Target Date: 11/12/24  PT Goal 2  Goal Identifier: Reaching behind back  Goal Description: Pt will be able to reach behind their back in order to get dressed with a minimal increase in pain 1-2/10  Target Date: 11/12/24      Frequency of Treatment: 1 time per week  Duration of Treatment: 8 weeks    Recommended Referrals to Other Professionals:   Education Assessment:   Learner/Method: Patient;No Barriers to Learning    Risks and benefits of evaluation/treatment have been explained.   Patient/Family/caregiver agrees with Plan of Care.     Evaluation Time:     PT Eval, Low Complexity Minutes (81950): 19       Signing Clinician: Henry Hare, PT        [facility]                                                                                   OUTPATIENT PHYSICAL THERAPY      PLAN OF TREATMENT FOR OUTPATIENT REHABILITATION   Patient's Last Name, First Name, LUISHemalatha Cherry YOB: 1940   Provider's Name   [facility]   Medical Record No.  [id number]     Onset Date: 03/17/23  Start of Care Date: 09/17/24     Medical Diagnosis:  Chronic right shoulder pain / Rotator cuff syndrome of right shoulder      PT Treatment Diagnosis:  Chronic right shoulder pain Plan of Treatment  Frequency/Duration: 1 time per week/ 8 weeks    Certification date from 09/17/24 to 11/12/24         See  note for plan of treatment details and functional goals     Henry Hare, PT                         I CERTIFY THE NEED FOR THESE SERVICES FURNISHED UNDER        THIS PLAN OF TREATMENT AND WHILE UNDER MY CARE     (Physician attestation of this document indicates review and certification of the therapy plan).              Referring Provider:  Otoniel Perez    Initial Assessment  See Epic Evaluation- Start of Care Date: 09/17/24

## 2024-09-23 ENCOUNTER — OFFICE VISIT (OUTPATIENT)
Dept: FAMILY MEDICINE | Facility: CLINIC | Age: 84
End: 2024-09-23
Payer: COMMERCIAL

## 2024-09-23 VITALS
HEIGHT: 61 IN | TEMPERATURE: 97.6 F | RESPIRATION RATE: 16 BRPM | HEART RATE: 96 BPM | BODY MASS INDEX: 26.01 KG/M2 | DIASTOLIC BLOOD PRESSURE: 67 MMHG | SYSTOLIC BLOOD PRESSURE: 135 MMHG | WEIGHT: 137.8 LBS | OXYGEN SATURATION: 96 %

## 2024-09-23 DIAGNOSIS — E55.9 VITAMIN D DEFICIENCY DISEASE: ICD-10-CM

## 2024-09-23 DIAGNOSIS — R73.01 IMPAIRED FASTING GLUCOSE: ICD-10-CM

## 2024-09-23 DIAGNOSIS — E78.5 HYPERLIPIDEMIA LDL GOAL <100: ICD-10-CM

## 2024-09-23 DIAGNOSIS — R63.4 WEIGHT LOSS: ICD-10-CM

## 2024-09-23 DIAGNOSIS — R91.1 LUNG NODULE: ICD-10-CM

## 2024-09-23 DIAGNOSIS — F41.9 ANXIETY: ICD-10-CM

## 2024-09-23 DIAGNOSIS — R53.83 FATIGUE, UNSPECIFIED TYPE: Primary | ICD-10-CM

## 2024-09-23 LAB
BASOPHILS # BLD AUTO: 0 10E3/UL (ref 0–0.2)
BASOPHILS NFR BLD AUTO: 0 %
EOSINOPHIL # BLD AUTO: 0.1 10E3/UL (ref 0–0.7)
EOSINOPHIL NFR BLD AUTO: 1 %
ERYTHROCYTE [DISTWIDTH] IN BLOOD BY AUTOMATED COUNT: 12.7 % (ref 10–15)
EST. AVERAGE GLUCOSE BLD GHB EST-MCNC: 105 MG/DL
HBA1C MFR BLD: 5.3 % (ref 0–5.6)
HCT VFR BLD AUTO: 34.5 % (ref 35–47)
HGB BLD-MCNC: 10.9 G/DL (ref 11.7–15.7)
IMM GRANULOCYTES # BLD: 0 10E3/UL
IMM GRANULOCYTES NFR BLD: 0 %
LYMPHOCYTES # BLD AUTO: 1.3 10E3/UL (ref 0.8–5.3)
LYMPHOCYTES NFR BLD AUTO: 18 %
MCH RBC QN AUTO: 30.9 PG (ref 26.5–33)
MCHC RBC AUTO-ENTMCNC: 31.6 G/DL (ref 31.5–36.5)
MCV RBC AUTO: 98 FL (ref 78–100)
MONOCYTES # BLD AUTO: 0.8 10E3/UL (ref 0–1.3)
MONOCYTES NFR BLD AUTO: 11 %
NEUTROPHILS # BLD AUTO: 5.3 10E3/UL (ref 1.6–8.3)
NEUTROPHILS NFR BLD AUTO: 70 %
PLATELET # BLD AUTO: 317 10E3/UL (ref 150–450)
RBC # BLD AUTO: 3.53 10E6/UL (ref 3.8–5.2)
WBC # BLD AUTO: 7.6 10E3/UL (ref 4–11)

## 2024-09-23 PROCEDURE — 82607 VITAMIN B-12: CPT | Performed by: FAMILY MEDICINE

## 2024-09-23 PROCEDURE — 82306 VITAMIN D 25 HYDROXY: CPT | Performed by: FAMILY MEDICINE

## 2024-09-23 PROCEDURE — 84443 ASSAY THYROID STIM HORMONE: CPT | Performed by: FAMILY MEDICINE

## 2024-09-23 PROCEDURE — 85025 COMPLETE CBC W/AUTO DIFF WBC: CPT | Performed by: FAMILY MEDICINE

## 2024-09-23 PROCEDURE — 80053 COMPREHEN METABOLIC PANEL: CPT | Performed by: FAMILY MEDICINE

## 2024-09-23 PROCEDURE — 36415 COLL VENOUS BLD VENIPUNCTURE: CPT | Performed by: FAMILY MEDICINE

## 2024-09-23 PROCEDURE — 83036 HEMOGLOBIN GLYCOSYLATED A1C: CPT | Performed by: FAMILY MEDICINE

## 2024-09-23 PROCEDURE — 99214 OFFICE O/P EST MOD 30 MIN: CPT | Performed by: FAMILY MEDICINE

## 2024-09-23 PROCEDURE — 80061 LIPID PANEL: CPT | Performed by: FAMILY MEDICINE

## 2024-09-23 ASSESSMENT — PAIN SCALES - GENERAL: PAINLEVEL: MILD PAIN (2)

## 2024-09-23 NOTE — PROGRESS NOTES
"      Dorian Penny is a 84 year old, presenting for the following health issues:  Weight Check        9/23/2024     2:21 PM   Additional Questions   Roomed by ABHILASH MARSHALL   Accompanied by self   Patient states she is concerned because of her weight loss and she is just overall not feeling well  History of Present Illness       Reason for visit:  Wedight loss and not feeling well   She is taking medications regularly.          Down 20 lb since broke wrist in mid June  Had surgery for wrist fracture    Stayed with daughter for a while after surgery     Not feeling good    Not much appetite    Taking protein drink    Tired    Eating less than used to     No pain with eating    Food goes down okay    Patient okay with weight     Usual adult wt 140    By our chart, down 15 lb since last October    No nausea or vomiting    Sometimes better after bm    Not feeling constipated    No bleeding    Staying hydrated    Some nasal drainage    Not sleeping well    Xanax helps some    Patient declines CT scan      Objective    /67 (BP Location: Right arm, Cuff Size: Adult Regular)   Pulse 106   Temp 97.6  F (36.4  C) (Temporal)   Resp 16   Ht 1.552 m (5' 1.1\")   Wt 62.5 kg (137 lb 12.8 oz)   LMP  (LMP Unknown)   SpO2 96%   BMI 25.95 kg/m    Body mass index is 25.95 kg/m .  Physical Exam  Constitutional:       Appearance: She is well-developed.   HENT:      Head: Normocephalic and atraumatic.   Eyes:      Conjunctiva/sclera: Conjunctivae normal.   Neck:      Vascular: No carotid bruit.   Cardiovascular:      Rate and Rhythm: Normal rate and regular rhythm.      Heart sounds: Normal heart sounds.   Pulmonary:      Effort: Pulmonary effort is normal. No respiratory distress.      Breath sounds: Normal breath sounds.   Abdominal:      General: There is no distension.      Palpations: Abdomen is soft. There is no mass.      Tenderness: There is no abdominal tenderness. There is no right CVA tenderness, left CVA " tenderness, guarding or rebound.   Neurological:      Mental Status: She is alert and oriented to person, place, and time.      No edema    Radials symmetric        ASSESSMENT / PLAN:  (R53.83) Fatigue, unspecified type  (primary encounter diagnosis)  Comment: check labs   Plan: CBC with Platelets & Differential, TSH with         free T4 reflex, Vitamin B12             (E78.5) Hyperlipidemia LDL goal <100  Comment: labs pending   Plan: Lipid panel reflex to direct LDL Non-fasting,         Comprehensive metabolic panel             (R73.01) Impaired fasting glucose  Comment: check   Plan: Hemoglobin A1c             (E55.9) Vitamin D deficiency disease  Comment: check   Plan: Vitamin D Deficiency             (R63.4) Weight loss  Comment: of note wt loss is likely explained by decreased food intake.  However, if wt continues to decline going forward, we need to do further eval.   Plan: as above     (F41.9) Anxiety  Comment: patient has the nightly alprazolam  Plan: as above. Chronic med for patient.     (R91.1) Lung nodule  Comment:  patient declines any follow up CT  Plan: as above       I reviewed the patient's medications, allergies, medical history, family history, and social history.    Javier Jones MD              Signed Electronically by: Javier Jones MD

## 2024-09-23 NOTE — PATIENT INSTRUCTIONS
We will send you lab results    Maintain good calorie and protein intake    If weight loss continues, let us know    Try to stay as active as possible

## 2024-09-23 NOTE — LETTER
September 24, 2024      Hemalatha Handy  [address] [facility]  [address] [place] [id number]        Dear Hemalatha:    We are writing to inform you of your test results.    The high urea nitrogen means you are dehydrated.  Increase fluid intake.     The red blood count ( hemoglobin ) is low. This is contributing to your fatigue.     It does not look like iron deficiency.     Try to eat a healthy balanced diet.  Get plenty of calories and protein.      See us back in one month to recheck weight and labs.     Other labs are okay.     Resulted Orders   Lipid panel reflex to direct LDL Non-fasting   Result Value Ref Range    Cholesterol 196 <200 mg/dL    Triglycerides 117 <150 mg/dL    Direct Measure HDL 65 >=50 mg/dL    LDL Cholesterol Calculated 108 (H) <100 mg/dL    Non HDL Cholesterol 131 (H) <130 mg/dL    Patient Fasting > 8hrs? No     Narrative    Cholesterol  Desirable: < 200 mg/dL  Borderline High: 200 - 239 mg/dL  High: >= 240 mg/dL    Triglycerides  Normal: < 150 mg/dL  Borderline High: 150 - 199 mg/dL  High: 200-499 mg/dL  Very High: >= 500 mg/dL    Direct Measure HDL  Female: >= 50 mg/dL   Male: >= 40 mg/dL    LDL Cholesterol  Desirable: < 100 mg/dL  Above Desirable: 100 - 129 mg/dL   Borderline High: 130 - 159 mg/dL   High:  160 - 189 mg/dL   Very High: >= 190 mg/dL    Non HDL Cholesterol  Desirable: < 130 mg/dL  Above Desirable: 130 - 159 mg/dL  Borderline High: 160 - 189 mg/dL  High: 190 - 219 mg/dL  Very High: >= 220 mg/dL   Comprehensive metabolic panel   Result Value Ref Range    Sodium 139 135 - 145 mmol/L    Potassium 4.7 3.4 - 5.3 mmol/L    Carbon Dioxide (CO2) 24 22 - 29 mmol/L    Anion Gap 12 7 - 15 mmol/L    Urea Nitrogen 32.9 (H) 8.0 - 23.0 mg/dL    Creatinine 0.66 0.51 - 0.95 mg/dL    GFR Estimate 86 >60 mL/min/1.73m2      Comment:      eGFR calculated using 2021 CKD-EPI equation.    Calcium 9.4 8.8 - 10.4 mg/dL      Comment:      Reference intervals for this test were updated on 7/16/2024 to  reflect our healthy population more accurately. There may be differences in the flagging of prior results with similar values performed with this method. Those prior results can be interpreted in the context of the updated reference intervals.    Chloride 103 98 - 107 mmol/L    Glucose 90 70 - 99 mg/dL    Alkaline Phosphatase 90 40 - 150 U/L    AST 21 0 - 45 U/L    ALT 16 0 - 50 U/L    Protein Total 7.1 6.4 - 8.3 g/dL    Albumin 4.4 3.5 - 5.2 g/dL    Bilirubin Total 0.2 <=1.2 mg/dL    Patient Fasting > 8hrs? No    Hemoglobin A1c   Result Value Ref Range    Estimated Average Glucose 105 <117 mg/dL    Hemoglobin A1C 5.3 0.0 - 5.6 %      Comment:      Normal <5.7%   Prediabetes 5.7-6.4%    Diabetes 6.5% or higher     Note: Adopted from ADA consensus guidelines.   TSH with free T4 reflex   Result Value Ref Range    TSH 1.13 0.30 - 4.20 uIU/mL   Vitamin D Deficiency   Result Value Ref Range    Vitamin D, Total (25-Hydroxy) 35 20 - 50 ng/mL      Comment:      optimum levels    Narrative    Season, race, dietary intake, and treatment affect the concentration of 25-hydroxy-Vitamin D. Values may decrease during winter months and increase during summer months.    Vitamin D determination is routinely performed by an immunoassay specific for 25 hydroxyvitamin D3.  If an individual is on vitamin D2(ergocalciferol) supplementation, please specify 25 OH vitamin D2 and D3 level determination by LCMSMS test VITD23.     Vitamin B12   Result Value Ref Range    Vitamin B12 280 232 - 1,245 pg/mL   CBC with platelets and differential   Result Value Ref Range    WBC Count 7.6 4.0 - 11.0 10e3/uL    RBC Count 3.53 (L) 3.80 - 5.20 10e6/uL    Hemoglobin 10.9 (L) 11.7 - 15.7 g/dL    Hematocrit 34.5 (L) 35.0 - 47.0 %    MCV 98 78 - 100 fL    MCH 30.9 26.5 - 33.0 pg    MCHC 31.6 31.5 - 36.5 g/dL    RDW 12.7 10.0 - 15.0 %    Platelet Count 317 150 - 450 10e3/uL    % Neutrophils 70 %    % Lymphocytes 18 %    % Monocytes 11 %    % Eosinophils 1 %     % Basophils 0 %    % Immature Granulocytes 0 %    Absolute Neutrophils 5.3 1.6 - 8.3 10e3/uL    Absolute Lymphocytes 1.3 0.8 - 5.3 10e3/uL    Absolute Monocytes 0.8 0.0 - 1.3 10e3/uL    Absolute Eosinophils 0.1 0.0 - 0.7 10e3/uL    Absolute Basophils 0.0 0.0 - 0.2 10e3/uL    Absolute Immature Granulocytes 0.0 <=0.4 10e3/uL     If you have any questions or concerns, please call the clinic at the number listed above.     Sincerely,      Javier Jones MD/bk

## 2024-09-24 LAB
ALBUMIN SERPL BCG-MCNC: 4.4 G/DL (ref 3.5–5.2)
ALP SERPL-CCNC: 90 U/L (ref 40–150)
ALT SERPL W P-5'-P-CCNC: 16 U/L (ref 0–50)
ANION GAP SERPL CALCULATED.3IONS-SCNC: 12 MMOL/L (ref 7–15)
AST SERPL W P-5'-P-CCNC: 21 U/L (ref 0–45)
BILIRUB SERPL-MCNC: 0.2 MG/DL
BUN SERPL-MCNC: 32.9 MG/DL (ref 8–23)
CALCIUM SERPL-MCNC: 9.4 MG/DL (ref 8.8–10.4)
CHLORIDE SERPL-SCNC: 103 MMOL/L (ref 98–107)
CHOLEST SERPL-MCNC: 196 MG/DL
CREAT SERPL-MCNC: 0.66 MG/DL (ref 0.51–0.95)
EGFRCR SERPLBLD CKD-EPI 2021: 86 ML/MIN/1.73M2
FASTING STATUS PATIENT QL REPORTED: NO
FASTING STATUS PATIENT QL REPORTED: NO
GLUCOSE SERPL-MCNC: 90 MG/DL (ref 70–99)
HCO3 SERPL-SCNC: 24 MMOL/L (ref 22–29)
HDLC SERPL-MCNC: 65 MG/DL
LDLC SERPL CALC-MCNC: 108 MG/DL
NONHDLC SERPL-MCNC: 131 MG/DL
POTASSIUM SERPL-SCNC: 4.7 MMOL/L (ref 3.4–5.3)
PROT SERPL-MCNC: 7.1 G/DL (ref 6.4–8.3)
SODIUM SERPL-SCNC: 139 MMOL/L (ref 135–145)
TRIGL SERPL-MCNC: 117 MG/DL
TSH SERPL DL<=0.005 MIU/L-ACNC: 1.13 UIU/ML (ref 0.3–4.2)
VIT B12 SERPL-MCNC: 280 PG/ML (ref 232–1245)
VIT D+METAB SERPL-MCNC: 35 NG/ML (ref 20–50)

## 2024-09-24 NOTE — RESULT ENCOUNTER NOTE
The high urea nitrogen means you are dehydrated.  Increase fluid intake.    The red blood count ( hemoglobin ) is low. This is contributing to your fatigue.    It does not look like iron deficiency.    Try to eat a healthy balanced diet.  Get plenty of calories and protein.      See us back in one month to recheck weight and labs.    Other labs are okay.    Javier Jones MD      Please mail letter and results to patient   Thanks  Javier Jones MD

## 2024-10-03 ENCOUNTER — VIRTUAL VISIT (OUTPATIENT)
Dept: FAMILY MEDICINE | Facility: CLINIC | Age: 84
End: 2024-10-03
Payer: COMMERCIAL

## 2024-10-03 ENCOUNTER — LAB (OUTPATIENT)
Dept: LAB | Facility: CLINIC | Age: 84
End: 2024-10-03
Payer: COMMERCIAL

## 2024-10-03 DIAGNOSIS — R53.83 FATIGUE, UNSPECIFIED TYPE: ICD-10-CM

## 2024-10-03 DIAGNOSIS — E55.9 VITAMIN D DEFICIENCY DISEASE: ICD-10-CM

## 2024-10-03 DIAGNOSIS — D64.9 ANEMIA, UNSPECIFIED TYPE: Primary | ICD-10-CM

## 2024-10-03 DIAGNOSIS — D64.9 ANEMIA, UNSPECIFIED TYPE: ICD-10-CM

## 2024-10-03 LAB
ERYTHROCYTE [SEDIMENTATION RATE] IN BLOOD BY WESTERGREN METHOD: 18 MM/HR (ref 0–30)
FOLATE SERPL-MCNC: 20.6 NG/ML (ref 4.6–34.8)

## 2024-10-03 PROCEDURE — 99442 PR PHYSICIAN TELEPHONE EVALUATION 11-20 MIN: CPT | Mod: 93 | Performed by: FAMILY MEDICINE

## 2024-10-03 PROCEDURE — 86140 C-REACTIVE PROTEIN: CPT

## 2024-10-03 PROCEDURE — 83540 ASSAY OF IRON: CPT

## 2024-10-03 PROCEDURE — 85652 RBC SED RATE AUTOMATED: CPT

## 2024-10-03 PROCEDURE — 36415 COLL VENOUS BLD VENIPUNCTURE: CPT

## 2024-10-03 PROCEDURE — 82746 ASSAY OF FOLIC ACID SERUM: CPT

## 2024-10-03 PROCEDURE — 82728 ASSAY OF FERRITIN: CPT

## 2024-10-03 PROCEDURE — 82274 ASSAY TEST FOR BLOOD FECAL: CPT

## 2024-10-03 PROCEDURE — 83550 IRON BINDING TEST: CPT

## 2024-10-03 NOTE — PATIENT INSTRUCTIONS
Start an over the counter vitamin B12 pill daily ( or B complex )    Stay well hydrated    We will notify you of these new lab results after you do the lab only appointment     Return the stool FIT test

## 2024-10-03 NOTE — LETTER
October 8, 2024      Hemalatha Handy  [address] [address]        Dear ,    We are writing to inform you of your test results.    Here are the lab results I called you about.     The positive FIT test means you are losing blood in the stool.  This is likely causing the low hemoglobin ( anemia) and this is why you feel tired.     I advise doing the upper and lower scope exams ( EGD and colonoscopy).      Think about this for a few days and I will try to call you back.     In the meantime, you could start taking one over the counter iron pill daily ( this may cause constipation ).     Resulted Orders   Iron and iron binding capacity   Result Value Ref Range    Iron 30 (L) 37 - 145 ug/dL    Iron Binding Capacity 506 (H) 240 - 430 ug/dL    Iron Sat Index 6 (L) 15 - 46 %   Ferritin   Result Value Ref Range    Ferritin 12 11 - 328 ng/mL   Folate   Result Value Ref Range    Folic Acid 20.6 4.6 - 34.8 ng/mL   ESR: Erythrocyte sedimentation rate   Result Value Ref Range    Erythrocyte Sedimentation Rate 18 0 - 30 mm/hr   CRP, inflammation   Result Value Ref Range    CRP Inflammation <3.00 <5.00 mg/L   Fecal colorectal cancer screen (FIT)   Result Value Ref Range    Occult Blood Screen FIT Positive (A) Negative       If you have any questions or concerns, please call the clinic at the number listed above.       Sincerely,      Javier Jones MD

## 2024-10-03 NOTE — PROGRESS NOTES
Hemalatha is a 84 year old who is being evaluated via a billable telephone visit.    What phone number would you like to be contacted at? [phone]  How would you like to obtain your AVS? Mail a copy  Originating Location (pt. Location): Home    Distant Location (provider location):  On-site        Subjective   Hemalatha is a 84 year old, presenting for the following health issues:  Patient Request (Discuss low hemoglobin and she is feeling worse)        10/3/2024     7:01 AM   Additional Questions   Roomed by Stephanie Mcfarland     History of Present Illness       Reason for visit:  Wedight loss and not feeling well   She is taking medications regularly.      Not short of breath     Tired more easily than usual      Tired in general    Sometime heart rate goes up    No blood loss    Drinking protein drink  Not feeling better    No pain    Nauseated    No vomiting    Swallowing fine    Bowels fine    No chest pain    One time had some chest symptoms, took tums and helped                 Objective           Vitals:  No vitals were obtained today due to virtual visit.    Physical Exam   General: Alert and no distress //Respiratory: No audible wheeze, cough, or shortness of breath // Psychiatric:  Appropriate affect, tone, and pace of words       ASSESSMENT / PLAN:  (D64.9) Anemia, unspecified type  (primary encounter diagnosis)  Comment: discussed in detail with patient.  Prudent to do further labs.  Her b12 was at lower end of normal so prudent to start a b complex pill.   Plan: Iron and iron binding capacity, Ferritin,         Folate, ESR: Erythrocyte sedimentation rate,         CRP, inflammation, Fecal colorectal cancer         screen (FIT)             (R53.83) Fatigue, unspecified type  Comment: may be multifactorial but the lower hemoglobin is certainly contributing  Plan: patient trying to eat better and has increased fluid intake.  Await these further labs.    Patient to schedule lab only appointment.     (E55.9)  Vitamin D deficiency disease  Comment: vit d level was normal  Plan: no change in med       I reviewed the patient's medications, allergies, medical history, family history, and social history.    Javier Jones MD        Phone call duration: 15 minutes ( 7:04 to 7:19 am )  Signed Electronically by: Javier Jones MD

## 2024-10-04 LAB
CRP SERPL-MCNC: <3 MG/L
FERRITIN SERPL-MCNC: 12 NG/ML (ref 11–328)
IRON BINDING CAPACITY (ROCHE): 506 UG/DL (ref 240–430)
IRON SATN MFR SERPL: 6 % (ref 15–46)
IRON SERPL-MCNC: 30 UG/DL (ref 37–145)

## 2024-10-07 ENCOUNTER — TELEPHONE (OUTPATIENT)
Dept: FAMILY MEDICINE | Facility: CLINIC | Age: 84
End: 2024-10-07
Payer: COMMERCIAL

## 2024-10-07 DIAGNOSIS — D64.9 ANEMIA, UNSPECIFIED TYPE: Primary | ICD-10-CM

## 2024-10-07 DIAGNOSIS — R19.5 POSITIVE FIT (FECAL IMMUNOCHEMICAL TEST): ICD-10-CM

## 2024-10-07 LAB — HEMOCCULT STL QL IA: POSITIVE

## 2024-10-07 NOTE — TELEPHONE ENCOUNTER
I called patient and discussed in detail the lab results    She has dropped hemoglobin 2 points in 3 months    Positive FIT test    I advised upper and lower scopes    Patient unsure    I will give her a week to think about it and call her back    Javier Jones MD

## 2024-10-07 NOTE — RESULT ENCOUNTER NOTE
Here are the lab results I called you about.    The positive FIT test means you are losing blood in the stool.  This is likely causing the low hemoglobin ( anemia) and this is why you feel tired.    I advise doing the upper and lower scope exams ( EGD and colonoscopy).      Think about this for a few days and I will try to call you back.    In the meantime, you could start taking one over the counter iron pill daily ( this may cause constipation ).    Javier Jones MD    Please mail letter and results to patient   Thanks  Javier Jones MD

## 2024-10-07 NOTE — CONFIDENTIAL NOTE
I called and discussed in detail.    Patient now wants to go ahead with scope exams    I put in order    She will schedule    Javier Jones MD

## 2024-10-09 NOTE — TELEPHONE ENCOUNTER
Patient calling, she said she is ready to discuss this further with PCP and to call her back at any time. She did not want to give specifics to the RN at this time.     Thank you,  Cecily Ferro RN

## 2024-10-10 ENCOUNTER — TELEPHONE (OUTPATIENT)
Dept: GASTROENTEROLOGY | Facility: CLINIC | Age: 84
End: 2024-10-10

## 2024-10-10 NOTE — TELEPHONE ENCOUNTER
"Endoscopy Scheduling Screen    Have you had any respiratory illness or flu-like symptoms in the last 10 days?  No    What is your communication preference for Instructions and/or Bowel Prep?   Mail/USPS    What insurance is in the chart?  Other:  [facility]    Ordering/Referring Provider: HE KUMAR   (If ordering provider performs procedure, schedule with ordering provider unless otherwise instructed. )    BMI: Estimated body mass index is 25.95 kg/m  as calculated from the following:    Height as of 9/23/24: 1.552 m (5' 1.1\").    Weight as of 9/23/24: 62.5 kg (137 lb 12.8 oz).     Sedation Ordered  moderate sedation.   If patient BMI > 50 do not schedule in [facility].    [facility] > 45 [facility]    Are you taking methadone or Suboxone?  NO, No RN review required.    Have you been diagnosed and are being treated for severe PTSD or severe anxiety?  NO, No RN review required.    Are you taking any prescription medications for pain 3 or more times per week?   NO, No RN review required.    Do you have a history of malignant hyperthermia?  No    (Females) Are you currently pregnant?   No     Have you been diagnosed or told you have pulmonary hypertension?   No    Do you have an LVAD?  No    Have you been told you have moderate to severe sleep apnea?  No.    Have you been told you have COPD, asthma, or any other lung disease?  No    Do you have any heart conditions?  No     Have you ever had or are you waiting for an organ transplant?  No. Continue scheduling, no site restrictions.    Have you had a stroke or transient ischemic attack (TIA aka \"mini stroke\" in the last 6 months?   No    Have you been diagnosed with or been told you have cirrhosis of the liver?   No.    Are you currently on dialysis?   No    Do you need assistance transferring?   No    BMI: Estimated body mass index is 25.95 kg/m  as calculated from the following:    Height as of 9/23/24: 1.552 m (5' 1.1\").    Weight as of 9/23/24: 62.5 kg (137 " lb 12.8 oz).     Is patients BMI > 40 and scheduling location [facility]?  No    Do you take an injectable or oral medication for weight loss or diabetes (excluding insulin)?  No    Do you take the medication Naltrexone?  No    Do you take blood thinners?  No       Prep   Are you currently on dialysis or do you have chronic kidney disease?  No    Do you have a diagnosis of diabetes?  No    Do you have a diagnosis of cystic fibrosis (CF)?  No    On a regular basis do you go 3 -5 days between bowel movements?  No    BMI > 40?  No    Preferred Pharmacy:    [id number] - [place], [place] - [address] [facility] [address]  [address]  [place] [address]  Phone: [phone] Fax: [phone]      Final Scheduling Details     Procedure scheduled  Colonoscopy / Upper endoscopy (EGD)    Surgeon:  ESTHER     Date of procedure:  11/5/24     Pre-OP / PAC:   No - Not required for this site.    Location  [facility] - [facility] - Per order.    Sedation   Moderate Sedation - Per order.      Patient Reminders:   You will receive a call from a Nurse to review instructions and health history.  This assessment must be completed prior to your procedure.  Failure to complete the Nurse assessment may result in the procedure being cancelled.      On the day of your procedure, please designate an adult(s) who can drive you home stay with you for the next 24 hours. The medicines used in the exam will make you sleepy. You will not be able to drive.      You cannot take public transportation, ride share services, or non-medical taxi service without a responsible caregiver.  Medical transport services are allowed with the requirement that a responsible caregiver will receive you at your destination.  We require that drivers and caregivers are confirmed prior to your procedure.

## 2024-10-10 NOTE — LETTER
October 15, 2024      Hemalatha Handy  [address] [facility]  [address] [place] [id number]              Dear Hemalatha,    Standard Miralax Bowel Prep   Prep instructions for your colonoscopy   For prep questions, please call: [facility] - [address], [place] [address] - [phone] option 2    Please read these instructions carefully at least 7 days prior to your colonoscopy procedure. Be sure to follow all directions completely. The inside of your colon must be clean to allow for a complete examination for the presence of any growths, polyps, and/or abnormalities, as well as their biopsy or removal. A number of tips are included in order to make this part of the procedure as comfortable as possible.    Getting ready   Purchase the following items over-the-counter/off the shelf at the drug store:    Four (4) - Dulcolax laxative (Bisacodyl) 5mg tablets (Do not use Dulcolax stool softener)   8.3 ounce bottle of Miralax powder (ClearLAX, SmoothLAX, PowderLAX)  64 ounces of Gatorade or similar sports drink. Not red or purple. (Pedialyte, Propel, Gatorade G2/Zero, Powerade, Powerade Zero)   10 ounce bottle of clear Magnesium Citrate    A nurse will call you to go over your appointment details and prep instructions. Not completing the nurse call could result with your appointment being cancelled.    You must arrange for an adult to drive you home after your exam. Your colonoscopy cannot be done unless you have a ride. If you need to use public transportation, someone must ride with you and stay with you for up to 24 hours.       7 days before procedure     Consult with your prescribing provider about stopping any:     Diabetic/Weight Loss Injectable Medication GLP-1 agonist (such as Exenatide (Byetta, Bydureon),  Mounjaro (Tirzepatide). Ozempic (Semaglutide). Semaglutide. Symlin (Pamlintide), Tanzeum (Albiglutide). Tirzepatide-Weight Management (Zepbound), Trulicity  (Dulaglutide), Victoza (Saxenda, Liraglutide), Wegovy (Semaglutide) please follow below guidelines for holding:    For weekly injection HOLD 7 days before procedure.  For once or twice a day injection HOLD the day before procedure and day of procedure.  For oral, daily dosing (Rybelsus) HOLD 7 days before procedure.    Blood thinning and/or anti platelet medications: such as Coumadin, Plavix, Xarelto, Eliquis, Lovenox or others, these medications may need to be stopped temporarily before your procedure.     If you take insulin for diabetes, ask your prescribing provider for instructions on how to take this medication while preparing for a colonoscopy.      Stop taking iron (ferrous sulfate), multivitamins that contain iron, and/or fiber supplements (Metamucil, Benefiber, Psyllium husk powder, Fibercon, etc.).      Stop eating whole kernel corn, popcorn, nuts, and foods that contain seeds. These can stay in the colon for many days, and they can clog up the colonoscope.       3 days before procedure     Begin a low-fiber diet (see examples below). No Olestra (a fat substitute).    Consume no more than 10-15 grams of fiber each day.     It is important to stay hydrated. Drink at least eight 8-ounce glasses of water a day.      LOW FIBER DIET   You can have:   Do not have:    Starches: White bread, rolls, biscuits, croissants, Cristal toast, white flour tortillas, waffles, pancakes, Hungarian toast; white rice, noodles, pasta, macaroni; cooked and peeled potatoes; plain crackers, saltines; cooked farina or cream of rice; puffed rice, corn flakes, Rice Krispies, Special K      Vegetables: tender cooked and canned, vegetable broths     Fruits and fruit juices: Strained fruit juice, canned fruit without seeds or skin (not pineapple), applesauce, pear sauce, ripe bananas, melons (not watermelon)     Milk products: Milk (plain or flavored), cheese, cottage cheese, yogurt (no berries), custard, ice cream       Proteins: Tender,  well-cooked ground beef, lamb, veal, ham, pork, chicken, turkey, fish or organ meat, Tofu, eggs, creamy peanut butter      Fats and condiments:  Margarine, butter, oils, mayonnaise, sour cream, salad dressing, plain gravy; spices, cooked herbs; sugar, clear jelly, honey, syrup      Snacks, sweets and drinks: Pretzels, hard candy; plain cakes and cookies (no nuts or seeds); gelatin, plain pudding, sherbet, Popsicles; coffee, tea, carbonated ( fizzy ) drinks  Starches: Breads or rolls that contain nuts, seeds or fruit; whole wheat or whole grain breads that contain more than 2 grams of fiber per serving; cornbread; corn or whole wheat tortillas; potatoes with skin; brown rice, wild rice, quinoa, kasha (buckwheat), and oatmeal      Vegetables: Any raw or steamed vegetables; vegetables with seeds; corn in any form      Fruits and fruit juices: Prunes, prune juice, raisins and other dried fruits, berries and other fruits with seeds, canned pineapple juices with pulp such as orange, grapefruit, pineapple or tomato juice     Milk products: Any yogurt with nuts, seeds or berries      Proteins: Tough, fibrous meats with gristle; cooked dried beans, peas or lentils; crunchy peanut butter     Fats and condiments: Pickles, olives, relish, horseradish; jam, marmalade, preserves      Snacks, sweets and drinks: Popcorn, nuts, seeds, granola, coconut, candies made with nuts or seeds; all desserts that contain nuts, seeds, raisins and other dried fruits, coconut, whole grains or bran.       1 day before procedure       Start a clear liquid diet (see examples below). Do not eat any solid food.      Drink at least eight to ten 8-ounce glasses of water throughout the day. ? ? ? ? ? ? ? ?      CLEAR LIQUID DIET:  You can have: Do not have:    Water, tea, coffee (no milk or cream)   Soda pop, Gatorade (not red or purple)   Coconut water   Jell-O, Popsicles (no milk or fruit pieces - not red or purple)   Fat-free soup broth or bouillon    Plain hard candy, such as clear life savers (not red or purple)   Clear juices and fruit-flavored drinks, such as apple juice, white grape juice, Hi-C, and Maurilio-Aid (not red or purple)  Milk or milk products such as ice cream, malts or shakes, or coffee creamer   Red or purple drinks of any kind such as cranberry juice, grape juice or Maurilio-Aid. Avoid red or purple Jell-O, Popsicles, sorbet, sherbet and candy   Juices with pulp such as orange, grapefruit, pineapple or tomato juice   Cream soups of any kind   Alcohol and beer   Protein drinks or protein powder     Step 1     At 4 PM, take 2 Dulcolax (Bisacodyl) tablets.   At 5 PM, mix the entire bottle of Miralax with 64 ounces of Gatorade in a pitcher and stir to dissolve the powder. Start drinking one 8-ounce glass of the Miralax and Gatorade mixture every 15 minutes until the pitcher is HALF empty (about 4 glasses).  Drink each glass quickly. Store the rest in the refrigerator.   Continue to drink clear liquids.    Step 2     At 10 PM, take 2 Dulcolax (Bisacodyl) tablets  At 10 PM start drinking the remainder of the Miralax and Gatorade mixture. Drink one 8-ounce glass of Miralax and Gatorade mixture every 15 minutes until the pitcher is empty (about 4 glasses). Drink each glass quickly.     Step 3     If you arrive for your procedure BEFORE 11 AM:  6 hours prior to your scheduled arrival to the endoscopy unit, drink 10 ounces of clear Magnesium Citrate.    If you arrive for your procedure AFTER 11 AM:  At 6 AM on the day of the exam drink 10 ounces of clear Magnesium Citrate.       Reminders While Drinking Laxatives:     After you start drinking the solution, stay near a toilet. You may have watery stools (diarrhea), mild cramping, bloating, and nausea. You may want to use Vaseline on the skin around your anus after each bowel movement or use wet wipes to prevent irritation. Bowel movements will be liquid and dark in color at first and then should turn clear  yellow in color.      Some find it easier to drink the Miralax and Gatorade mixture when it is chilled. Do not add ice as this will dilute the laxative. Drinking from a straw can be helpful to drink the liquid faster.     If you have nausea or vomiting during drinking the solution, rinse your mouth with water and take a 15-30 minute break and then continue drinking solution.       Day of procedure     2 hours before your arrival time stop drinking all liquids, including water.   Do not smoke or swallow anything, including water or gum for at least 2 hours before your arrival time. This is a safety issue. Your procedure could be cancelled if you do not follow directions.  No chewing tobacco 6 hours prior to procedure arrival time.     You may take your necessary morning medications with sips of water (4 ounces).   Do not take diabetes medicine by mouth until after your exam.  If you have asthma, bring your inhalers.  Please perform your nebulizer treatments and airway clearance therapy in the morning prior to the procedure (if applicable).    Arrive with a responsible adult who can drive you home and stay with you for up to 24 hours. The medications used during the procedure will make you sleepy, so you won't be able to drive yourself home.   You cannot use public transportation, ride-share services, or non-medical taxi services without a responsible caregiver. Medical transport services are okay, but a caregiver must be there to receive you at your destination.  Please check in with your  when you arrive. Drivers should stay on campus.    Expect to be at the procedure center for about 1.5-2.5 hours.    Do not wear jewelry (i.e. earrings, rings, necklaces, watches, etc.). Leave your purse, billfold, credit cards, and other valuables at home.      Bring insurance card and ID.       Answers to Commonly Asked Questions     How soon can I eat after the procedure?  You may resume your normal diet when you feel  ready, unless advised otherwise by the doctor performing your procedure. We recommend starting with a light meal.   Do not drink alcohol for 24 hours after your procedure.  You may resume normal activities (work, exercise, etc.) after 24 hours.    How might I feel after the procedure?  It is normal to feel bloated and gassy after your procedure. Walking will help move the air through your colon. You can take non-aspirin pain relievers that contain acetaminophen (Tylenol).  If you are having sedation, we require a responsible adult to take you home for your safety. The sedation medicines used to relax you during the procedure can impair your judgement and reaction time, and make you forgetful and possibly a little unsteady.  Do not drive, make any important decisions, or sign any legal documents for 24 hours after your procedure.    When will I get my test results?  You should have your procedure results and any lab results (if applicable) by letter, [employer] message, or phone call within 2 weeks. If you have any questions, please call the doctor that referred you for the procedure.    How do I know if my colon is cleaned out?   After completing the bowel prep, your bowel movements should be all liquid and yellow. Your bowel movements will look similar to urine in the toilet. If there are pieces of stool (poop) in the toilet, or if you can't see to the bottom of the toilet, please call our office for advice. Call [phone] and ask to speak with a nurse.    Why is the Miralax bowel prep taken in several steps?   The stool is flushed out by a large wave of fluid going through the colon. Just sipping a large volume of the solution will not achieve the desired result. Studies have shown that two smaller waves (or more in some cases) are better than one large one.      Why do I need to drink the magnesium citrate so close to the procedure arrival time?   The intestine continues to produce mucus and waste. Longer  intervals between the prep and the exam can lead to less than desired results. However, the stomach must be empty at the time of the exam in order to allow safe sedation. Therefore, there should be nothing by mouth 2 hours before the exam is started.    What if I need to cancel or reschedule my procedure?  Contact our endoscopy scheduling team at [phone], option 2. Monday through Friday, 7:00am-5:00pm.

## 2024-10-21 ENCOUNTER — TELEPHONE (OUTPATIENT)
Dept: GASTROENTEROLOGY | Facility: CLINIC | Age: 84
End: 2024-10-21
Payer: COMMERCIAL

## 2024-10-21 NOTE — TELEPHONE ENCOUNTER
"2014 colonoscopy notes: \"The colonoscopy was performed with                             difficulty due to a tortuous colon. The patient                             tolerated the procedure well. \"    Does patient recall discomfort during past procedure?  If patient wants to continue with CS, will need to send for review with scoping provider      --------------------------------------------------------------------------------------------------------------------        Pre visit planning completed.      Procedure details:    Patient scheduled for Colonoscopy/Upper endoscopy (EGD) on 11/5/24.     Arrival time: 0930. Procedure time 1015    Facility location: [facility] [place]; [address] [place], [place] [address], [address] Floor, [place] [address]. Check in location: 2nd Floor at Surgery desk.    Sedation type: Conscious sedation     Pre op exam needed? No.    Indication for procedure: screening      Chart review:     Electronic implanted devices? No    Recent diagnosis of diverticulitis within the last 6 weeks? No      Medication review:    Diabetic? No    Anticoagulants? No    Weight loss medication/injectable? No GLP-1 medication per patient's medication list.  RN will verify with pre-assessment call.    Other medication HOLDING recommendations:  N/A      Prep for procedure:     Bowel prep recommendation: Standard Miralax  Due to: standard bowel prep.    Prep instructions sent via letter by [facility] nurse 10/15/24         Corinne Kliber, RN  Endoscopy Procedure Pre Assessment RN  [phone] option 2    "

## 2024-10-22 NOTE — TELEPHONE ENCOUNTER
Pre assessment completed for upcoming procedure.   (Please see previous telephone encounter notes for complete details)    Procedure details:    Arrival time and facility location reviewed.    Pre op exam needed? No.    Designated  policy reviewed. Instructed to have someone stay 6 hours post procedure.     COVID policy reviewed.      Medication review:    Ferrous Sulfate (iron supplement): HOLD 7 days before procedure.      Prep for procedure:     Procedure prep instructions reviewed.        Additional information needed?  Patient does not recall previous procedure and would like to continue with CS.    Sent to scoping provider for review.        Patient  verbalized understanding and had no questions or concerns at this time.      Corinne Kliber, RN  Endoscopy Procedure Pre Assessment   [phone] option 4

## 2024-10-22 NOTE — TELEPHONE ENCOUNTER
UPDATE:    McBeath, Kristin, DO Kliber, Corinne, MIGUELINA  Should be MAC    Call to patient to relay above recommendation to r/s.  Left voicemail for patient to call back.    When patient calls back, intended to explain she will need to r/s and warm transfer to scheduling.      Message sent to scheduling to r/s with MAC as well    Corinne Kliber, RN  Endoscopy Procedure Pre Assessment RN  [phone] option 2

## 2024-10-24 NOTE — TELEPHONE ENCOUNTER
Rescheduled procedure    Patient scheduled for Colonoscopy/Upper endoscopy (EGD) on 11/5/24.    Arrival time: 1330. Procedure time 1415    Facility location: [facility] [place]; [address], [place] [address]    Sedation type: MAC    Pre op exam needed? No.    Pre-Assessment was completed for previously scheduled procedure. (See documentation below).  No new medical events or medications since last review.     Resent prep instructions via [employer].    RN spoke with Patient  and reviewed information. They have no further questions or concerns at this time.     Corinne Kliber, RN  Endoscopy Procedure Pre Assessment

## 2024-11-04 NOTE — TELEPHONE ENCOUNTER
Incoming call received from pt to ask if she could have anything to drink after she drinks the mg citrate.  Pt was advised that she could have clear liquids immediately following the mg citrate, but needs stop all clear liquids 2 hours prior to her arrival time.      Pt stated their understanding and have no further questions or concerns at this time.      Lynne Espinoza RN

## 2024-11-05 ENCOUNTER — ANESTHESIA (OUTPATIENT)
Dept: SURGERY | Facility: AMBULATORY SURGERY CENTER | Age: 84
End: 2024-11-05
Payer: COMMERCIAL

## 2024-11-05 ENCOUNTER — HOSPITAL ENCOUNTER (OUTPATIENT)
Facility: AMBULATORY SURGERY CENTER | Age: 84
Discharge: HOME | End: 2024-11-05
Attending: INTERNAL MEDICINE
Payer: COMMERCIAL

## 2024-11-05 ENCOUNTER — ANESTHESIA EVENT (OUTPATIENT)
Dept: SURGERY | Facility: AMBULATORY SURGERY CENTER | Age: 84
End: 2024-11-05
Payer: COMMERCIAL

## 2024-11-05 VITALS — HEART RATE: 95 BPM

## 2024-11-05 VITALS
OXYGEN SATURATION: 97 % | RESPIRATION RATE: 16 BRPM | SYSTOLIC BLOOD PRESSURE: 145 MMHG | DIASTOLIC BLOOD PRESSURE: 66 MMHG | TEMPERATURE: 97.1 F | HEART RATE: 100 BPM

## 2024-11-05 LAB
COLONOSCOPY: NORMAL
UPPER GI ENDOSCOPY: NORMAL

## 2024-11-05 PROCEDURE — 45378 DIAGNOSTIC COLONOSCOPY: CPT

## 2024-11-05 PROCEDURE — 99100 ANES PT EXTEME AGE<1 YR&>70: CPT | Performed by: ANESTHESIOLOGY

## 2024-11-05 PROCEDURE — 43239 EGD BIOPSY SINGLE/MULTIPLE: CPT

## 2024-11-05 PROCEDURE — G8907 PT DOC NO EVENTS ON DISCHARG: HCPCS

## 2024-11-05 PROCEDURE — G8918 PT W/O PREOP ORDER IV AB PRO: HCPCS

## 2024-11-05 PROCEDURE — 88305 TISSUE EXAM BY PATHOLOGIST: CPT | Performed by: PATHOLOGY

## 2024-11-05 PROCEDURE — 43239 EGD BIOPSY SINGLE/MULTIPLE: CPT | Performed by: REGISTERED NURSE

## 2024-11-05 PROCEDURE — 43239 EGD BIOPSY SINGLE/MULTIPLE: CPT | Performed by: ANESTHESIOLOGY

## 2024-11-05 PROCEDURE — 99100 ANES PT EXTEME AGE<1 YR&>70: CPT | Performed by: REGISTERED NURSE

## 2024-11-05 RX ORDER — PROPOFOL 10 MG/ML
INJECTION, EMULSION INTRAVENOUS CONTINUOUS PRN
Status: DISCONTINUED | OUTPATIENT
Start: 2024-11-05 | End: 2024-11-05

## 2024-11-05 RX ORDER — ONDANSETRON 4 MG/1
4 TABLET, ORALLY DISINTEGRATING ORAL EVERY 6 HOURS PRN
Status: DISCONTINUED | OUTPATIENT
Start: 2024-11-05 | End: 2024-11-06 | Stop reason: HOSPADM

## 2024-11-05 RX ORDER — FLUMAZENIL 0.1 MG/ML
0.2 INJECTION, SOLUTION INTRAVENOUS
Status: DISCONTINUED | OUTPATIENT
Start: 2024-11-05 | End: 2024-11-06 | Stop reason: HOSPADM

## 2024-11-05 RX ORDER — PROCHLORPERAZINE MALEATE 5 MG/1
5 TABLET ORAL EVERY 6 HOURS PRN
Status: DISCONTINUED | OUTPATIENT
Start: 2024-11-05 | End: 2024-11-06 | Stop reason: HOSPADM

## 2024-11-05 RX ORDER — PROPOFOL 10 MG/ML
INJECTION, EMULSION INTRAVENOUS PRN
Status: DISCONTINUED | OUTPATIENT
Start: 2024-11-05 | End: 2024-11-05

## 2024-11-05 RX ORDER — NALOXONE HYDROCHLORIDE 0.4 MG/ML
0.4 INJECTION, SOLUTION INTRAMUSCULAR; INTRAVENOUS; SUBCUTANEOUS
Status: DISCONTINUED | OUTPATIENT
Start: 2024-11-05 | End: 2024-11-06 | Stop reason: HOSPADM

## 2024-11-05 RX ORDER — ONDANSETRON 2 MG/ML
4 INJECTION INTRAMUSCULAR; INTRAVENOUS EVERY 6 HOURS PRN
Status: DISCONTINUED | OUTPATIENT
Start: 2024-11-05 | End: 2024-11-06 | Stop reason: HOSPADM

## 2024-11-05 RX ORDER — ONDANSETRON 2 MG/ML
4 INJECTION INTRAMUSCULAR; INTRAVENOUS
Status: DISCONTINUED | OUTPATIENT
Start: 2024-11-05 | End: 2024-11-06 | Stop reason: HOSPADM

## 2024-11-05 RX ORDER — LIDOCAINE HYDROCHLORIDE 20 MG/ML
INJECTION, SOLUTION INFILTRATION; PERINEURAL PRN
Status: DISCONTINUED | OUTPATIENT
Start: 2024-11-05 | End: 2024-11-05

## 2024-11-05 RX ORDER — SODIUM CHLORIDE, SODIUM LACTATE, POTASSIUM CHLORIDE, CALCIUM CHLORIDE 600; 310; 30; 20 MG/100ML; MG/100ML; MG/100ML; MG/100ML
INJECTION, SOLUTION INTRAVENOUS CONTINUOUS PRN
Status: DISCONTINUED | OUTPATIENT
Start: 2024-11-05 | End: 2024-11-05

## 2024-11-05 RX ORDER — NALOXONE HYDROCHLORIDE 0.4 MG/ML
0.2 INJECTION, SOLUTION INTRAMUSCULAR; INTRAVENOUS; SUBCUTANEOUS
Status: DISCONTINUED | OUTPATIENT
Start: 2024-11-05 | End: 2024-11-06 | Stop reason: HOSPADM

## 2024-11-05 RX ORDER — LIDOCAINE 40 MG/G
CREAM TOPICAL
Status: DISCONTINUED | OUTPATIENT
Start: 2024-11-05 | End: 2024-11-06 | Stop reason: HOSPADM

## 2024-11-05 RX ADMIN — PROPOFOL 30 MG: 10 INJECTION, EMULSION INTRAVENOUS at 13:43

## 2024-11-05 RX ADMIN — PROPOFOL 70 MCG/KG/MIN: 10 INJECTION, EMULSION INTRAVENOUS at 13:43

## 2024-11-05 RX ADMIN — SODIUM CHLORIDE, SODIUM LACTATE, POTASSIUM CHLORIDE, CALCIUM CHLORIDE: 600; 310; 30; 20 INJECTION, SOLUTION INTRAVENOUS at 13:41

## 2024-11-05 RX ADMIN — LIDOCAINE HYDROCHLORIDE 20 MG: 20 INJECTION, SOLUTION INFILTRATION; PERINEURAL at 13:43

## 2024-11-05 NOTE — ANESTHESIA PREPROCEDURE EVALUATION
Anesthesia Pre-Procedure Evaluation    Patient: Hemalatha Handy   MRN: [id number] : 1940        Procedure : Procedure(s):  Colonoscopy with CO2 insufflation  Combined Esophagoscopy, Gastroscopy, Duodenoscopy (Egd) With Co2 Insufflation          Past Medical History:   Diagnosis Date    Actinic keratosis     Anxiety     Basal cell carcinoma pt unsure    abdomen, and elsewhere    Cervical cancer (H) early     stage 3 txd.  she did not have hysterectomy    Colorectal polyps 2008    1 Polyp    Gastroesophageal reflux disease without esophagitis     HLD (hyperlipidemia)     Hypertension     Vulvar cancer (H) 2022      Past Surgical History:   Procedure Laterality Date    COLONOSCOPY  2008    ENT SURGERY  2012    Left lower lip lesion    EXCISE VULVA WIDE LOCAL Left 2023    Procedure: left pelvic exam  under anesthesia, wide local excision left vulva;  Surgeon: Jenni Melton MD;  Location: [facility] OR      Allergies   Allergen Reactions    Amoxicillin Hives    Asa [Aspirin]     Conj Estrog-Medroxyprogest Ace     Evista [Raloxifene Hydrochloride]     Raloxifene Hives and Rash      Social History     Tobacco Use    Smoking status: Former     Current packs/day: 0.00     Average packs/day: 1 pack/day for 45.0 years (45.0 ttl pk-yrs)     Types: Cigarettes     Start date: 1960     Quit date: 2005     Years since quittin.8     Passive exposure: Past    Smokeless tobacco: Never   Substance Use Topics    Alcohol use: Yes     Comment: socially      Wt Readings from Last 1 Encounters:   24 62.5 kg (137 lb 12.8 oz)        Anesthesia Evaluation            ROS/MED HX  ENT/Pulmonary:       Neurologic:       Cardiovascular:     (+)  hypertension- -   -  - -                                   (-) murmur   METS/Exercise Tolerance:     Hematologic:       Musculoskeletal:       GI/Hepatic:     (+) GERD,                   Renal/Genitourinary:       Endo:       Psychiatric/Substance  "Use:     (+) psychiatric history anxiety       Infectious Disease:       Malignancy:       Other:            Physical Exam    Airway        Mallampati: I   TM distance: > 3 FB   Neck ROM: full   Mouth opening: > 3 cm    Respiratory Devices and Support         Dental       (+) Minor Abnormalities - some fillings, tiny chips      Cardiovascular   cardiovascular exam normal       Rhythm and rate: regular and normal (-) no murmur    Pulmonary   pulmonary exam normal        breath sounds clear to auscultation   (-) no rhonchi        OUTSIDE LABS:  CBC:   Lab Results   Component Value Date    WBC 7.6 09/23/2024    WBC 9.0 06/14/2024    HGB 10.9 (L) 09/23/2024    HGB 12.8 06/14/2024    HCT 34.5 (L) 09/23/2024    HCT 39.9 06/14/2024     09/23/2024     06/14/2024     BMP:   Lab Results   Component Value Date     09/23/2024     06/14/2024    POTASSIUM 4.7 09/23/2024    POTASSIUM 4.3 06/14/2024    CHLORIDE 103 09/23/2024    CHLORIDE 103 06/14/2024    CO2 24 09/23/2024    CO2 26 06/14/2024    BUN 32.9 (H) 09/23/2024    BUN 16.7 06/14/2024    CR 0.66 09/23/2024    CR 0.63 06/14/2024    GLC 90 09/23/2024     (H) 06/14/2024     COAGS: No results found for: \"PTT\", \"INR\", \"FIBR\"  POC: No results found for: \"BGM\", \"HCG\", \"HCGS\"  HEPATIC:   Lab Results   Component Value Date    ALBUMIN 4.4 09/23/2024    PROTTOTAL 7.1 09/23/2024    ALT 16 09/23/2024    AST 21 09/23/2024    ALKPHOS 90 09/23/2024    BILITOTAL 0.2 09/23/2024     OTHER:   Lab Results   Component Value Date    A1C 5.3 09/23/2024    VASHTI 9.4 09/23/2024    LIPASE 129 03/31/2023    AMYLASE 52 12/09/2021    TSH 1.13 09/23/2024    SED 18 10/03/2024       Anesthesia Plan    ASA Status:  2    NPO Status:  NPO Appropriate    Anesthesia Type: MAC.     - Reason for MAC: immobility needed   Induction: Propofol.   Maintenance: TIVA.        Consents    Anesthesia Plan(s) and associated risks, benefits, and realistic alternatives discussed. Questions " answered and patient/representative(s) expressed understanding.     - Discussed:     - Discussed with:  Patient      - Extended Intubation/Ventilatory Support Discussed: No.      - Patient is DNR/DNI Status: No     Use of blood products discussed: No .     Postoperative Care       PONV prophylaxis: Background Propofol Infusion     Comments:    Other Comments: Anxiolytic/Sedating meds prior to procedure:N/A  Discussed common and potentially harmful risks for MAC (GA as backup).   These risks include, but were not limited to: Conversion to secured airway, Sore throat, Airway injury, Dental injury, PONV  Risks of invasive procedures were not discussed: N/A    All questions were answered.             Santana Santiago MD    I have reviewed the pertinent notes and labs in the chart from the past 30 days and (re)examined the patient.  Any updates or changes from those notes are reflected in this note.

## 2024-11-05 NOTE — H&P
[facility] Anesthesia Pre-op History and Physical    Hemalatha Handy MRN# [id number]   Age: 84 year old YOB: 1940            Date of Exam 11/5/2024       Primary care provider: Javier Jones         Chief Complaint and/or Reason for Procedure:     Normocytic anemia, + FIT         Active problem list:     Patient Active Problem List    Diagnosis Date Noted    Chronic right shoulder pain 09/17/2024     Priority: Medium    Vulvar cancer (H) 12/19/2022     Priority: Medium     Added automatically from request for surgery [id number]      Essential hypertension with goal blood pressure less than 140/90 08/01/2016     Priority: Medium    Lateral meniscus tear, left, initial encounter 07/07/2015     Priority: Medium    Popliteal cyst, left 07/07/2015     Priority: Medium    GERD (gastroesophageal reflux disease) 12/30/2011     Priority: Medium    Anxiety 12/22/2010     Priority: Medium            Medications (include herbals and vitamins):   Any Plavix use in the last 7 days? No     Current Outpatient Medications   Medication Sig Dispense Refill    citalopram (CELEXA) 20 MG tablet TAKE 1 TABLET(20 MG) BY MOUTH DAILY 90 tablet 0    lisinopril (ZESTRIL) 20 MG tablet Take 1 tablet (20 mg) by mouth daily. 100 tablet 1    ALPRAZolam (XANAX) 0.25 MG tablet TAKE 1 TABLET(0.25 MG) BY MOUTH THREE TIMES DAILY AS NEEDED FOR ANXIETY 30 tablet 0    chlorhexidine (PERIDEX) 0.12 % solution SWISH AND SPIT 15 ML BY MOUTH TWICE DAILY AS NEEDED 473 mL 1    MOTRIN 800 MG OR TABS Take by mouth every 6 hours as needed      multivitamin w/minerals (THERA-VIT-M) tablet Take 1 tablet by mouth daily       Current Facility-Administered Medications   Medication Dose Route Frequency Provider Last Rate Last Admin    BUPivacaine (MARCAINE) 0.25 % injection 4 mL  4 mL      4 mL at 09/13/24 0912    BUPivacaine (MARCAINE) 0.25 % injection 4 mL  4 mL   Otoniel Perez MD   4 mL at 02/07/24 0906    flumazenil (ROMAZICON) injection  0.2 mg  0.2 mg Intravenous q1 min prn McLe, Rachel, DO        lidocaine (LMX4) kit   Topical Q1H PRN McJoeath, Rachel, DO        lidocaine 1 % 0.1-1 mL  0.1-1 mL Other Q1H PRN McBeath, Rachel, DO        methylPREDNISolone (DEPO-Medrol) injection 80 mg  80 mg   Otoniel Perez MD   80 mg at 02/07/24 0906    naloxone (NARCAN) injection 0.2 mg  0.2 mg Intravenous Q2 Min PRN McBeath, Rachel, DO        naloxone (NARCAN) injection 0.2 mg  0.2 mg Intramuscular Q2 Min PRN McBeath, Rachel, DO        naloxone (NARCAN) injection 0.4 mg  0.4 mg Intravenous Q2 Min PRN McBeath, Rachel, DO        naloxone (NARCAN) injection 0.4 mg  0.4 mg Intramuscular Q2 Min PRN McBeath, Rachel, DO        ondansetron (ZOFRAN ODT) ODT tab 4 mg  4 mg Oral Q6H PRN McBeath, Rachel, DO        Or    ondansetron (ZOFRAN) injection 4 mg  4 mg Intravenous Q6H PRN McBeath, Rachel, DO        ondansetron (ZOFRAN) injection 4 mg  4 mg Intravenous Once PRN McBeath, Rachel, DO        prochlorperazine (COMPAZINE) injection 5 mg  5 mg Intravenous Q6H PRN McBeath, Rachel, DO        Or    prochlorperazine (COMPAZINE) tablet 5 mg  5 mg Oral Q6H PRN McBeath, Rachel, DO        sodium chloride (PF) 0.9% PF flush 3 mL  3 mL Intracatheter Q8H McBeath, Rachel, DO        sodium chloride (PF) 0.9% PF flush 3 mL  3 mL Intracatheter q1 min prn McBeath, Rachel, DO        triamcinolone (KENALOG-40) injection 80 mg  80 mg      80 mg at 09/13/24 0912             Allergies:      Allergies   Allergen Reactions    Amoxicillin Hives    Asa [Aspirin]     Conj Estrog-Medroxyprogest Ace     Evista [Raloxifene Hydrochloride]     Raloxifene Hives and Rash     Allergy to Latex? No  Allergy to tape?   No  Intolerances:             Physical Exam:   All vitals have been reviewed  Patient Vitals for the past 8 hrs:   BP Temp Temp src Pulse Resp SpO2   11/05/24 1302 121/73 97.1  F (36.2  C) Temporal 116 18 95 %     No intake/output data recorded.  Lungs:   no increased work  of breathing     Cardiovascular:   RRR             Lab / Radiology Results:   \      Anesthetic risk and/or ASA classification:   2    Rachel Duran DO

## 2024-11-05 NOTE — ANESTHESIA POSTPROCEDURE EVALUATION
Patient: Hemalatha Hanyd    Procedure: Procedure(s):  Colonoscopy with CO2 insufflation  Combined Esophagoscopy, Gastroscopy, Duodenoscopy (Egd) With Co2 Insufflation  ESOPHAGOGASTRODUODENOSCOPY, WITH BIOPSY       Anesthesia Type:  MAC    Note:  Disposition: Outpatient   Postop Pain Control: Uneventful            Sign Out: Well controlled pain   PONV: No   Neuro/Psych: Uneventful            Sign Out: Acceptable/Baseline neuro status   Airway/Respiratory: Uneventful            Sign Out: Acceptable/Baseline resp. status   CV/Hemodynamics: Uneventful            Sign Out: Acceptable CV status; No obvious hypovolemia; No obvious fluid overload   Other NRE: NONE   DID A NON-ROUTINE EVENT OCCUR? No           Last vitals:  Vitals Value Taken Time   /74 11/05/24 1412   Temp     Pulse 98 11/05/24 1412   Resp 16 11/05/24 1412   SpO2 97 % 11/05/24 1412       Electronically Signed By: Santana Santiago MD  November 5, 2024  2:19 PM

## 2024-11-05 NOTE — ANESTHESIA CARE TRANSFER NOTE
Patient: Hemalatha Handy    Procedure: Procedure(s):  Colonoscopy with CO2 insufflation  Combined Esophagoscopy, Gastroscopy, Duodenoscopy (Egd) With Co2 Insufflation  ESOPHAGOGASTRODUODENOSCOPY, WITH BIOPSY       Diagnosis: Anemia, unspecified type [D64.9]  Positive FIT (fecal immunochemical test) [R19.5]  Diagnosis Additional Information: No value filed.    Anesthesia Type:   MAC     Note:    Oropharynx: oropharynx clear of all foreign objects and spontaneously breathing  Level of Consciousness: awake  Oxygen Supplementation: room air    Independent Airway: airway patency satisfactory and stable  Dentition: dentition unchanged  Vital Signs Stable: post-procedure vital signs reviewed and stable  Report to RN Given: handoff report given  Patient transferred to: Phase II    Handoff Report: Identifed the Patient, Identified the Reponsible Provider, Reviewed the pertinent medical history, Discussed the surgical course, Reviewed Intra-OP anesthesia mangement and issues during anesthesia, Set expectations for post-procedure period and Allowed opportunity for questions and acknowledgement of understanding      Vitals:  Vitals Value Taken Time   BP     Temp     Pulse     Resp     SpO2         Electronically Signed By: SATHYA Marie CRNA  November 5, 2024  2:12 PM

## 2024-11-11 PROBLEM — G89.29 CHRONIC RIGHT SHOULDER PAIN: Status: RESOLVED | Noted: 2024-09-17 | Resolved: 2024-11-11

## 2024-11-11 PROBLEM — M25.511 CHRONIC RIGHT SHOULDER PAIN: Status: RESOLVED | Noted: 2024-09-17 | Resolved: 2024-11-11

## 2024-11-11 LAB
PATH REPORT.ADDENDUM SPEC: NORMAL
PATH REPORT.COMMENTS IMP SPEC: NORMAL
PATH REPORT.COMMENTS IMP SPEC: NORMAL
PATH REPORT.FINAL DX SPEC: NORMAL
PATH REPORT.GROSS SPEC: NORMAL
PATH REPORT.MICROSCOPIC SPEC OTHER STN: NORMAL
PATH REPORT.RELEVANT HX SPEC: NORMAL
PHOTO IMAGE: NORMAL

## 2024-11-18 NOTE — RESULT ENCOUNTER NOTE
Spoke to patient and advised of results, patient voiced understanding    Thank you,  BEAU ObrienN RN  [facility]  [facility]

## 2024-11-26 ENCOUNTER — OFFICE VISIT (OUTPATIENT)
Dept: FAMILY MEDICINE | Facility: CLINIC | Age: 84
End: 2024-11-26
Payer: COMMERCIAL

## 2024-11-26 VITALS
TEMPERATURE: 97.6 F | DIASTOLIC BLOOD PRESSURE: 66 MMHG | SYSTOLIC BLOOD PRESSURE: 152 MMHG | HEART RATE: 85 BPM | HEIGHT: 61 IN | WEIGHT: 140.13 LBS | BODY MASS INDEX: 26.46 KG/M2 | OXYGEN SATURATION: 97 % | RESPIRATION RATE: 16 BRPM

## 2024-11-26 DIAGNOSIS — Z00.00 ENCOUNTER FOR MEDICARE ANNUAL WELLNESS EXAM: Primary | ICD-10-CM

## 2024-11-26 DIAGNOSIS — D64.9 ANEMIA, UNSPECIFIED TYPE: ICD-10-CM

## 2024-11-26 DIAGNOSIS — Z23 ENCOUNTER FOR IMMUNIZATION: ICD-10-CM

## 2024-11-26 DIAGNOSIS — I10 ESSENTIAL HYPERTENSION WITH GOAL BLOOD PRESSURE LESS THAN 140/90: ICD-10-CM

## 2024-11-26 DIAGNOSIS — F41.9 ANXIETY: ICD-10-CM

## 2024-11-26 LAB
ALBUMIN SERPL BCG-MCNC: 4 G/DL (ref 3.5–5.2)
ALP SERPL-CCNC: 67 U/L (ref 40–150)
ALT SERPL W P-5'-P-CCNC: 15 U/L (ref 0–50)
ANION GAP SERPL CALCULATED.3IONS-SCNC: 6 MMOL/L (ref 7–15)
AST SERPL W P-5'-P-CCNC: 22 U/L (ref 0–45)
BASOPHILS # BLD AUTO: 0 10E3/UL (ref 0–0.2)
BASOPHILS NFR BLD AUTO: 1 %
BILIRUB SERPL-MCNC: 0.3 MG/DL
BUN SERPL-MCNC: 17.9 MG/DL (ref 8–23)
CALCIUM SERPL-MCNC: 9.2 MG/DL (ref 8.8–10.4)
CHLORIDE SERPL-SCNC: 105 MMOL/L (ref 98–107)
CREAT SERPL-MCNC: 0.69 MG/DL (ref 0.51–0.95)
EGFRCR SERPLBLD CKD-EPI 2021: 85 ML/MIN/1.73M2
EOSINOPHIL # BLD AUTO: 0.2 10E3/UL (ref 0–0.7)
EOSINOPHIL NFR BLD AUTO: 5 %
ERYTHROCYTE [DISTWIDTH] IN BLOOD BY AUTOMATED COUNT: 15.2 % (ref 10–15)
FERRITIN SERPL-MCNC: 27 NG/ML (ref 11–328)
GLUCOSE SERPL-MCNC: 98 MG/DL (ref 70–99)
HCO3 SERPL-SCNC: 29 MMOL/L (ref 22–29)
HCT VFR BLD AUTO: 39.1 % (ref 35–47)
HGB BLD-MCNC: 12.2 G/DL (ref 11.7–15.7)
IMM GRANULOCYTES # BLD: 0 10E3/UL
IMM GRANULOCYTES NFR BLD: 0 %
IRON BINDING CAPACITY (ROCHE): 383 UG/DL (ref 240–430)
IRON SATN MFR SERPL: 10 % (ref 15–46)
IRON SERPL-MCNC: 39 UG/DL (ref 37–145)
LYMPHOCYTES # BLD AUTO: 1 10E3/UL (ref 0.8–5.3)
LYMPHOCYTES NFR BLD AUTO: 25 %
MCH RBC QN AUTO: 29.8 PG (ref 26.5–33)
MCHC RBC AUTO-ENTMCNC: 31.2 G/DL (ref 31.5–36.5)
MCV RBC AUTO: 95 FL (ref 78–100)
MONOCYTES # BLD AUTO: 0.4 10E3/UL (ref 0–1.3)
MONOCYTES NFR BLD AUTO: 11 %
NEUTROPHILS # BLD AUTO: 2.3 10E3/UL (ref 1.6–8.3)
NEUTROPHILS NFR BLD AUTO: 58 %
PLATELET # BLD AUTO: 254 10E3/UL (ref 150–450)
POTASSIUM SERPL-SCNC: 4.4 MMOL/L (ref 3.4–5.3)
PROT SERPL-MCNC: 6.7 G/DL (ref 6.4–8.3)
RBC # BLD AUTO: 4.1 10E6/UL (ref 3.8–5.2)
SODIUM SERPL-SCNC: 140 MMOL/L (ref 135–145)
WBC # BLD AUTO: 3.9 10E3/UL (ref 4–11)

## 2024-11-26 PROCEDURE — 91320 SARSCV2 VAC 30MCG TRS-SUC IM: CPT | Performed by: FAMILY MEDICINE

## 2024-11-26 PROCEDURE — 83550 IRON BINDING TEST: CPT | Performed by: FAMILY MEDICINE

## 2024-11-26 PROCEDURE — 82728 ASSAY OF FERRITIN: CPT | Performed by: FAMILY MEDICINE

## 2024-11-26 PROCEDURE — 83540 ASSAY OF IRON: CPT | Performed by: FAMILY MEDICINE

## 2024-11-26 PROCEDURE — G0439 PPPS, SUBSEQ VISIT: HCPCS | Performed by: FAMILY MEDICINE

## 2024-11-26 PROCEDURE — 85025 COMPLETE CBC W/AUTO DIFF WBC: CPT | Performed by: FAMILY MEDICINE

## 2024-11-26 PROCEDURE — 90662 IIV NO PRSV INCREASED AG IM: CPT | Performed by: FAMILY MEDICINE

## 2024-11-26 PROCEDURE — 99214 OFFICE O/P EST MOD 30 MIN: CPT | Mod: 25 | Performed by: FAMILY MEDICINE

## 2024-11-26 PROCEDURE — 90480 ADMN SARSCOV2 VAC 1/ONLY CMP: CPT | Performed by: FAMILY MEDICINE

## 2024-11-26 PROCEDURE — 80053 COMPREHEN METABOLIC PANEL: CPT | Performed by: FAMILY MEDICINE

## 2024-11-26 PROCEDURE — G0008 ADMIN INFLUENZA VIRUS VAC: HCPCS | Performed by: FAMILY MEDICINE

## 2024-11-26 PROCEDURE — 36415 COLL VENOUS BLD VENIPUNCTURE: CPT | Performed by: FAMILY MEDICINE

## 2024-11-26 RX ORDER — LISINOPRIL 20 MG/1
20 TABLET ORAL DAILY
Qty: 100 TABLET | Refills: 1 | Status: SHIPPED | OUTPATIENT
Start: 2024-11-26

## 2024-11-26 SDOH — HEALTH STABILITY: PHYSICAL HEALTH: ON AVERAGE, HOW MANY DAYS PER WEEK DO YOU ENGAGE IN MODERATE TO STRENUOUS EXERCISE (LIKE A BRISK WALK)?: 0 DAYS

## 2024-11-26 SDOH — HEALTH STABILITY: PHYSICAL HEALTH: ON AVERAGE, HOW MANY MINUTES DO YOU ENGAGE IN EXERCISE AT THIS LEVEL?: 0 MIN

## 2024-11-26 ASSESSMENT — SOCIAL DETERMINANTS OF HEALTH (SDOH): HOW OFTEN DO YOU GET TOGETHER WITH FRIENDS OR RELATIVES?: ONCE A WEEK

## 2024-11-26 ASSESSMENT — PAIN SCALES - GENERAL: PAINLEVEL_OUTOF10: NO PAIN (0)

## 2024-11-26 NOTE — LETTER
[facility]  11/26/24  Patient: Hemalatha Handy  YOB: 1940  Medical Record Number: [id number]                                                                                  Non-Opioid Controlled Substance Agreement    This is an agreement between you and your provider regarding safe and appropriate use of controlled substances prescribed by your care team. Controlled substances are?medicines that can cause physical and mental dependence (abuse).     There are strict laws about having and using these medicines. We here at [facility] are  committed to working with you in your efforts to get better. To support you in this work, we'll help you schedule regular office appointments for medicine refills. If we must cancel or change your appointment for any reason, we'll make sure you have enough medicine to last until your next appointment.     As a Provider, I will:   Listen carefully to your concerns while treating you with respect.   Recommend a treatment plan that I believe is in your best interest and may involve therapies other than medicine.    Talk with you often about the possible benefits and the risk of harm of any medicine that we prescribe for you.  Assess the safety of this medicine and check how well it works.    Provide a plan on how to taper (discontinue or go off) using this medicine if the decision is made to stop its use.      ::  As a Patient, I understand controlled substances:     Are prescribed by my care provider to help me function or work and manage my condition(s).?  Are strong medicines and can cause serious side effects.     Need to be taken exactly as prescribed.?Combining controlled substances with certain medicines or chemicals (such as illegal drugs, alcohol, sedatives, sleeping pills, and benzodiazepines) can be dangerous or even fatal.? If I stop taking my medicines suddenly, I may have severe withdrawal symptoms.     The risks, benefits, and  side effects of these medicine(s) were explained to me. I agree that:    I will take part in other treatments as advised by my care team. This may be psychiatry or counseling, physical therapy, behavioral therapy, group treatment or a referral to specialist.    I will keep all my appointments and understand this is part of the monitoring of controlled substances.?My care team may require an office visit for EVERY controlled substance refill. If I miss appointments or don t follow instructions, my care team may stop my medicine    I will take my medicines as prescribed. I will not change the dose or schedule unless my care team tells me to. There will be no refills if I run out early.      I may be asked to come to the clinic and complete a urine drug test or complete a pill count. If I don t give a urine sample or participate in a pill count, the care team may stop my medicine.    I will only receive controlled substance prescriptions from this clinic. If I am treated by another provider, I will tell them that I am taking controlled substances and that I have a treatment agreement with this provider. I will inform my [facility] care team within one business day if I am given a prescription for any controlled substance by another healthcare provider. My [facility] care team can contact other providers and pharmacists about my use of any medicines.    It is up to me to make sure that I don't run out of my medicines on weekends or holidays.?If my care team is willing to refill my prescription without a visit, I must request refills only during office hours. Refills may take up to 3 business days to process. I will use one pharmacy to fill all my controlled substance prescriptions. I will notify the clinic about any changes to my insurance or medicine availability.    I am responsible for my prescriptions. If the medicine/prescription is lost, stolen or destroyed, it will not be replaced.?I also agree not  to share controlled substance medicines with anyone.     I am aware I should not use any illegal or recreational drugs. I agree not to drink alcohol unless my care team says I can.     If I enroll in the [place] [facility] Cannabis program, I will tell my care team before my next refill.    I will tell my care team right away if I become pregnant, have a new medical problem treated outside of my regular clinic, or have a change in my medicines.     I understand that this medicine can affect my thinking, judgment and reaction time.? Alcohol and drugs affect the brain and body, which can affect the safety of my driving. Being under the influence of alcohol or drugs can affect my decision-making, behaviors, personal safety and the safety of others. Driving while impaired (DWI) can occur if a person is driving, operating or in physical control of a car, motorcycle, boat, snowmobile, ATV, motorbike, off-road vehicle or any other motor vehicle ([place] Statute 169A.20). I understand the risk if I choose to drive or operate any vehicle or machinery.    I understand that if I do not follow any of the conditions above, my prescriptions or treatment may be stopped or changed.   I agree that my provider, clinic care team and pharmacy may work with any city, state or federal law enforcement agency that investigates the misuse, sale or other diversion of my controlled medicine. I will allow my provider to discuss my care with, or share a copy of, this agreement with any other treating provider, pharmacy or emergency room where I receive care.     I have read this agreement and have asked questions about anything I did not understand.    ________________________________________________________  Patient Signature - Hemalatha Handy     ___________________                   Date     ________________________________________________________  Provider Signature - Javier Jones MD       ___________________                   Date      ________________________________________________________  Witness Signature (required if provider not present while patient signing)          ___________________                   Date

## 2024-11-26 NOTE — PROGRESS NOTES
{PROVIDER CHARTING PREFERENCE:651438}    Dorian Penny is a 84 year old, presenting for the following health issues:  Patient Request (Follow up on low hemoglobin) and Wellness Visit        11/26/2024     9:27 AM   Additional Questions   Roomed by Stephanie GREENBERG     {MA/LPN/RN Pre-Provider Visit Orders- hCG/UA/Strep (Optional):617830}  Annual Wellness Visit   {Split Bill scripting  The purpose of this visit is to discuss your medical history and prevent health problems before you are sick. You may be responsible for a co-pay, coinsurance, or deductible if your visit today includes services such as checking on a sore throat, having an x-ray or lab test, or treating and evaluating a new or existing condition :192121}  Patient has been advised of split billing requirements and indicates understanding: Yes   {Provider  Link to SmartSet :270757}     {ROOMER if patient is in their first year of Medicare a vision screen is required click here to document the Vison screen and then refresh the note to pull in results  :769962}  Health Care Directive  Patient does not have a Health Care Directive: Patient states has Advance Directive and will bring in a copy to clinic.  In general, how would you rate your overall physical health? good  Do you have a special diet?  Regular (no restrictions)  { Click here to complete exercise, social connection and stress questions After questions have been completed, refresh note to pull answers into note  :208341}       No data to display              Do you see a dentist two times every year?  Yes  Have you been more tired than usual lately?  (!) YES   Discussed possible causes of fatigue.   If you drink alcohol do you typically have >3 drinks per day or >7 drinks per week? No  Do you have a current opioid prescription? No  Do you use any other controlled substances or medications that are not prescribed by a provider? None  Social History     Tobacco Use    Smoking status:  Former     Current packs/day: 0.00     Average packs/day: 1 pack/day for 45.0 years (45.0 ttl pk-yrs)     Types: Cigarettes     Start date: 1960     Quit date: 2005     Years since quittin.9     Passive exposure: Past    Smokeless tobacco: Never   Vaping Use    Vaping status: Never Used   Substance Use Topics    Alcohol use: Yes     Comment: socially    Drug use: No     {Provider  If there are gaps in the social history shown above, please follow the link to update and then refresh the note Link to Social and Substance History :685302}  Needs assistance for the following daily activities: no assistance needed  Which of the following safety concerns are present in your home?  none identified   Do you (or your family members) have any concerns about your safety while driving?  No  Do you have any of the following hearing concerns?: No hearing concerns  In the past 6 months, have you been bothered by leaking of urine? No        2023   Social Factors   Worry food won't last until get money to buy more No   Food not last or not have enough money for food? No   Do you have housing? (Housing is defined as stable permanent housing and does not include staying ouside in a car, in a tent, in an abandoned building, in an overnight shelter, or couch-surfing.) Yes   Are you worried about losing your housing? No   Lack of transportation? No   Unable to get utilities (heat,electricity)? No             2024   Fall Risk   Fallen 2 or more times in the past year? No   Trouble with walking or balance? No             {Rooming Staff Patient needs a PHQ as part of the AWV.  Use this link to complete and then refresh the note to pull results Link to PHQ2 Assessment :966256}    Today's PHQ-2 Score:       2024     9:30 AM   PHQ-2 (  [employer])   Q1: Little interest or pleasure in doing things 0   Q2: Feeling down, depressed or hopeless 0   PHQ-2 Score 0        {Mammogram Decision Support  (Optional):959325}      {Link to Fracture Risk Assessment Tool (Optional):800617}    {Provider  REQUIRED FOR AWV Use the storyboard to review patient history, after sections have been marked as reviewed, refresh note to capture documentation:644643}  {Provider   REQUIRED AWV use this link to review and update sexual activity history  after section has been marked as reviewed, refresh note to capture documentation:406813}  Reviewed and updated as needed this visit by Provider                    {HISTORY OPTIONS (Optional):761711}    Current providers sharing in care for this patient include:  Patient Care Team:  Javier Jones MD as PCP - General  Javier Jones MD as Assigned PCP  Rachel Mcdonald PA-C as Physician Assistant (Dermatology)  Jenni Melton MD as MD (Gynecologic Oncology)  Grayson Bonilla MD as MD (Urology)  Shamar Mcmillan MD as Assigned Surgical Provider  Otoniel Perez MD as Assigned Musculoskeletal Provider  Linda Broussard APRN CNP as Assigned Cancer Care Provider    The following health maintenance items are reviewed in Epic and correct as of today:  Health Maintenance   Topic Date Due    RSV VACCINE (1 - 1-dose 75+ series) Never done    ZOSTER IMMUNIZATION (2 of 3) 05/13/2016    DEXA  05/08/2017    DTAP/TDAP/TD IMMUNIZATION (2 - Td or Tdap) 08/01/2022    ANNUAL REVIEW OF HM ORDERS  08/19/2022    MEDICARE ANNUAL WELLNESS VISIT  03/08/2023    INFLUENZA VACCINE (1) 09/01/2024    COVID-19 Vaccine (5 - 2024-25 season) 09/01/2024    BMP  09/23/2025    LIPID  09/23/2025    FALL RISK ASSESSMENT  11/26/2025    ADVANCE CARE PLANNING  11/26/2029    PHQ-2 (once per calendar year)  Completed    Pneumococcal Vaccine: 65+ Years  Completed    HPV IMMUNIZATION  Aged Out    MENINGITIS IMMUNIZATION  Aged Out    RSV MONOCLONAL ANTIBODY  Aged Out       Appropriate preventive services were discussed with this patient, including applicable screening as appropriate for fall  prevention, nutrition, physical activity, Tobacco-use cessation, weight loss and cognition.  Checklist reviewing preventive services available has been given to the patient.           11/26/2024   Mini Cog   Clock Draw Score 2 Normal   3 Item Recall 3 objects recalled   Mini Cog Total Score 5        {A Mini-Cog total score of 0-2 suggests the possibility of dementia, score of 3-5 suggests no dementia:595543}       {additonal problems for provider to add (Optional):961251}    {ROS Picklists (Optional):374137}      Objective    LMP  (LMP Unknown)   There is no height or weight on file to calculate BMI.  Physical Exam   {Exam List (Optional):468157}    {Diagnostic Test Results (Optional):033244}        Signed Electronically by: Javier Jones MD  {Email feedback regarding this note to [email or web address]   :913275}

## 2024-11-26 NOTE — LETTER
[facility]  11/26/24  Patient: Hemalatha Handy  YOB: 1940  Medical Record Number: [id number]                                                                                  Non-Opioid Controlled Substance Agreement    This is an agreement between you and your provider regarding safe and appropriate use of controlled substances prescribed by your care team. Controlled substances are?medicines that can cause physical and mental dependence (abuse).     There are strict laws about having and using these medicines. We here at [facility] are  committed to working with you in your efforts to get better. To support you in this work, we'll help you schedule regular office appointments for medicine refills. If we must cancel or change your appointment for any reason, we'll make sure you have enough medicine to last until your next appointment.     As a Provider, I will:   Listen carefully to your concerns while treating you with respect.   Recommend a treatment plan that I believe is in your best interest and may involve therapies other than medicine.    Talk with you often about the possible benefits and the risk of harm of any medicine that we prescribe for you.  Assess the safety of this medicine and check how well it works.    Provide a plan on how to taper (discontinue or go off) using this medicine if the decision is made to stop its use.      ::  As a Patient, I understand controlled substances:     Are prescribed by my care provider to help me function or work and manage my condition(s).?  Are strong medicines and can cause serious side effects.     Need to be taken exactly as prescribed.?Combining controlled substances with certain medicines or chemicals (such as illegal drugs, alcohol, sedatives, sleeping pills, and benzodiazepines) can be dangerous or even fatal.? If I stop taking my medicines suddenly, I may have severe withdrawal symptoms.     The risks, benefits, and  side effects of these medicine(s) were explained to me. I agree that:    I will take part in other treatments as advised by my care team. This may be psychiatry or counseling, physical therapy, behavioral therapy, group treatment or a referral to specialist.    I will keep all my appointments and understand this is part of the monitoring of controlled substances.?My care team may require an office visit for EVERY controlled substance refill. If I miss appointments or don t follow instructions, my care team may stop my medicine    I will take my medicines as prescribed. I will not change the dose or schedule unless my care team tells me to. There will be no refills if I run out early.      I may be asked to come to the clinic and complete a urine drug test or complete a pill count. If I don t give a urine sample or participate in a pill count, the care team may stop my medicine.    I will only receive controlled substance prescriptions from this clinic. If I am treated by another provider, I will tell them that I am taking controlled substances and that I have a treatment agreement with this provider. I will inform my [facility] care team within one business day if I am given a prescription for any controlled substance by another healthcare provider. My [facility] care team can contact other providers and pharmacists about my use of any medicines.    It is up to me to make sure that I don't run out of my medicines on weekends or holidays.?If my care team is willing to refill my prescription without a visit, I must request refills only during office hours. Refills may take up to 3 business days to process. I will use one pharmacy to fill all my controlled substance prescriptions. I will notify the clinic about any changes to my insurance or medicine availability.    I am responsible for my prescriptions. If the medicine/prescription is lost, stolen or destroyed, it will not be replaced.?I also agree not  to share controlled substance medicines with anyone.     I am aware I should not use any illegal or recreational drugs. I agree not to drink alcohol unless my care team says I can.     If I enroll in the [place] Medical Cannabis program, I will tell my care team before my next refill.    I will tell my care team right away if I become pregnant, have a new medical problem treated outside of my regular clinic, or have a change in my medicines.     I understand that this medicine can affect my thinking, judgment and reaction time.? Alcohol and drugs affect the brain and body, which can affect the safety of my driving. Being under the influence of alcohol or drugs can affect my decision-making, behaviors, personal safety and the safety of others. Driving while impaired (DWI) can occur if a person is driving, operating or in physical control of a car, motorcycle, boat, snowmobile, ATV, motorbike, off-road vehicle or any other motor vehicle ([place] Statute 169A.20). I understand the risk if I choose to drive or operate any vehicle or machinery.    I understand that if I do not follow any of the conditions above, my prescriptions or treatment may be stopped or changed.   I agree that my provider, clinic care team and pharmacy may work with any city, state or federal law enforcement agency that investigates the misuse, sale or other diversion of my controlled medicine. I will allow my provider to discuss my care with, or share a copy of, this agreement with any other treating provider, pharmacy or emergency room where I receive care.     I have read this agreement and have asked questions about anything I did not understand.    ________________________________________________________  Patient Signature - Hemalatha Handy     ___________________                   Date     ________________________________________________________  Provider Signature - Javier Jones MD       ___________________                   Date      ________________________________________________________  Witness Signature (required if provider not present while patient signing)          ___________________                   Date

## 2024-11-26 NOTE — PROGRESS NOTES
Preventive Care Visit  [facility] GWEN Jones MD, Family Medicine  Nov 26, 2024          Dorian Penny is a 84 year old, presenting for the following:  Patient Request (Follow up on low hemoglobin) and Wellness Visit        11/26/2024     9:27 AM   Additional Questions   Roomed by Stephanie Mcfarland           HPI    Feeling tired    Taking iron gummies two pills once daily    Drinking fluids okay    Bowels okay    Happy with wt    Drinking protein drink    Appetite not the best           Health Care Directive  Patient does not have a Health Care Directive: Patient states has Advance Directive and will bring in a copy to clinic.      11/26/2024   General Health   How would you rate your overall physical health? Good   Feel stress (tense, anxious, or unable to sleep) Not at all            11/26/2024   Nutrition   Diet: Regular (no restrictions)            11/26/2024   Exercise   Days per week of moderate/strenous exercise 0 days   Average minutes spent exercising at this level 0 min      (!) EXERCISE CONCERN      11/26/2024   Social Factors   Frequency of gathering with friends or relatives Once a week   Worry food won't last until get money to buy more No   Food not last or not have enough money for food? No   Do you have housing? (Housing is defined as stable permanent housing and does not include staying ouside in a car, in a tent, in an abandoned building, in an overnight shelter, or couch-surfing.) Yes   Are you worried about losing your housing? No   Lack of transportation? No   Unable to get utilities (heat,electricity)? No            11/26/2024   Fall Risk   Fallen 2 or more times in the past year? No     No   Trouble with walking or balance? No     No       Patient-reported    Multiple values from one day are sorted in reverse-chronological order          11/26/2024   Activities of Daily Living- Home Safety   Needs help with the following daily activites None of the above    Safety concerns in the home None of the above            2024   Dental   Dentist two times every year? Yes            2024   Hearing Screening   Hearing concerns? None of the above            2024   Driving Risk Screening   Patient/family members have concerns about driving No            2024   General Alertness/Fatigue Screening   Have you been more tired than usual lately? (!) YES            2024   Urinary Incontinence Screening   Bothered by leaking urine in past 6 months No            2024   TB Screening   Were you born outside of the [place]? No            Today's PHQ-2 Score:       2024     9:35 AM   PHQ-2 (  [employer])   Q1: Little interest or pleasure in doing things 0    Q2: Feeling down, depressed or hopeless 0    PHQ-2 Score 0    Q1: Little interest or pleasure in doing things Not at all   Q2: Feeling down, depressed or hopeless Not at all   PHQ-2 Score 0       Patient-reported           2024   Substance Use   Alcohol more than 3/day or more than 7/wk No   Do you have a current opioid prescription? No   How severe/bad is pain from 1 to 10? 0/10 (No Pain)   Do you use any other substances recreationally? No        Social History     Tobacco Use    Smoking status: Former     Current packs/day: 0.00     Average packs/day: 1 pack/day for 45.0 years (45.0 ttl pk-yrs)     Types: Cigarettes     Start date: 1960     Quit date: 2005     Years since quittin.9     Passive exposure: Past    Smokeless tobacco: Never   Vaping Use    Vaping status: Never Used   Substance Use Topics    Alcohol use: Yes     Comment: socially    Drug use: No          Patient declines any mammograms               Reviewed and updated as needed this visit by Provider                      Current providers sharing in care for this patient include:  Patient Care Team:  Javier Jones MD as PCP - General  Javier Jones MD as Assigned PCP  Rachel Mcdonald PA-C as Physician  "Assistant (Dermatology)  Jenni Melton MD as MD (Gynecologic Oncology)  Grayson Bonilla MD as MD (Urology)  Shamar Mcmillan MD as Assigned Surgical Provider  Otoniel Perez MD as Assigned Musculoskeletal Provider  Linda Broussard APRN CNP as Assigned Cancer Care Provider    The following health maintenance items are reviewed in Epic and correct as of today:  Health Maintenance   Topic Date Due    RSV VACCINE (1 - 1-dose 75+ series) Never done    ZOSTER IMMUNIZATION (2 of 3) 05/13/2016    DEXA  05/08/2017    DTAP/TDAP/TD IMMUNIZATION (2 - Td or Tdap) 08/01/2022    ANNUAL REVIEW OF HM ORDERS  08/19/2022    MEDICARE ANNUAL WELLNESS VISIT  03/08/2023    INFLUENZA VACCINE (1) 09/01/2024    COVID-19 Vaccine (5 - 2024-25 season) 09/01/2024    BMP  09/23/2025    LIPID  09/23/2025    FALL RISK ASSESSMENT  11/26/2025    ADVANCE CARE PLANNING  11/26/2029    PHQ-2 (once per calendar year)  Completed    Pneumococcal Vaccine: 65+ Years  Completed    HPV IMMUNIZATION  Aged Out    MENINGITIS IMMUNIZATION  Aged Out    RSV MONOCLONAL ANTIBODY  Aged Out            Objective    Exam  BP (!) 150/80 (BP Location: Right arm, Patient Position: Chair, Cuff Size: Adult Regular)   Pulse 85   Temp 97.6  F (36.4  C) (Temporal)   Resp 16   Ht 1.552 m (5' 1.1\")   Wt 63.6 kg (140 lb 2 oz)   LMP  (LMP Unknown)   SpO2 97%   BMI 26.39 kg/m     Estimated body mass index is 26.39 kg/m  as calculated from the following:    Height as of this encounter: 1.552 m (5' 1.1\").    Weight as of this encounter: 63.6 kg (140 lb 2 oz).    Physical Exam  GENERAL: alert and no distress  EYES: Eyes grossly normal to inspection, PERRL and conjunctivae and sclerae normal  HENT: ear canals and TM's normal, nose and mouth without ulcers or lesions  NECK: no adenopathy, no asymmetry, masses, or scars  RESP: lungs clear to auscultation - no rales, rhonchi or wheezes  CV: regular rate and rhythm, normal S1 S2, no S3 or S4, no murmur, click or " rub, no peripheral edema  ABDOMEN: soft, nontender, no hepatosplenomegaly, no masses and bowel sounds normal  MS: no gross musculoskeletal defects noted, no edema  SKIN: no suspicious lesions or rashes  NEURO: Normal strength and tone, mentation intact and speech normal  PSYCH: mentation appears normal, affect normal/bright        11/26/2024   Mini Cog   Clock Draw Score 2 Normal   3 Item Recall 3 objects recalled   Mini Cog Total Score 5                 1. Encounter for Medicare annual wellness exam    2. Encounter for immunization    3. Anemia, unspecified type    4. Anxiety    5. Essential hypertension with goal blood pressure less than 140/90      Discussed multiple issues with patient  She thinks she no longer needs the citalopram. Advised weaning off slowly.  Can restart if needed.  She has been on alprazolam for years, no problems  Helps keep her functional  Did controlled substance agreement and checked   Blood pressure high even on recheck systolic but wide pulse pressure  Agreed to keep med as is   Went over recent scope exams  Patient declines small bowel camera  On iron pill  Check labs   Flu and covid shots given here                Signed Electronically by: Javier Jones MD

## 2024-11-27 NOTE — RESULT ENCOUNTER NOTE
Good news.  The hemoglobin ( red blood count ) is back to normal.  Iron level at low end of normal.  Stay on iron pill and then see us in about 3 months.    Other labs are okay.    Javier Jones MD

## 2025-03-16 DIAGNOSIS — F41.9 ANXIETY: ICD-10-CM

## 2025-03-17 ENCOUNTER — OFFICE VISIT (OUTPATIENT)
Dept: FAMILY MEDICINE | Facility: CLINIC | Age: 85
End: 2025-03-17
Payer: COMMERCIAL

## 2025-03-17 VITALS
SYSTOLIC BLOOD PRESSURE: 156 MMHG | OXYGEN SATURATION: 96 % | RESPIRATION RATE: 14 BRPM | HEART RATE: 91 BPM | HEIGHT: 61 IN | WEIGHT: 144.5 LBS | TEMPERATURE: 97.6 F | DIASTOLIC BLOOD PRESSURE: 75 MMHG | BODY MASS INDEX: 27.28 KG/M2

## 2025-03-17 DIAGNOSIS — D64.9 ANEMIA, UNSPECIFIED TYPE: Primary | ICD-10-CM

## 2025-03-17 DIAGNOSIS — L98.9 SKIN LESIONS: ICD-10-CM

## 2025-03-17 DIAGNOSIS — L85.3 DRY SKIN: ICD-10-CM

## 2025-03-17 DIAGNOSIS — L57.3 POIKILODERMA CIVATTE'S: ICD-10-CM

## 2025-03-17 DIAGNOSIS — F41.9 ANXIETY: ICD-10-CM

## 2025-03-17 DIAGNOSIS — L29.9 ITCHING: ICD-10-CM

## 2025-03-17 LAB
BASOPHILS # BLD AUTO: 0 10E3/UL (ref 0–0.2)
BASOPHILS NFR BLD AUTO: 0 %
EOSINOPHIL # BLD AUTO: 0.1 10E3/UL (ref 0–0.7)
EOSINOPHIL NFR BLD AUTO: 2 %
ERYTHROCYTE [DISTWIDTH] IN BLOOD BY AUTOMATED COUNT: 13.6 % (ref 10–15)
HCT VFR BLD AUTO: 43.5 % (ref 35–47)
HGB BLD-MCNC: 13.9 G/DL (ref 11.7–15.7)
IMM GRANULOCYTES # BLD: 0 10E3/UL
IMM GRANULOCYTES NFR BLD: 0 %
LYMPHOCYTES # BLD AUTO: 1.7 10E3/UL (ref 0.8–5.3)
LYMPHOCYTES NFR BLD AUTO: 24 %
MCH RBC QN AUTO: 31 PG (ref 26.5–33)
MCHC RBC AUTO-ENTMCNC: 32 G/DL (ref 31.5–36.5)
MCV RBC AUTO: 97 FL (ref 78–100)
MONOCYTES # BLD AUTO: 0.7 10E3/UL (ref 0–1.3)
MONOCYTES NFR BLD AUTO: 10 %
NEUTROPHILS # BLD AUTO: 4.5 10E3/UL (ref 1.6–8.3)
NEUTROPHILS NFR BLD AUTO: 64 %
PLATELET # BLD AUTO: 221 10E3/UL (ref 150–450)
RBC # BLD AUTO: 4.49 10E6/UL (ref 3.8–5.2)
WBC # BLD AUTO: 7 10E3/UL (ref 4–11)

## 2025-03-17 PROCEDURE — 85025 COMPLETE CBC W/AUTO DIFF WBC: CPT | Performed by: FAMILY MEDICINE

## 2025-03-17 PROCEDURE — 83550 IRON BINDING TEST: CPT | Performed by: FAMILY MEDICINE

## 2025-03-17 PROCEDURE — 99214 OFFICE O/P EST MOD 30 MIN: CPT | Performed by: FAMILY MEDICINE

## 2025-03-17 PROCEDURE — 1126F AMNT PAIN NOTED NONE PRSNT: CPT | Performed by: FAMILY MEDICINE

## 2025-03-17 PROCEDURE — 3077F SYST BP >= 140 MM HG: CPT | Performed by: FAMILY MEDICINE

## 2025-03-17 PROCEDURE — 83540 ASSAY OF IRON: CPT | Performed by: FAMILY MEDICINE

## 2025-03-17 PROCEDURE — 3078F DIAST BP <80 MM HG: CPT | Performed by: FAMILY MEDICINE

## 2025-03-17 PROCEDURE — 36415 COLL VENOUS BLD VENIPUNCTURE: CPT | Performed by: FAMILY MEDICINE

## 2025-03-17 PROCEDURE — 82728 ASSAY OF FERRITIN: CPT | Performed by: FAMILY MEDICINE

## 2025-03-17 RX ORDER — IBUPROFEN 200 MG
200 TABLET ORAL EVERY 4 HOURS PRN
COMMUNITY
Start: 2025-03-17

## 2025-03-17 RX ORDER — CITALOPRAM HYDROBROMIDE 20 MG/1
TABLET ORAL
Qty: 90 TABLET | Refills: 0 | OUTPATIENT
Start: 2025-03-17

## 2025-03-17 RX ORDER — CITALOPRAM HYDROBROMIDE 20 MG/1
20 TABLET ORAL DAILY
Qty: 90 TABLET | Refills: 1 | Status: SHIPPED | OUTPATIENT
Start: 2025-03-17

## 2025-03-17 ASSESSMENT — PAIN SCALES - GENERAL: PAINLEVEL_OUTOF10: NO PAIN (0)

## 2025-03-17 NOTE — PATIENT INSTRUCTIONS
Be generous with moisturizing lotion    Use daily loratadine ( generic claritin ) for the next couple weeks    Stay on celexa    We will send you lab results

## 2025-03-17 NOTE — PROGRESS NOTES
"  Assessment & Plan     (D64.9) Anemia, unspecified type  (primary encounter diagnosis)  Comment: check labs. Patient may be able to stop the iron pill. Had scopes last 2024.   Plan: Iron and iron binding capacity, Ferritin, CBC         with Platelets & Differential             (L29.9) Itching  Comment: urged patient not to scratch/ itch.  Use daily loratadine for 2-3 weeks.  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    Weight as of this encounter: 65.5 kg (144 lb 8 oz).   Weight management plan: Discussed healthy diet and exercise guidelines       2    Subjective   Hemalatha is a 84 year old, presenting for the following health issues:  RECHECK (hemoglobin) and Derm Problem        3/17/2025     2:33 PM   Additional Questions   Roomed by Stephanie Mcfarland     History of Present Illness       Reason for visit:  Hemoglobin and rash   She is taking medications regularly.           Still tired    Motrin about twice per week     Trying to cut back on xanax    Has not taken for 4-5 days    One advil pm at night instead    Lots of salt in diet          Objective    BP (!) 159/80 (BP Location: Left arm, Patient Position: Chair, Cuff Size: Adult Regular)   Pulse 91   Temp 97.6  F (36.4  C) (Temporal)   Resp 14   Ht 1.552 m (5' 1.1\")   Wt 65.5 kg (144 lb 8 oz)   LMP  (LMP Unknown)   SpO2 96%   BMI 27.21 kg/m    Body mass index is 27.21 kg/m .  Physical Exam  Constitutional:       Appearance: She is " well-developed.   HENT:      Head: Normocephalic and atraumatic.   Eyes:      Conjunctiva/sclera: Conjunctivae normal.   Neck:      Vascular: No carotid bruit.   Cardiovascular:      Rate and Rhythm: Normal rate and regular rhythm.      Heart sounds: Normal heart sounds.   Pulmonary:      Effort: Pulmonary effort is normal. No respiratory distress.      Breath sounds: Normal breath sounds.   Neurological:      Mental Status: She is alert and oriented to person, place, and time.      No edema    Radials symmetric    Patient has poikiloderma of civatte upper chest/ neck    Fairly dry skin throughout    Very faint nonspecific blanchable type rash upper chest    No cellulitis                    Signed Electronically by: Javier Jones MD

## 2025-03-18 LAB
FERRITIN SERPL-MCNC: 39 NG/ML (ref 11–328)
IRON BINDING CAPACITY (ROCHE): 378 UG/DL (ref 240–430)
IRON SATN MFR SERPL: 28 % (ref 15–46)
IRON SERPL-MCNC: 107 UG/DL (ref 37–145)

## 2025-03-18 NOTE — RESULT ENCOUNTER NOTE
Good news.  Red blood count ( hemoglobin ) and iron levels back to normal.    You can stop the iron pill.    Javier Jones MD

## 2025-03-19 ENCOUNTER — OFFICE VISIT (OUTPATIENT)
Dept: FAMILY MEDICINE | Facility: CLINIC | Age: 85
End: 2025-03-19
Payer: COMMERCIAL

## 2025-03-19 VITALS
RESPIRATION RATE: 21 BRPM | DIASTOLIC BLOOD PRESSURE: 72 MMHG | BODY MASS INDEX: 27.12 KG/M2 | OXYGEN SATURATION: 96 % | WEIGHT: 144 LBS | SYSTOLIC BLOOD PRESSURE: 140 MMHG | TEMPERATURE: 97.9 F | HEART RATE: 92 BPM

## 2025-03-19 DIAGNOSIS — I10 ESSENTIAL HYPERTENSION WITH GOAL BLOOD PRESSURE LESS THAN 140/90: ICD-10-CM

## 2025-03-19 DIAGNOSIS — J01.90 ACUTE SINUSITIS WITH SYMPTOMS > 10 DAYS: Primary | ICD-10-CM

## 2025-03-19 DIAGNOSIS — L98.9 SKIN LESIONS: ICD-10-CM

## 2025-03-19 PROCEDURE — 3078F DIAST BP <80 MM HG: CPT | Performed by: NURSE PRACTITIONER

## 2025-03-19 PROCEDURE — 1125F AMNT PAIN NOTED PAIN PRSNT: CPT | Performed by: NURSE PRACTITIONER

## 2025-03-19 PROCEDURE — 3077F SYST BP >= 140 MM HG: CPT | Performed by: NURSE PRACTITIONER

## 2025-03-19 PROCEDURE — 99214 OFFICE O/P EST MOD 30 MIN: CPT | Performed by: NURSE PRACTITIONER

## 2025-03-19 RX ORDER — TRIAMCINOLONE ACETONIDE 1 MG/G
CREAM TOPICAL 2 TIMES DAILY
Qty: 45 G | Refills: 1 | Status: SHIPPED | OUTPATIENT
Start: 2025-03-19 | End: 2025-03-29

## 2025-03-19 RX ORDER — CEFDINIR 300 MG/1
300 CAPSULE ORAL 2 TIMES DAILY
Qty: 20 CAPSULE | Refills: 0 | Status: SHIPPED | OUTPATIENT
Start: 2025-03-19 | End: 2025-03-29

## 2025-03-19 ASSESSMENT — ENCOUNTER SYMPTOMS
FEVER: 0
SINUS PRESSURE: 1
COUGH: 0
SINUS PAIN: 1
RHINORRHEA: 1

## 2025-03-19 ASSESSMENT — PAIN SCALES - GENERAL: PAINLEVEL_OUTOF10: MODERATE PAIN (6)

## 2025-03-19 NOTE — PROGRESS NOTES
Assessment & Plan     1. Acute sinusitis with symptoms > 10 days (Primary)    Patient is afebrile and in no acute distress with clear lung sounds.  Nasal congestion and rhinorrhea noted.    Discussed trial of cefdinir, pt in agreement. Follow-up if symptoms worsen/fail to improve. Continue loratadine.    - cefdinir (OMNICEF) 300 MG capsule; Take 1 capsule (300 mg) by mouth 2 times daily for 10 days.  Dispense: 20 capsule; Refill: 0    2. Skin lesions    Scant papules noted without abscesses to upper chest/neck. Some excoriation marks in bilateral trapezius.     Continue loratadine, add triamcinolone.     - triamcinolone (KENALOG) 0.1 % external cream; Apply topically 2 times daily for 10 days.  Dispense: 45 g; Refill: 1    3. Essential hypertension with goal blood pressure less than 140/90  BP above goal. Pt admits to lack of exercise/high Na+ diet. Encouraged lifestyle modifications and recheck in 1-2 months.     Follow-up in 1-2 months for repeat BP, sooner if needed.    All questions/concerns addressed. Patient stated understanding/agreement to plan of care.        Note: Chart documentation was done in part with Dragon Voice Recognition software.  Although reviewed after completion, some word and grammatical errors may remain. Please contact author for any clarification or concerns.                Patient Instructions   Start antibiotics and steroid cream for upper chest/neck lesions.  Skin lesions can take 2-3 weeks to heal.   Try to limit sodium in your diet and increase exercise as tolerated.    Follow-up if symptoms worsen/fail to improve.      Dorian Penny is a 84 year old, presenting for the following health issues:  Derm Problem (On and around neck headache)      3/19/2025     1:36 PM   Additional Questions   Roomed by Isela   Accompanied by self         3/19/2025     1:36 PM   Patient Reported Additional Medications   Patient reports taking the following new medications none     History of  Present Illness       Reason for visit:  Hemoglobin and rash   She is taking medications regularly.          Rash  Onset/Duration: 2 weeks  Description  Location: neck  Character: blotchy, red  Itching: moderate  Intensity:  severe  Progression of Symptoms:  worsening  Accompanying signs and symptoms:   Fever: No  Body aches or joint pain: No  Sore throat symptoms: No  Recent cold symptoms: Runny nose and headache  History:           Previous episodes of similar rash: None  New exposures:  None  Recent travel: No  Exposure to similar rash: No  Precipitating or alleviating factors: nothing  Therapies tried and outcome: Claritin/loratadine - helps itching but lesions persist.    Also reports that she has had runny nose for 3 months. Reports sinus headache. Denies hx seasonal allergies.   No other acute concerns/symptoms at time of exam.      Review of Systems   Constitutional:  Negative for fever.   HENT:  Positive for rhinorrhea, sinus pressure and sinus pain.    Respiratory:  Negative for cough.    Constitutional, HEENT, cardiovascular, pulmonary, gi and gu systems are negative, except as otherwise noted.              Past Medical History:   Diagnosis Date    Actinic keratosis     Anxiety     Basal cell carcinoma pt unsure    abdomen, and elsewhere    Cervical cancer (H) early 1960's    stage 3 txd.  she did not have hysterectomy    Colorectal polyps 01/01/2008    1 Polyp    Gastroesophageal reflux disease without esophagitis     HLD (hyperlipidemia)     Hypertension     Vulvar cancer (H) 11/2022       Past Surgical History:   Procedure Laterality Date    COLONOSCOPY  09/18/2008    COLONOSCOPY WITH CO2 INSUFFLATION N/A 11/5/2024    Procedure: Colonoscopy with CO2 insufflation;  Surgeon: Rachel Duran DO;  Location: [facility] OR    COMBINED ESOPHAGOSCOPY, GASTROSCOPY, DUODENOSCOPY (EGD) WITH CO2 INSUFFLATION N/A 11/5/2024    Procedure: Combined Esophagoscopy, Gastroscopy, Duodenoscopy (Egd) With Co2 Insufflation;   Surgeon: Rachel Duran DO;  Location: [facility] OR    ENT SURGERY  2012    Left lower lip lesion    ESOPHAGOSCOPY, GASTROSCOPY, DUODENOSCOPY (EGD), COMBINED N/A 2024    Procedure: ESOPHAGOGASTRODUODENOSCOPY, WITH BIOPSY;  Surgeon: Rachel Duran DO;  Location: [facility] OR    EXCISE VULVA WIDE LOCAL Left 2023    Procedure: left pelvic exam  under anesthesia, wide local excision left vulva;  Surgeon: Jenni Melton MD;  Location: [facility] OR       Family History   Problem Relation Age of Onset    Hypertension Mother     Hypertension Brother     Cancer Son         hodgkins    C.A.D. No family hx of     Cerebrovascular Disease No family hx of     Anesthesia Reaction No family hx of     Deep Vein Thrombosis (DVT) No family hx of        Social History     Tobacco Use    Smoking status: Former     Current packs/day: 0.00     Average packs/day: 1 pack/day for 45.0 years (45.0 ttl pk-yrs)     Types: Cigarettes     Start date: 1960     Quit date: 2005     Years since quittin.2     Passive exposure: Past    Smokeless tobacco: Never   Substance Use Topics    Alcohol use: Yes     Comment: socially           Current Outpatient Medications   Medication Sig Dispense Refill    ALPRAZolam (XANAX) 0.25 MG tablet TAKE 1 TABLET(0.25 MG) BY MOUTH THREE TIMES DAILY AS NEEDED FOR ANXIETY 30 tablet 0    citalopram (CELEXA) 20 MG tablet Take 1 tablet (20 mg) by mouth daily. 90 tablet 1    ibuprofen (ADVIL/MOTRIN) 200 MG tablet Take 1 tablet (200 mg) by mouth every 4 hours as needed for pain.      lisinopril (ZESTRIL) 20 MG tablet Take 1 tablet (20 mg) by mouth daily. 100 tablet 1    multivitamin w/minerals (THERA-VIT-M) tablet Take 1 tablet by mouth daily       No current facility-administered medications for this visit.           Objective        BP (!) 144/74 (BP Location: Right arm, Patient Position: Sitting, Cuff Size: Adult Regular)   Pulse 92   Temp 97.9  F (36.6  C) (Temporal)   Resp 21   Wt 65.3 kg (144 lb)    LMP  (LMP Unknown)   SpO2 96%   BMI 27.12 kg/m        Physical Exam  Constitutional:       General: She is not in acute distress.     Appearance: She is not ill-appearing.   HENT:      Right Ear: Tympanic membrane normal.      Left Ear: Tympanic membrane normal.      Nose: Congestion and rhinorrhea present.      Right Sinus: No maxillary sinus tenderness or frontal sinus tenderness.      Left Sinus: No maxillary sinus tenderness or frontal sinus tenderness.      Mouth/Throat:      Pharynx: Oropharynx is clear.   Eyes:      Extraocular Movements: Extraocular movements intact.      Pupils: Pupils are equal, round, and reactive to light.   Cardiovascular:      Rate and Rhythm: Normal rate and regular rhythm.      Heart sounds: Normal heart sounds. No murmur heard.  Pulmonary:      Effort: Pulmonary effort is normal. No respiratory distress.      Breath sounds: Normal breath sounds. No wheezing or rales.   Musculoskeletal:      Cervical back: Neck supple.      Right lower leg: No edema.      Left lower leg: No edema.   Lymphadenopathy:      Cervical: No cervical adenopathy.   Skin:     Findings: Rash present. No abscess or erythema. Rash is papular.      Comments: Scant papules noted without abscesses to upper chest/neck. Some excoriation marks in bilateral trapezius.     Poikiloderma of civatte noted.   Neurological:      General: No focal deficit present.      Mental Status: She is alert.   Psychiatric:         Thought Content: Thought content normal.         Judgment: Judgment normal.                Signed Electronically by: SATHYA De Paz CNP

## 2025-03-19 NOTE — PATIENT INSTRUCTIONS
Start antibiotics and steroid cream for upper chest/neck lesions.  Skin lesions can take 2-3 weeks to heal.   Try to limit sodium in your diet and increase exercise as tolerated.    Follow-up if symptoms worsen/fail to improve.

## 2025-04-21 ENCOUNTER — TRANSFERRED RECORDS (OUTPATIENT)
Dept: HEALTH INFORMATION MANAGEMENT | Facility: CLINIC | Age: 85
End: 2025-04-21
Payer: COMMERCIAL

## 2025-04-28 DIAGNOSIS — I10 ESSENTIAL HYPERTENSION WITH GOAL BLOOD PRESSURE LESS THAN 140/90: ICD-10-CM

## 2025-04-28 RX ORDER — LISINOPRIL 20 MG/1
20 TABLET ORAL DAILY
Qty: 100 TABLET | Refills: 1 | Status: SHIPPED | OUTPATIENT
Start: 2025-04-28

## 2025-04-28 NOTE — TELEPHONE ENCOUNTER
Per our records, lisinopril last filled 1/8/25 for 100 days and is 10 days late to refill.      Needs an updated prescription so routed to pharmacy.     Julia Hong, PharmD, [facility]  Retail Pharmacy Specialist  [phone]

## 2025-05-25 ENCOUNTER — HEALTH MAINTENANCE LETTER (OUTPATIENT)
Age: 85
End: 2025-05-25

## 2025-06-09 ENCOUNTER — TELEPHONE (OUTPATIENT)
Dept: FAMILY MEDICINE | Facility: CLINIC | Age: 85
End: 2025-06-09

## 2025-06-09 ENCOUNTER — OFFICE VISIT (OUTPATIENT)
Dept: FAMILY MEDICINE | Facility: CLINIC | Age: 85
End: 2025-06-09
Payer: COMMERCIAL

## 2025-06-09 VITALS
SYSTOLIC BLOOD PRESSURE: 148 MMHG | WEIGHT: 145 LBS | TEMPERATURE: 98.4 F | RESPIRATION RATE: 18 BRPM | DIASTOLIC BLOOD PRESSURE: 82 MMHG | HEART RATE: 108 BPM | BODY MASS INDEX: 27.31 KG/M2 | OXYGEN SATURATION: 97 %

## 2025-06-09 DIAGNOSIS — J01.90 ACUTE SINUSITIS WITH SYMPTOMS > 10 DAYS: Primary | ICD-10-CM

## 2025-06-09 DIAGNOSIS — D64.9 ANEMIA, UNSPECIFIED TYPE: ICD-10-CM

## 2025-06-09 DIAGNOSIS — M26.609 TEMPOROMANDIBULAR JOINT DISORDER: ICD-10-CM

## 2025-06-09 LAB
ERYTHROCYTE [DISTWIDTH] IN BLOOD BY AUTOMATED COUNT: 12.6 % (ref 10–15)
HCT VFR BLD AUTO: 44.2 % (ref 35–47)
HGB BLD-MCNC: 14.1 G/DL (ref 11.7–15.7)
MCH RBC QN AUTO: 31.1 PG (ref 26.5–33)
MCHC RBC AUTO-ENTMCNC: 31.9 G/DL (ref 31.5–36.5)
MCV RBC AUTO: 97 FL (ref 78–100)
PLATELET # BLD AUTO: 210 10E3/UL (ref 150–450)
RBC # BLD AUTO: 4.54 10E6/UL (ref 3.8–5.2)
WBC # BLD AUTO: 6.9 10E3/UL (ref 4–11)

## 2025-06-09 PROCEDURE — 99214 OFFICE O/P EST MOD 30 MIN: CPT | Performed by: NURSE PRACTITIONER

## 2025-06-09 PROCEDURE — 85027 COMPLETE CBC AUTOMATED: CPT | Performed by: NURSE PRACTITIONER

## 2025-06-09 PROCEDURE — 3077F SYST BP >= 140 MM HG: CPT | Performed by: NURSE PRACTITIONER

## 2025-06-09 PROCEDURE — 1125F AMNT PAIN NOTED PAIN PRSNT: CPT | Performed by: NURSE PRACTITIONER

## 2025-06-09 PROCEDURE — 83550 IRON BINDING TEST: CPT | Performed by: NURSE PRACTITIONER

## 2025-06-09 PROCEDURE — 83540 ASSAY OF IRON: CPT | Performed by: NURSE PRACTITIONER

## 2025-06-09 PROCEDURE — 3079F DIAST BP 80-89 MM HG: CPT | Performed by: NURSE PRACTITIONER

## 2025-06-09 PROCEDURE — 82728 ASSAY OF FERRITIN: CPT | Performed by: NURSE PRACTITIONER

## 2025-06-09 PROCEDURE — 36415 COLL VENOUS BLD VENIPUNCTURE: CPT | Performed by: NURSE PRACTITIONER

## 2025-06-09 RX ORDER — DOXYCYCLINE HYCLATE 100 MG
100 TABLET ORAL 2 TIMES DAILY
Qty: 20 TABLET | Refills: 0 | Status: SHIPPED | OUTPATIENT
Start: 2025-06-09 | End: 2025-06-19

## 2025-06-09 RX ORDER — FLUTICASONE PROPIONATE 50 MCG
1 SPRAY, SUSPENSION (ML) NASAL DAILY
Qty: 11.1 ML | Refills: 0 | Status: SHIPPED | OUTPATIENT
Start: 2025-06-09

## 2025-06-09 ASSESSMENT — ENCOUNTER SYMPTOMS
SHORTNESS OF BREATH: 0
CHILLS: 0
FEVER: 0
COUGH: 0

## 2025-06-09 ASSESSMENT — PAIN SCALES - GENERAL: PAINLEVEL_OUTOF10: MILD PAIN (2)

## 2025-06-09 NOTE — PROGRESS NOTES
Assessment & Plan     1. Acute sinusitis with symptoms > 10 days (Primary)    Sinus pressure x 3 weeks, predominately in maxillary region.  Had sinusitis in 3/2025- marginal improvement with cefdinir.  Intermittent discomfort in L ear.  Had dental abscess, took amoxicillin last month.  Tx- loratadine -not helpful.    Patient is afebrile and in no acute distress with clear lung sounds. Mild tachycardia and congestion noted.    Since she took amxoicillin last month and did not see improvement with cefdinir, will start trial of doxycyline. Will also start flonase in case allergic rhinitis is contributory.  I discussed with the patient risks and benefits of the new medication prescribed including potential side effects.  The patient had opportunity to ask questions and is comfortable with and interested in medications as prescribed.           - doxycycline hyclate (VIBRA-TABS) 100 MG tablet; Take 1 tablet (100 mg) by mouth 2 times daily for 10 days.  Dispense: 20 tablet; Refill: 0  - fluticasone (FLONASE) 50 MCG/ACT nasal spray; Spray 1 spray into both nostrils daily.  Dispense: 11.1 mL; Refill: 0    2. Temporomandibular joint disorder  Intermittent symptoms. Start with supportive care including intermittent heat/ice when symptoms escalate.    3. Anemia, unspecified type  - CBC with platelets; Future  - Ferritin; Future  - Iron and iron binding capacity; Future    Follow-up if symptoms worsen/fail to improve.    All questions/concerns addressed. Patient stated understanding/agreement to plan of care.        Note: Chart documentation was done in part with Dragon Voice Recognition software.  Although reviewed after completion, some word and grammatical errors may remain. Please contact author for any clarification or concerns.                Dorian Penny is a 85 year old, presenting for the following health issues:  Sinus Problem (Pressure in head and eyes for 3 weeks)      6/9/2025     3:59 PM   Additional  "Questions   Roomed by Isela   Accompanied by self         6/9/2025     3:59 PM   Patient Reported Additional Medications   Patient reports taking the following new medications none     History of Present Illness       Headaches:   Since the patient's last clinic visit, headaches are: worsened  The patient is getting headaches:  Daily  She is able to do normal daily activities when she has a migraine.  The patient is taking the following rescue/relief medications:  Ibuprofen (Advil, Motrin) and Tylenol   Patient states \"I get only a small amount of relief\" from the rescue/relief medications.   The patient is taking the following medications to prevent migraines:  No medications to prevent migraines  In the past 4 weeks, the patient has gone to an Urgent Care or Emergency Room 0 times times due to headaches.   She is taking medications regularly.          Sinus pressure x 3 weeks, predominately in maxillary region.  Had sinusitis in 3/2025- marginal improvement with cefdinir.  Intermittent discomfort in L ear.  Had dental abscess, took amoxicillin last month.  Tx- loratadine -not helpful.    Hx anemia- off Fe. Would like recheck.  No other acute concerns/symptoms at time of exam.        Review of Systems   Constitutional:  Negative for chills and fever.   HENT:  Positive for congestion.    Respiratory:  Negative for cough and shortness of breath.    Cardiovascular:  Negative for chest pain.   Constitutional, HEENT, cardiovascular, pulmonary, gi and gu systems are negative, except as otherwise noted.    Past Medical History:   Diagnosis Date     Actinic keratosis      Anxiety      Basal cell carcinoma pt unsure    abdomen, and elsewhere     Cervical cancer (H) early 1960's    stage 3 txd.  she did not have hysterectomy     Colorectal polyps 01/01/2008    1 Polyp     Gastroesophageal reflux disease without esophagitis      HLD (hyperlipidemia)      Hypertension      Vulvar cancer (H) 11/2022       Past Surgical " History:   Procedure Laterality Date     COLONOSCOPY  2008     COLONOSCOPY WITH CO2 INSUFFLATION N/A 2024    Procedure: Colonoscopy with CO2 insufflation;  Surgeon: Rachel Duran DO;  Location: [facility] OR     COMBINED ESOPHAGOSCOPY, GASTROSCOPY, DUODENOSCOPY (EGD) WITH CO2 INSUFFLATION N/A 2024    Procedure: Combined Esophagoscopy, Gastroscopy, Duodenoscopy (Egd) With Co2 Insufflation;  Surgeon: Rachel Duran DO;  Location: [facility] OR     ENT SURGERY  2012    Left lower lip lesion     ESOPHAGOSCOPY, GASTROSCOPY, DUODENOSCOPY (EGD), COMBINED N/A 2024    Procedure: ESOPHAGOGASTRODUODENOSCOPY, WITH BIOPSY;  Surgeon: Rachel Duran DO;  Location: [facility] OR     EXCISE VULVA WIDE LOCAL Left 2023    Procedure: left pelvic exam  under anesthesia, wide local excision left vulva;  Surgeon: Jenni Melton MD;  Location: [facility] OR       Family History   Problem Relation Age of Onset     Hypertension Mother      Hypertension Brother      Cancer Son         hodgkins     C.A.D. No family hx of      Cerebrovascular Disease No family hx of      Anesthesia Reaction No family hx of      Deep Vein Thrombosis (DVT) No family hx of        Social History     Tobacco Use     Smoking status: Former     Current packs/day: 0.00     Average packs/day: 1 pack/day for 45.0 years (45.0 ttl pk-yrs)     Types: Cigarettes     Start date: 1960     Quit date: 2005     Years since quittin.4     Passive exposure: Past     Smokeless tobacco: Never   Substance Use Topics     Alcohol use: Yes     Comment: socially           Current Outpatient Medications   Medication Sig Dispense Refill     ALPRAZolam (XANAX) 0.25 MG tablet TAKE 1 TABLET(0.25 MG) BY MOUTH THREE TIMES DAILY AS NEEDED FOR ANXIETY 30 tablet 0     ibuprofen (ADVIL/MOTRIN) 200 MG tablet Take 1 tablet (200 mg) by mouth every 4 hours as needed for pain.       lisinopril (ZESTRIL) 20 MG tablet Take 1 tablet (20 mg) by mouth daily. 100 tablet 1      multivitamin w/minerals (THERA-VIT-M) tablet Take 1 tablet by mouth daily       No current facility-administered medications for this visit.             Objective        BP (!) 148/82 (BP Location: Left arm, Patient Position: Sitting, Cuff Size: Adult Regular)   Pulse 108   Temp 98.4  F (36.9  C) (Oral)   Resp 18   Wt 65.8 kg (145 lb)   LMP  (LMP Unknown)   SpO2 97%   BMI 27.31 kg/m        Physical Exam  Constitutional:       General: She is not in acute distress.     Appearance: She is not ill-appearing.   HENT:      Head:      Comments: Pain reported when palpating L TMJ while opening/closing jaw.     Right Ear: Tympanic membrane normal.      Left Ear: Tympanic membrane normal.      Nose: Congestion present.      Mouth/Throat:      Pharynx: Oropharynx is clear.   Eyes:      Extraocular Movements: Extraocular movements intact.      Pupils: Pupils are equal, round, and reactive to light.   Cardiovascular:      Rate and Rhythm: Regular rhythm. Tachycardia present.      Heart sounds: Normal heart sounds. No murmur heard.  Pulmonary:      Effort: Pulmonary effort is normal. No respiratory distress.      Breath sounds: Normal breath sounds. No wheezing or rales.   Musculoskeletal:      Cervical back: Neck supple.      Right lower leg: No edema.      Left lower leg: No edema.   Lymphadenopathy:      Cervical: No cervical adenopathy.   Neurological:      General: No focal deficit present.      Mental Status: She is alert.   Psychiatric:         Thought Content: Thought content normal.              Signed Electronically by: SATHYA De Paz CNP

## 2025-06-10 LAB
FERRITIN SERPL-MCNC: 45 NG/ML (ref 11–328)
IRON BINDING CAPACITY (ROCHE): 344 UG/DL (ref 240–430)
IRON SATN MFR SERPL: 23 % (ref 15–46)
IRON SERPL-MCNC: 78 UG/DL (ref 37–145)

## 2025-06-11 ENCOUNTER — RESULTS FOLLOW-UP (OUTPATIENT)
Dept: FAMILY MEDICINE | Facility: CLINIC | Age: 85
End: 2025-06-11

## 2025-06-24 ENCOUNTER — TELEPHONE (OUTPATIENT)
Dept: FAMILY MEDICINE | Facility: CLINIC | Age: 85
End: 2025-06-24
Payer: COMMERCIAL

## 2025-06-24 NOTE — TELEPHONE ENCOUNTER
Patientcalling to request appointment as she was not feeling well.    Is bloated and having constipation and then diarrhea.    Has some heartburn and nausea after eating.    Wondering if she may have a UTI/    Schedule appointment 6/25/25 to be evaluated    Christine M Klisch, RN

## 2025-06-25 ENCOUNTER — OFFICE VISIT (OUTPATIENT)
Dept: FAMILY MEDICINE | Facility: CLINIC | Age: 85
End: 2025-06-25
Payer: COMMERCIAL

## 2025-06-25 VITALS
TEMPERATURE: 98.8 F | HEIGHT: 61 IN | HEART RATE: 82 BPM | WEIGHT: 143.2 LBS | OXYGEN SATURATION: 98 % | DIASTOLIC BLOOD PRESSURE: 82 MMHG | RESPIRATION RATE: 16 BRPM | SYSTOLIC BLOOD PRESSURE: 144 MMHG | BODY MASS INDEX: 27.03 KG/M2

## 2025-06-25 DIAGNOSIS — K04.7 DENTAL ABSCESS: ICD-10-CM

## 2025-06-25 DIAGNOSIS — K30 INDIGESTION: ICD-10-CM

## 2025-06-25 DIAGNOSIS — C15.9 MALIGNANT NEOPLASM OF ESOPHAGUS, UNSPECIFIED LOCATION (H): Primary | ICD-10-CM

## 2025-06-25 DIAGNOSIS — R19.7 DIARRHEA, UNSPECIFIED TYPE: ICD-10-CM

## 2025-06-25 PROCEDURE — 3079F DIAST BP 80-89 MM HG: CPT

## 2025-06-25 PROCEDURE — 99214 OFFICE O/P EST MOD 30 MIN: CPT

## 2025-06-25 PROCEDURE — 3077F SYST BP >= 140 MM HG: CPT

## 2025-06-25 PROCEDURE — G2211 COMPLEX E/M VISIT ADD ON: HCPCS

## 2025-06-25 RX ORDER — SACCHAROMYCES BOULARDII 250 MG
250 CAPSULE ORAL 2 TIMES DAILY
Qty: 30 CAPSULE | Refills: 0 | Status: SHIPPED | OUTPATIENT
Start: 2025-06-25

## 2025-06-25 ASSESSMENT — ENCOUNTER SYMPTOMS: DIARRHEA: 1

## 2025-06-25 NOTE — PROGRESS NOTES
Assessment & Plan     Diarrhea, unspecified type  Improving, suspect 2/2 ABX use versus OTC laxative  Education provided on future OTC treatments for constipation    Indigestion  Improving  - saccharomyces boulardii (FLORASTOR) 250 MG capsule  Dispense: 30 capsule; Refill: 0  - alum & mag hydroxide-simethicone (MYLANTA ES/MAALOX  ES) 400-400-40 MG/5ML SUSP  Dispense: 355 mL; Refill: 0    Dental abscess  Scheduled for removal via dentistry    Malignant neoplasm of esophagus, unspecified location (H)  Noted on chart review, EGD done November 2024  Patient reports she was told no follow-up was needed  -Would keep in differential if symptoms not continuing to improve      Follow-up if symptoms worsen or fail to continue to improve          Subjective   Hemalatha is a 85 year old, presenting for the following health issues:    Patient reports for approximately 1 week of indigestion.  Initially started with bloating and constipation, she then took several over-the-counter laxatives (describes Dulcolax, senna, MiraLAX, and Colace).  This resulted in several days of diarrhea and abdominal cramping.  This has gradually improved and she reports her stool pattern has nearly returned to baseline but is still experiencing some ongoing GI discomfort.    Of note-has taken amoxicillin and doxycycline in the past month for a dental abscess and sinusitis respectively.  We discussed how frequent antibiotics can disturb GI patti.  Agreeable to probiotic.    Regarding dental abscess-patient was evaluated several weeks ago by her dentist and is scheduled for a tooth removal on Monday.    Denies-CP, SOB, vomiting, blood in stool, worsening abdominal pain, fevers, chills, dysuria, changes in urinary habits      Diarrhea (Thought she was backed up and took OTC relief and that caused the diarrhea), Dental Problem (Abscess ), and Bloated (Ongoing for about two weeks )        6/25/2025     9:40 AM   Additional Questions   Roomed by alondra  "    Diarrhea    Dental Problem    History of Present Illness       Reason for visit:  Stomach   She is taking medications regularly.                      Objective    BP (!) 144/82   Pulse 82   Temp 98.8  F (37.1  C) (Temporal)   Resp 16   Ht 1.549 m (5' 1\")   Wt 65 kg (143 lb 3.2 oz)   LMP  (LMP Unknown)   SpO2 98%   BMI 27.06 kg/m    Body mass index is 27.06 kg/m .  Physical Exam   GENERAL: healthy, alert and no distress  EYES: Eyes grossly normal to inspection, PERRL and conjunctivae and sclerae normal  Neck: No visible JVD or lymphadenopathy   RESP: symmetrical rise in chest   CV: No peripheral edema notable   MS: no gross musculoskeletal defects noted  SKIN: no suspicious lesions or rashes  PSYCH: mentation appears normal, affect normal/bright              Signed Electronically by: SAHTYA Barone CNP  The longitudinal plan of care for the diagnosis(es)/condition(s) as documented were addressed during this visit. Due to the added complexity in care, I will continue to support Hemalatha in the subsequent management and with ongoing continuity of care.    "

## 2025-07-18 ENCOUNTER — TELEPHONE (OUTPATIENT)
Dept: FAMILY MEDICINE | Facility: CLINIC | Age: 85
End: 2025-07-18
Payer: COMMERCIAL

## 2025-07-18 NOTE — TELEPHONE ENCOUNTER
Reason for Call:  Appointment Request    Patient requesting this type of appt:  Hospital/ED Follow-Up     Requested provider: Javier Jones    Reason patient unable to be scheduled: Not within requested timeframe    When does patient want to be seen/preferred time: sometime next week    Comments: patient did schedule for 7/29 but she said her sinus stuff is not improving and is not feeling well at all. Sinus headaches that don't go away, fatigue     Could we send this information to you in [facility] or would you prefer to receive a phone call?:   Patient would prefer a phone call   Okay to leave a detailed message?: Yes at Cell number on file:    Telephone Information:   Mobile [phone]       Call taken on 7/18/2025 at 3:03 PM by Beatriz Shepard

## 2025-07-21 NOTE — TELEPHONE ENCOUNTER
Patient called.  Appointment scheduled for tomorrow with Dr. Jones at 11:40 am.  Daphnie Padilla,

## 2025-07-22 ENCOUNTER — OFFICE VISIT (OUTPATIENT)
Dept: FAMILY MEDICINE | Facility: CLINIC | Age: 85
End: 2025-07-22
Payer: COMMERCIAL

## 2025-07-22 VITALS
HEIGHT: 61 IN | BODY MASS INDEX: 27.21 KG/M2 | WEIGHT: 144.13 LBS | RESPIRATION RATE: 14 BRPM | HEART RATE: 86 BPM | SYSTOLIC BLOOD PRESSURE: 167 MMHG | TEMPERATURE: 97.8 F | OXYGEN SATURATION: 96 % | DIASTOLIC BLOOD PRESSURE: 90 MMHG

## 2025-07-22 DIAGNOSIS — J34.89 SINUS PRESSURE: ICD-10-CM

## 2025-07-22 DIAGNOSIS — Z91.09 ENVIRONMENTAL ALLERGIES: ICD-10-CM

## 2025-07-22 DIAGNOSIS — K31.9 GASTROPATHY: ICD-10-CM

## 2025-07-22 DIAGNOSIS — F41.9 ANXIETY: Primary | ICD-10-CM

## 2025-07-22 DIAGNOSIS — I10 HYPERTENSION GOAL BP (BLOOD PRESSURE) < 140/90: ICD-10-CM

## 2025-07-22 PROCEDURE — 3080F DIAST BP >= 90 MM HG: CPT | Performed by: FAMILY MEDICINE

## 2025-07-22 PROCEDURE — 3077F SYST BP >= 140 MM HG: CPT | Performed by: FAMILY MEDICINE

## 2025-07-22 PROCEDURE — 1126F AMNT PAIN NOTED NONE PRSNT: CPT | Performed by: FAMILY MEDICINE

## 2025-07-22 PROCEDURE — 99214 OFFICE O/P EST MOD 30 MIN: CPT | Performed by: FAMILY MEDICINE

## 2025-07-22 RX ORDER — CITALOPRAM HYDROBROMIDE 20 MG/1
20 TABLET ORAL DAILY
Qty: 30 TABLET | Refills: 1 | Status: SHIPPED | OUTPATIENT
Start: 2025-07-22

## 2025-07-22 RX ORDER — AMLODIPINE BESYLATE 5 MG/1
5 TABLET ORAL DAILY
Qty: 30 TABLET | Refills: 1 | Status: SHIPPED | OUTPATIENT
Start: 2025-07-22

## 2025-07-22 ASSESSMENT — PAIN SCALES - GENERAL: PAINLEVEL_OUTOF10: NO PAIN (0)

## 2025-07-22 NOTE — PATIENT INSTRUCTIONS
Stay on lisinopril    Start amlodipine 5 mg reagan ( 2nd  blood pressure med )    Restart citalopram/ celexa one daily    Take loratadine ( claritin ) and flonase daily     Call in a few weeks if head symptoms not better    Plan to see us  in a couple months,  sooner if needed

## 2025-07-22 NOTE — PROGRESS NOTES
"  Assessment & Plan     (F41.9) Anxiety  (primary encounter diagnosis)  Comment: patient feeling sad.  Discussed in detail.  Restart citalopram. She is decreasing use of the alprazolam.  Plan: citalopram (CELEXA) 20 MG tablet             (I10) Hypertension goal BP (blood pressure) < 140/90  Comment: needs 2nd blood pressure med.  Interesting to see if headache/ sinus symptoms improve with better blood pressure control  Plan: amLODIPine (NORVASC) 5 MG tablet             (Z91.09) Environmental allergies  Comment: advised patient take the loratadine and flonase daily   Plan:  as above     (J34.89) Sinus pressure  Comment: patient had two rounds of antibiotics, not improved   Plan: monitor    Of note patient had head scan last year, normal     (K31.9) Gastropathy  Comment: reviewed egd report and pathology.  No evidence of cancer  Plan: could use famotidine prn         Subjective   Hemalatha is a 85 year old, presenting for the following health issues:  Sinus Problem        7/22/2025    11:33 AM   Additional Questions   Roomed by Stephanie Mcfarland     Sinus Problem     History of Present Illness       Reason for visit:  Stuffiness    She eats 0-1 servings of fruits and vegetables daily.She consumes 0 sweetened beverage(s) daily.She exercises with enough effort to increase her heart rate 9 or less minutes per day.  She exercises with enough effort to increase her heart rate 3 or less days per week.   She is taking medications regularly.       Stuffy in head    Antibiotics did not help the sinus    Loratadine not helping    Flonase occasionally   Breathing through nose okay    Off celexa since March    Feeling  sad , crying            Objective    BP (!) 179/91 (BP Location: Left arm, Patient Position: Chair, Cuff Size: Adult Regular)   Pulse 86   Temp 97.8  F (36.6  C) (Temporal)   Resp 14   Ht 1.549 m (5' 1\")   Wt 65.4 kg (144 lb 2 oz)   LMP  (LMP Unknown)   SpO2 96%   BMI 27.23 kg/m    Body mass index is 27.23 " kg/m .  Physical Exam  Constitutional:       Appearance: She is well-developed.   HENT:      Head: Normocephalic and atraumatic.      Right Ear: Tympanic membrane, ear canal and external ear normal.      Left Ear: Tympanic membrane, ear canal and external ear normal.      Nose: Nose normal.      Mouth/Throat:      Mouth: Mucous membranes are moist.      Pharynx: Oropharynx is clear.   Eyes:      Conjunctiva/sclera: Conjunctivae normal.   Neck:      Vascular: No carotid bruit.   Cardiovascular:      Rate and Rhythm: Normal rate and regular rhythm.      Heart sounds: Normal heart sounds.   Pulmonary:      Effort: Pulmonary effort is normal. No respiratory distress.      Breath sounds: Normal breath sounds.   Neurological:      Mental Status: She is alert and oriented to person, place, and time.         No sinus/ submandib tenderness    Radials symmetric     No edema                Signed Electronically by: Javier Jones MD

## 2025-07-23 DIAGNOSIS — I10 HYPERTENSION GOAL BP (BLOOD PRESSURE) < 140/90: ICD-10-CM

## 2025-07-23 RX ORDER — AMLODIPINE BESYLATE 5 MG/1
5 TABLET ORAL DAILY
Qty: 90 TABLET | Refills: 1 | OUTPATIENT
Start: 2025-07-23

## 2025-07-23 NOTE — TELEPHONE ENCOUNTER
Amlodipine 5 mg        Last written prescription date: 07/22/2025        Last fill quantity: 30, # refills: 1        Last office visit: 07/23/2025        Future office visit: -        Is this a controlled substance?  No       Routing refill request to provider for review/approval because: -     Pharmacy contacted the clinic would like the RX to be written for #90 and not # 30  Pended new RX with qty # 90 please advise   1st Trimester Sonogram/20 Week Level II Sonogram/BioPhysical Profile(s)/Fetal Non-Stress Test (NST)

## 2025-08-13 DIAGNOSIS — F41.9 ANXIETY: ICD-10-CM

## 2025-08-14 RX ORDER — ALPRAZOLAM 0.25 MG
0.25 TABLET ORAL 3 TIMES DAILY PRN
Qty: 30 TABLET | Refills: 0 | Status: SHIPPED | OUTPATIENT
Start: 2025-08-14

## (undated) DEVICE — PREP CHLORAPREP 26ML TINTED ORANGE  260815

## (undated) DEVICE — GLOVE PROTEXIS BLUE W/NEU-THERA 6.0  2D73EB60

## (undated) DEVICE — SUCTION MANIFOLD NEPTUNE 2 SYS 1 PORT 702-025-000

## (undated) DEVICE — SOL WATER IRRIG 500ML BOTTLE 2F7113

## (undated) DEVICE — DRSG TELFA 3X8" 1238

## (undated) DEVICE — LINEN TOWEL PACK X5 5464

## (undated) DEVICE — SOL NACL 0.9% IRRIG 500ML BOTTLE 2F7123

## (undated) DEVICE — PREP SCRUB CARE CHLOROXYLENOL (PCMX) 4OZ 29902-004

## (undated) DEVICE — PAD CHUX UNDERPAD 23X24" 7136

## (undated) DEVICE — GLOVE PROTEXIS W/NEU-THERA 6.0  2D73TE60

## (undated) DEVICE — ESU PENCIL W/SMOKE EVAC ROCKER E2350HS

## (undated) DEVICE — DRSG GAUZE 4X4" TRAY 6939

## (undated) DEVICE — KIT ENDO FIRST STEP DISINFECTANT 200ML W/POUCH EP-4

## (undated) DEVICE — PACK VAG HYST

## (undated) RX ORDER — LIDOCAINE HYDROCHLORIDE AND EPINEPHRINE 10; 10 MG/ML; UG/ML
INJECTION, SOLUTION INFILTRATION; PERINEURAL
Status: DISPENSED
Start: 2023-01-11

## (undated) RX ORDER — ACETIC ACID 5 %
LIQUID (ML) MISCELLANEOUS
Status: DISPENSED
Start: 2023-01-11

## (undated) RX ORDER — FERRIC SUBSULFATE 0.21 G/G
LIQUID TOPICAL
Status: DISPENSED
Start: 2023-01-11

## (undated) RX ORDER — FENTANYL CITRATE 50 UG/ML
INJECTION, SOLUTION INTRAMUSCULAR; INTRAVENOUS
Status: DISPENSED
Start: 2023-01-11

## (undated) RX ORDER — ACETAMINOPHEN 325 MG/1
TABLET ORAL
Status: DISPENSED
Start: 2023-01-11

## (undated) RX ORDER — CEFAZOLIN SODIUM 1 G/3ML
INJECTION, POWDER, FOR SOLUTION INTRAMUSCULAR; INTRAVENOUS
Status: DISPENSED
Start: 2023-01-11

## (undated) RX ORDER — FENTANYL CITRATE-0.9 % NACL/PF 10 MCG/ML
PLASTIC BAG, INJECTION (ML) INTRAVENOUS
Status: DISPENSED
Start: 2023-01-11

## (undated) RX ORDER — EPHEDRINE SULFATE 50 MG/ML
INJECTION, SOLUTION INTRAMUSCULAR; INTRAVENOUS; SUBCUTANEOUS
Status: DISPENSED
Start: 2023-01-11

## (undated) RX ORDER — PROPOFOL 10 MG/ML
INJECTION, EMULSION INTRAVENOUS
Status: DISPENSED
Start: 2023-01-11

## (undated) RX ORDER — BUPIVACAINE HYDROCHLORIDE 5 MG/ML
INJECTION, SOLUTION EPIDURAL; INTRACAUDAL
Status: DISPENSED
Start: 2023-01-11

## (undated) RX ORDER — ONDANSETRON 2 MG/ML
INJECTION INTRAMUSCULAR; INTRAVENOUS
Status: DISPENSED
Start: 2023-01-11